# Patient Record
Sex: MALE | Race: WHITE
[De-identification: names, ages, dates, MRNs, and addresses within clinical notes are randomized per-mention and may not be internally consistent; named-entity substitution may affect disease eponyms.]

---

## 2017-03-27 ENCOUNTER — RX ONLY (OUTPATIENT)
Age: 72
Setting detail: RX ONLY
End: 2017-03-27

## 2017-03-27 PROBLEM — D04.39 CARCINOMA IN SITU OF SKIN OF OTHER PARTS OF FACE: Status: ACTIVE | Noted: 2017-03-27

## 2017-03-28 ENCOUNTER — OFFICE VISIT (OUTPATIENT)
Dept: MEDICAL GROUP | Facility: MEDICAL CENTER | Age: 72
End: 2017-03-28
Payer: MEDICARE

## 2017-03-28 VITALS
OXYGEN SATURATION: 92 % | WEIGHT: 272 LBS | BODY MASS INDEX: 38.94 KG/M2 | HEART RATE: 89 BPM | HEIGHT: 70 IN | DIASTOLIC BLOOD PRESSURE: 84 MMHG | SYSTOLIC BLOOD PRESSURE: 140 MMHG | TEMPERATURE: 98.4 F

## 2017-03-28 DIAGNOSIS — Z12.5 PROSTATE CANCER SCREENING: ICD-10-CM

## 2017-03-28 DIAGNOSIS — E78.5 DYSLIPIDEMIA: Chronic | ICD-10-CM

## 2017-03-28 DIAGNOSIS — I10 ESSENTIAL HYPERTENSION: Chronic | ICD-10-CM

## 2017-03-28 DIAGNOSIS — M79.7 FIBROMYALGIA: Chronic | ICD-10-CM

## 2017-03-28 DIAGNOSIS — I48.0 PAROXYSMAL ATRIAL FIBRILLATION (HCC): Chronic | ICD-10-CM

## 2017-03-28 DIAGNOSIS — E55.9 HYPOVITAMINOSIS D: ICD-10-CM

## 2017-03-28 DIAGNOSIS — Z12.11 COLON CANCER SCREENING: ICD-10-CM

## 2017-03-28 DIAGNOSIS — F33.41 RECURRENT MAJOR DEPRESSIVE DISORDER, IN PARTIAL REMISSION (HCC): Chronic | ICD-10-CM

## 2017-03-28 DIAGNOSIS — E66.9 OBESITY, UNSPECIFIED OBESITY SEVERITY, UNSPECIFIED OBESITY TYPE: ICD-10-CM

## 2017-03-28 PROCEDURE — 99214 OFFICE O/P EST MOD 30 MIN: CPT | Performed by: INTERNAL MEDICINE

## 2017-03-28 NOTE — MR AVS SNAPSHOT
"        Julio Gonzalez   3/28/2017 10:00 AM   Office Visit   MRN: [id number]    Department:  [facility]   Dept Phone:  [phone]    Description:  Male : 1945   Provider:  Norman Peterson M.D.           Allergies as of 3/28/2017     Allergen Noted Reactions    Dye Fdc Blue [Brilliant Blue Fcf] 2009       Angiogram contrast sensitivity, became very irritable      You were diagnosed with     Colon cancer screening   [324894]       Paroxysmal atrial fibrillation (CMS-HCC)   [990739]       Essential hypertension   [4176472]       Dyslipidemia   [299651]       Recurrent major depressive disorder, in partial remission (CMS-HCC)   [5903880]       Fibromyalgia   [624747]       Prostate cancer screening   [629880]       Hypovitaminosis D   [323033]         Vital Signs     Blood Pressure Pulse Temperature Height Weight Body Mass Index    140/84 mmHg 89 36.9 °C (98.4 °F) 1.778 m (5' 10\") 123.378 kg (272 lb) 39.03 kg/m2    Oxygen Saturation Smoking Status                92% Former Smoker          Basic Information     Date Of Birth Sex Race Ethnicity Preferred Language    1945 Male White Non- English      Your appointments     2017 11:20 AM   FOLLOW UP with Irineo Skinner M.D.   [facility] (--)    [address], [address]  [place] [id number] [phone]            Sep 28, 2017 10:00 AM   Established Patient with Norman Peterson M.D.   [facility])    [address]  [facility] [address] [phone]           You will be receiving a confirmation call a few days before your appointment from our automated call confirmation system.              Problem List              ICD-10-CM Priority Class Noted - Resolved    S/P hip replacement Z96.649   2009 - Present    Status post lumbar surgery (Chronic) Z98.890   2009 - Present    Cervical pain M54.2   2009 - Present   " Hypertension (Chronic) I10   9/16/2009 - Present    Paroxysmal atrial fibrillation (CMS-HCC) (Chronic) I48.0   9/16/2009 - Present    Dyslipidemia (Chronic) E78.5   9/16/2009 - Present    Skin lesion L98.9   9/16/2009 - Present    Preventative health care (Chronic) Z00.00   9/16/2009 - Present    S/P knee replacement Z96.659   9/16/2009 - Present    Obesity E66.9   9/17/2009 - Present    Plantar fasciitis M72.2   11/2/2010 - Present    History of stroke (Chronic) Z86.73   8/5/2011 - Present    MEDICAL HOME    8/29/2012 - Present    Insomnia (Chronic) G47.00   2/8/2013 - Present    Depression, major, 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   12/6/2013 - Present    Chronic pain G89.29   12/6/2013 - Present    Arthritis of knee, right (Chronic) M19.90   9/3/2014 - Present    Speech and language deficit, late effect of cerebrovascular disease I69.928 Low  5/20/2015 - Present    Fibromyalgia (Chronic) M79.7   3/26/2016 - Present      Health Maintenance        Date Due Completion Dates    IMM DTaP/Tdap/Td Vaccine (1 - Tdap) 8/6/2011 8/5/2011    IMM PNEUMOCOCCAL 65+ (ADULT) LOW/MEDIUM RISK SERIES (2 of 2 - PPSV23) 8/27/2016 8/27/2015, 9/17/2009    COLONOSCOPY 4/6/2017 4/6/2007 (Done), 4/6/2007    Override on 4/6/2007: Done            Current Immunizations     13-VALENT PCV PREVNAR 8/27/2015    Influenza TIV (IM) 9/26/2012  4:50 PM    Influenza Vaccine Adult HD 10/11/2016, 9/30/2014    Pneumococcal polysaccharide vaccine (PPSV-23) 9/17/2009    SHINGLES VACCINE 10/26/2010    TD Vaccine 8/5/2011      Below and/or attached are the medications your provider expects you to take. Review all of your home medications and newly ordered medications with your provider and/or pharmacist. Follow medication instructions as directed by your provider and/or pharmacist. Please keep your medication list with you and share with your provider. Update the information when medications are discontinued, doses are changed, or new medications  (including over-the-counter products) are added; and carry medication information at all times in the event of emergency situations     Allergies:  DYE FDC BLUE - (reactions not documented)               Medications  Valid as of: March 28, 2017 - 10:26 AM    Generic Name Brand Name Tablet Size Instructions for use    AmLODIPine Besylate (Tab) NORVASC 10 MG TAKE ONE TABLET BY MOUTH DAILY        Aspirin (Chew Tab) ASA 81 MG Take 1 Tab by mouth every day.        Cholecalciferol (Tab) cholecalciferol 1000 UNIT Take 6,000 Units by mouth every day.        Desonide (Cream) TRIDESILON 0.05 % Apply 1 Application to affected area(s) 1 time daily as needed (rash).        DiphenhydrAMINE HCl (Tab) BENADRYL 25 MG Take 25 mg by mouth every 6 hours as needed for Sleep.        Docusate Sodium (Cap) COLACE 100 MG Take 100 mg by mouth every day.        DULoxetine HCl (Cap DR Particles) CYMBALTA 60 MG Take 1 Cap by mouth every day.        Ibuprofen (Tab) MOTRIN 400 MG Take 400 mg by mouth every 6 hours as needed.        Losartan Potassium (Tab) COZAAR 100 MG TAKE ONE TABLET BY MOUTH DAILY        Multiple Vitamins-Minerals   Take  by mouth.        Naproxen (Tab) NAPROSYN 500 MG Take 1 Tab by mouth 2 times a day as needed.        Oxycodone-Acetaminophen (Tab) PERCOCET-10  MG Take 1 Tab by mouth every four hours as needed for Moderate Pain. Ok to refill percocet 28-30 days after the percocet written on 5/24/16 is filled        Polyethylene Glycol 3350 (Pack) MIRALAX  Take 17 g by mouth every day.        Potassium Chloride Lise CR (Tab CR) Kdur 20 MEQ Take 2 Tabs by mouth 2 times a day.        Simvastatin (Tab) ZOCOR 20 MG TAKE ONE TABLET BY MOUTH EVERY EVENING        Triamterene-HCTZ (Cap) MAXZIDE-25/DYAZIDE 37.5-25 MG TAKE ONE CAPSULE BY MOUTH DAILY        Zolpidem Tartrate (Tab) AMBIEN 10 MG Take 1 Tab by mouth at bedtime as needed for Sleep.        .                 Medicines prescribed today were sent to:     [id number] -  [place], [place] - [address] [facility] [place]    [address] [facility] [place] [address]    Phone: [phone] Fax: [phone]    Open 24 Hours?: No    [facility] - [place], [place] - [address]    [address] [place] [address]    Phone: [phone] Fax: [phone]    Open 24 Hours?: No      Medication refill instructions:       If your prescription bottle indicates you have medication refills left, it is not necessary to call your provider’s office. Please contact your pharmacy and they will refill your medication.    If your prescription bottle indicates you do not have any refills left, you may request refills at any time through one of the following ways: The online [facility] system (except Urgent Care), by calling your provider’s office, or by asking your pharmacy to contact your provider’s office with a refill request. Medication refills are processed only during regular business hours and may not be available until the next business day. Your provider may request additional information or to have a follow-up visit with you prior to refilling your medication.   *Please Note: Medication refills are assigned a new Rx number when refilled electronically. Your pharmacy may indicate that no refills were authorized even though a new prescription for the same medication is available at the pharmacy. Please request the medicine by name with the pharmacy before contacting your provider for a refill.        Your To Do List     Future Labs/Procedures Complete By Expires    CBC WITH DIFFERENTIAL  As directed 3/29/2018    CBC WITH DIFFERENTIAL  As directed 3/29/2018    COMP METABOLIC PANEL  As directed 3/29/2018    COMP METABOLIC PANEL  As directed 3/29/2018    LIPID PROFILE  As directed 3/29/2018    LIPID PROFILE  As directed 3/29/2018    PROSTATE SPECIFIC AG SCREENING  As directed 3/29/2018    PROSTATE SPECIFIC AG SCREENING  As directed 3/29/2018    TSH  As directed 3/29/2018    TSH  As directed 3/29/2018     VITAMIN D,25 HYDROXY  As directed 3/29/2018    VITAMIN D,25 HYDROXY  As directed 3/29/2018      Referral     A referral request has been sent to our patient care coordination department. Please allow 3-5 business days for us to process this request and contact you either by phone or mail. If you do not hear from us by the 5th business day, please call us at [phone].        Other Notes About Your Plan     Need all labs                     MyChart Access Code: Activation code not generated  Current MyChart Status: Active

## 2017-03-28 NOTE — PROGRESS NOTES
Subjective:      Julio Gonzalez is a 71 y.o. male who presents with htn        HPI     Hypertension on losartan and maxzide blood pressure runs 130-140/80 has wrist cuff at home, not exercising regularly, no soda, sees cardiology next week, no palpitations or chest pain, no extra caffeine, on oxycone per  no change in meds, on cymbalta stable mood, on ambien for sleep no side effects, tries to eat healthy wheat cereal breakfast and salad and sandwich for lunch and dinner salad, tries to avoid sweets and candies, no etoh  Remains stable on hydrocodone for pain management 10 mg 5-6 per day, no drowsiness, sedation, memory loss, confusion, worsening depression, mood changes, good social support with wife, no alcohol, no illicit drugs  Due for colon [facility] last 10 years ago  Has had annual eye exam and uses reading glasses  Teeth cleaning twice per year  Remains on losartan, Maxzide, Norvasc no lightheadedness or dizziness  Dyslipidemia on Zocor no muscle pain or muscle aches  Fibromyalgia and taking Cymbalta no mood changes, anxiety, depression      Current Outpatient Prescriptions   Medication Sig Dispense Refill   • triamterene/hctz (MAXZIDE-25/DYAZIDE) 37.5-25 MG Cap TAKE ONE CAPSULE BY MOUTH DAILY 90 Cap 0   • losartan (COZAAR) 100 MG Tab TAKE ONE TABLET BY MOUTH DAILY 90 Tab 0   • amlodipine (NORVASC) 10 MG Tab TAKE ONE TABLET BY MOUTH DAILY 90 Tab 0   • simvastatin (ZOCOR) 20 MG Tab TAKE ONE TABLET BY MOUTH EVERY EVENING 90 Tab 0   • potassium chloride SA (KLOR-CON M20) 20 MEQ Tab CR Take 2 Tabs by mouth 2 times a day. 360 Tab 3   • zolpidem (AMBIEN) 10 MG Tab Take 1 Tab by mouth at bedtime as needed for Sleep. 90 Each 1   • duloxetine (CYMBALTA) 60 MG Cap DR Particles delayed-release capsule Take 1 Cap by mouth every day. 90 Cap 3   • Multiple Vitamins-Minerals (MULTI COMPLETE PO) Take  by mouth.     • diphenhydrAMINE (BENADRYL) 25 MG Tab Take 25 mg by mouth every 6 hours as needed for Sleep.     •  "ibuprofen (MOTRIN) 400 MG Tab Take 400 mg by mouth every 6 hours as needed.     • Oxycodone-Acetaminophen (PERCOCET-10)  MG Tab Take 1 Tab by mouth every four hours as needed for Moderate Pain. Ok to refill percocet 28-30 days after the percocet written on 5/24/16 is filled 180 Tab 0   • desonide (TRIDESILON) 0.05 % Cream Apply 1 Application to affected area(s) 1 time daily as needed (rash). 45 g 3   • naproxen (NAPROSYN) 500 MG Tab Take 1 Tab by mouth 2 times a day as needed. 60 Tab 5   • aspirin (ASA) 81 MG Chew Tab chewable tablet Take 1 Tab by mouth every day. 100 Tab 11   • polyethylene glycol/lytes (MIRALAX) PACK Take 17 g by mouth every day.     • docusate sodium (COLACE) 100 MG CAPS Take 100 mg by mouth every day.     • vitamin D (CHOLECALCIFEROL) 1000 UNIT TABS Take 6,000 Units by mouth every day.       No current facility-administered medications for this visit.     Patient Active Problem List    Diagnosis Date Noted   • Speech and language deficit, late effect of cerebrovascular disease 05/20/2015     Priority: Low   • Fibromyalgia 03/26/2016   • Arthritis of knee, right 09/03/2014   • Depression, major, in partial remission (HCC) 12/06/2013   • Chronic pain 12/06/2013   • Insomnia 02/08/2013   • MEDICAL HOME 08/29/2012   • History of stroke 08/05/2011   • Plantar fasciitis 11/02/2010   • Obesity 09/17/2009   • S/P hip replacement 09/16/2009   • Status post lumbar surgery 09/16/2009   • Cervical pain 09/16/2009   • Hypertension 09/16/2009   • Paroxysmal atrial fibrillation (CMS-HCC) 09/16/2009   • Dyslipidemia 09/16/2009   • Skin lesion 09/16/2009   • Preventative health care 09/16/2009   • S/P knee replacement 09/16/2009           ROS       Objective:     /84 mmHg  Pulse 89  Temp(Src) 36.9 °C (98.4 °F)  Ht 1.778 m (5' 10\")  Wt 123.378 kg (272 lb)  BMI 39.03 kg/m2  SpO2 92%     Physical Exam   Constitutional: He appears well-developed and well-nourished. No distress.   HENT:   Head: " Normocephalic and atraumatic.   Eyes: Conjunctivae are normal. Right eye exhibits no discharge. Left eye exhibits no discharge.   Neck: No JVD present. No thyromegaly present.   Cardiovascular: Normal rate, regular rhythm and normal heart sounds.    Pulmonary/Chest: Effort normal and breath sounds normal. No respiratory distress. He has no wheezes.   Abdominal: Soft. He exhibits no distension.   Musculoskeletal: He exhibits no edema.   Neurological: He is alert.   Skin: He is not diaphoretic. No erythema.   Psychiatric: He has a normal mood and affect. His behavior is normal.   Nursing note and vitals reviewed.              Assessment/Plan:     Assessment  #1 hypertension currently stable on Maxide, losartan, Norvasc no lightheadedness or dizziness    #2 paroxysmal atrial fibrillation on aspirin per cardiology status post Maze procedure, no chest pain, no palpitations, no lightheadedness    #3 chronic pain on Percocet per Dr. Mathias from pain management, dose has maintained the same, no side effects such as drowsiness, sedation, memory loss, falls, depression, constipation, no alcohol or illicit drugs    #4 obesity BMI 39    #5 insomnia on Ambien without daytime drowsiness or hangover effect    #6 dyslipidemia on Zocor no muscle pain or muscle aches on statin therapy    #7 fibromyalgia on Cymbalta    #8 history of low vitamin D       Plan  #! [facility] referral follow-up colonoscopy colon cancer screening    #2 labs    #3 continue check blood pressure regularly and record    #4 start exercising 30 minutes daily, importance of diet, exercise as tolerated given chronic pain, discussed importance of weight loss with regards to cardiovascular health, diabetes prevention discussed, declines nutrition consultation    #5 continue follow-up pain management    #6 sleep hygiene discussed    #7 follow-up 6 months    #8 continue no tobacco, no alcohol    #9 opiate risk zero

## 2017-04-04 ENCOUNTER — OFFICE VISIT (OUTPATIENT)
Dept: CARDIOLOGY | Facility: MEDICAL CENTER | Age: 72
End: 2017-04-04
Payer: MEDICARE

## 2017-04-04 VITALS
HEIGHT: 70 IN | BODY MASS INDEX: 39.65 KG/M2 | SYSTOLIC BLOOD PRESSURE: 154 MMHG | WEIGHT: 277 LBS | DIASTOLIC BLOOD PRESSURE: 90 MMHG | OXYGEN SATURATION: 92 % | HEART RATE: 60 BPM

## 2017-04-04 DIAGNOSIS — I10 ESSENTIAL HYPERTENSION: ICD-10-CM

## 2017-04-04 DIAGNOSIS — I48.0 PAF (PAROXYSMAL ATRIAL FIBRILLATION) (HCC): ICD-10-CM

## 2017-04-04 DIAGNOSIS — M17.11 ARTHRITIS OF KNEE, RIGHT: Chronic | ICD-10-CM

## 2017-04-04 DIAGNOSIS — E66.01 MORBID OBESITY DUE TO EXCESS CALORIES (HCC): ICD-10-CM

## 2017-04-04 DIAGNOSIS — I69.928 SPEECH AND LANGUAGE DEFICIT, LATE EFFECT OF CEREBROVASCULAR DISEASE: ICD-10-CM

## 2017-04-04 DIAGNOSIS — I48.0 PAROXYSMAL ATRIAL FIBRILLATION (HCC): Chronic | ICD-10-CM

## 2017-04-04 LAB — EKG IMPRESSION: NORMAL

## 2017-04-04 PROCEDURE — 1101F PT FALLS ASSESS-DOCD LE1/YR: CPT | Performed by: INTERNAL MEDICINE

## 2017-04-04 PROCEDURE — 99214 OFFICE O/P EST MOD 30 MIN: CPT | Performed by: INTERNAL MEDICINE

## 2017-04-04 PROCEDURE — G8432 DEP SCR NOT DOC, RNG: HCPCS | Performed by: INTERNAL MEDICINE

## 2017-04-04 PROCEDURE — 4040F PNEUMOC VAC/ADMIN/RCVD: CPT | Performed by: INTERNAL MEDICINE

## 2017-04-04 PROCEDURE — 93000 ELECTROCARDIOGRAM COMPLETE: CPT | Performed by: INTERNAL MEDICINE

## 2017-04-04 PROCEDURE — G8419 CALC BMI OUT NRM PARAM NOF/U: HCPCS | Performed by: INTERNAL MEDICINE

## 2017-04-04 PROCEDURE — 1036F TOBACCO NON-USER: CPT | Performed by: INTERNAL MEDICINE

## 2017-04-04 NOTE — PROGRESS NOTES
Subjective:   Julio Gonzalez is a 71 y.o. male who presents today with previous a fib post Maze. CVA in past. Feeling well. Weight still a problem not interested in bariatric referral. Htn moderate control.  No chest pain or SOB. No syncope or presyncope.  Past Medical History   Diagnosis Date   • CAD (coronary artery disease)    • Arthritis    • Pain      right leg & foot, left foot, lower back   • Hypertension    • Hypercholesteremia    • Aphasia    • Hypercholesterolemia 2/13/2012   • MEDICAL HOME    • Arrhythmia      a-fib, ablation x 2   • Stroke (CMS-HCC) 1999     aphasia , right sided weakness   • Unspecified cerebral artery occlusion without mention of cerebral infarction 2/13/2012     Past Surgical History   Procedure Laterality Date   • Hip replacement, total  2002/2004     right/left   • Other cardiac surgery  2000     surgical maze   • Other abdominal surgery  1981     stomach stapling   • Knee replacement, total  2001     left   • Vein ligation  1987     left   • Other  1994     bone spur removed right heel   • Other  1999     cardiac ablation x 2   • Block epidural steroid injection  2001     x 2 back   • Colonoscopy  2001/2007   • Angiogram  1999     cardiac   • Eswt  12/9/2009     Performed by DEEPAK ALLEN at [facility] ORS   • Pr inj dx/ther agnt paravert facet joint, jessica*  11/4/2011     Performed by HAMLET GASCA at [facility] ORS   • Pr inj dx/ther agnt paravert facet joint, ce*  11/4/2011     Performed by HAMLET GASCA at [facility] ORS   • Maze procedure       2000   • Lumbar fusion posterior  9/26/2012     Performed by Chris Beltran M.D. at [facility] ORS   • Lumbar decompression  9/26/2012     Performed by Chris Beltran M.D. at SUR[facility]ERY [facility] ORS   • Maze procedure     • Knee replacement, total  2002     Family History   Problem Relation Age of Onset   • Heart Disease     • Hypertension     • Stroke     • Heart Disease  Brother      atrial fib   • Cancer Sister      breast cancer     History   Smoking status   • Former Smoker   • Quit date: 09/07/1967   Smokeless tobacco   • Never Used     Allergies   Allergen Reactions   • Dye Fdc Blue [Brilliant Blue Fcf]      Angiogram contrast sensitivity, became very irritable     Outpatient Encounter Prescriptions as of 4/4/2017   Medication Sig Dispense Refill   • triamterene/hctz (MAXZIDE-25/DYAZIDE) 37.5-25 MG Cap TAKE ONE CAPSULE BY MOUTH DAILY 90 Cap 0   • losartan (COZAAR) 100 MG Tab TAKE ONE TABLET BY MOUTH DAILY 90 Tab 0   • amlodipine (NORVASC) 10 MG Tab TAKE ONE TABLET BY MOUTH DAILY 90 Tab 0   • simvastatin (ZOCOR) 20 MG Tab TAKE ONE TABLET BY MOUTH EVERY EVENING 90 Tab 0   • potassium chloride SA (KLOR-CON M20) 20 MEQ Tab CR Take 2 Tabs by mouth 2 times a day. 360 Tab 3   • zolpidem (AMBIEN) 10 MG Tab Take 1 Tab by mouth at bedtime as needed for Sleep. 90 Each 1   • duloxetine (CYMBALTA) 60 MG Cap DR Particles delayed-release capsule Take 1 Cap by mouth every day. 90 Cap 3   • Multiple Vitamins-Minerals (MULTI COMPLETE PO) Take  by mouth.     • diphenhydrAMINE (BENADRYL) 25 MG Tab Take 25 mg by mouth every 6 hours as needed for Sleep.     • Oxycodone-Acetaminophen (PERCOCET-10)  MG Tab Take 1 Tab by mouth every four hours as needed for Moderate Pain. Ok to refill percocet 28-30 days after the percocet written on 5/24/16 is filled 180 Tab 0   • desonide (TRIDESILON) 0.05 % Cream Apply 1 Application to affected area(s) 1 time daily as needed (rash). 45 g 3   • naproxen (NAPROSYN) 500 MG Tab Take 1 Tab by mouth 2 times a day as needed. 60 Tab 5   • aspirin (ASA) 81 MG Chew Tab chewable tablet Take 1 Tab by mouth every day. 100 Tab 11   • polyethylene glycol/lytes (MIRALAX) PACK Take 17 g by mouth every day.     • docusate sodium (COLACE) 100 MG CAPS Take 100 mg by mouth every day.     • vitamin D (CHOLECALCIFEROL) 1000 UNIT TABS Take 6,000 Units by mouth every day.     •  "ibuprofen (MOTRIN) 400 MG Tab Take 400 mg by mouth every 6 hours as needed.       No facility-administered encounter medications on file as of 4/4/2017.     ROS     Objective:   /90 mmHg  Pulse 60  Ht 1.778 m (5' 10\")  Wt 125.646 kg (277 lb)  BMI 39.75 kg/m2  SpO2 92%    Physical Exam   Constitutional: He is oriented to person, place, and time. He appears well-developed and well-nourished.   HENT:   Mouth/Throat: Oropharynx is clear and moist.   Eyes: Conjunctivae and EOM are normal.   Neck: No JVD present. No thyroid mass present.   Cardiovascular: Normal rate, regular rhythm, S1 normal, S2 normal and normal pulses.  PMI is not displaced.  Exam reveals no gallop.    No murmur heard.  Pulses:       Carotid pulses are 2+ on the right side, and 2+ on the left side.       Radial pulses are 2+ on the right side, and 2+ on the left side.        Femoral pulses are 2+ on the right side, and 2+ on the left side.       Dorsalis pedis pulses are 2+ on the right side, and 2+ on the left side.   No peripheral edema.   Pulmonary/Chest: Effort normal and breath sounds normal.   Abdominal: Soft. Normal appearance. He exhibits no abdominal bruit. There is no hepatosplenomegaly. There is no tenderness.   Musculoskeletal: Normal range of motion. He exhibits no edema.        Thoracic back: He exhibits no tenderness and no spasm.   Neurological: He is alert and oriented to person, place, and time.   Skin: No rash noted. No cyanosis. Nails show no clubbing.   Psychiatric: He has a normal mood and affect.       Assessment:     1. PAF (paroxysmal atrial fibrillation) (CMS-[facility])  EKG   2. Essential hypertension  EKG   3. Paroxysmal atrial fibrillation (CMS-HCC)     4. Speech and language deficit, late effect of cerebrovascular disease     5. Morbid obesity due to excess calories (CMS-[facility])     6. Arthritis of knee, right         Medical Decision Making:  Today's Assessment / Status / Plan:     1. A fib no obvious recurrence.  2. " Htn continue meds.  3. HLD on statins.  4. Obesity work on weight loss.  5. F/U in 1 year.

## 2017-04-04 NOTE — MR AVS SNAPSHOT
"        Julio Gonzalez   2017 11:20 AM   Office Visit   MRN: [id number]    Department:  [facility]   Dept Phone:  [phone]    Description:  Male : 1945   Provider:  Irineo Skinner M.D.           Reason for Visit     Follow-Up           Allergies as of 2017     Allergen Noted Reactions    Dye Fdc Blue [Brilliant Blue Fcf] 2009       Angiogram contrast sensitivity, became very irritable      You were diagnosed with     PAF (paroxysmal atrial fibrillation) (CMS-HCC)   [063863]       Essential hypertension   [2295527]         Vital Signs     Blood Pressure Pulse Height Weight Body Mass Index Oxygen Saturation    154/90 mmHg 60 1.778 m (5' 10\") 125.646 kg (277 lb) 39.75 kg/m2 92%    Smoking Status                   Former Smoker           Basic Information     Date Of Birth Sex Race Ethnicity Preferred Language    1945 Male White Non- English      Your appointments     Sep 28, 2017 10:00 AM   Established Patient with Norman Peterson M.D.   [facility])    [address]  [facility] [address] [phone]           You will be receiving a confirmation call a few days before your appointment from our automated call confirmation system.              Problem List              ICD-10-CM Priority Class Noted - Resolved    S/P hip replacement Z96.649   2009 - Present    Status post lumbar surgery (Chronic) Z98.890   2009 - Present    Cervical pain M54.2   2009 - Present    Hypertension (Chronic) I10   2009 - Present    Paroxysmal atrial fibrillation (CMS-HCC) (Chronic) I48.0   2009 - Present    Dyslipidemia (Chronic) E78.5   2009 - Present    Skin lesion L98.9   2009 - Present    Preventative health care (Chronic) Z00.00   2009 - Present    S/P knee replacement Z96.659   2009 - Present    Obesity E66.9   2009 - Present    Plantar fasciitis M72.2   2010 - Present    History " of stroke (Chronic) Z86.73   8/5/2011 - Present    MEDICAL HOME    8/29/2012 - Present    Insomnia (Chronic) G47.00   2/8/2013 - Present    Depression, major, 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   12/6/2013 - Present    Chronic pain G89.29   12/6/2013 - Present    Arthritis of knee, right (Chronic) M19.90   9/3/2014 - Present    Speech and language deficit, late effect of cerebrovascular disease I69.928 Low  5/20/2015 - Present    Fibromyalgia (Chronic) M79.7   3/26/2016 - Present      Health Maintenance        Date Due Completion Dates    IMM DTaP/Tdap/Td Vaccine (1 - Tdap) 8/6/2011 8/5/2011    IMM PNEUMOCOCCAL 65+ (ADULT) LOW/MEDIUM RISK SERIES (2 of 2 - PPSV23) 8/27/2016 8/27/2015, 9/17/2009    COLONOSCOPY 4/6/2017 4/6/2007 (Done), 4/6/2007    Override on 4/6/2007: Done            Results       Current Immunizations     13-VALENT PCV PREVNAR 8/27/2015    Influenza TIV (IM) 9/26/2012  4:50 PM    Influenza Vaccine Adult HD 10/11/2016, 9/30/2014    Pneumococcal polysaccharide vaccine (PPSV-23) 9/17/2009    SHINGLES VACCINE 10/26/2010    TD Vaccine 8/5/2011      Below and/or attached are the medications your provider expects you to take. Review all of your home medications and newly ordered medications with your provider and/or pharmacist. Follow medication instructions as directed by your provider and/or pharmacist. Please keep your medication list with you and share with your provider. Update the information when medications are discontinued, doses are changed, or new medications (including over-the-counter products) are added; and carry medication information at all times in the event of emergency situations     Allergies:  DYE FDC BLUE - (reactions not documented)               Medications  Valid as of: April 04, 2017 - 11:41 AM    Generic Name Brand Name Tablet Size Instructions for use    AmLODIPine Besylate (Tab) NORVASC 10 MG TAKE ONE TABLET BY MOUTH DAILY        Aspirin (Chew Tab) ASA 81 MG Take 1 Tab  by mouth every day.        Cholecalciferol (Tab) cholecalciferol 1000 UNIT Take 6,000 Units by mouth every day.        Desonide (Cream) TRIDESILON 0.05 % Apply 1 Application to affected area(s) 1 time daily as needed (rash).        DiphenhydrAMINE HCl (Tab) BENADRYL 25 MG Take 25 mg by mouth every 6 hours as needed for Sleep.        Docusate Sodium (Cap) COLACE 100 MG Take 100 mg by mouth every day.        DULoxetine HCl (Cap DR Particles) CYMBALTA 60 MG Take 1 Cap by mouth every day.        Ibuprofen (Tab) MOTRIN 400 MG Take 400 mg by mouth every 6 hours as needed.        Losartan Potassium (Tab) COZAAR 100 MG TAKE ONE TABLET BY MOUTH DAILY        Multiple Vitamins-Minerals   Take  by mouth.        Naproxen (Tab) NAPROSYN 500 MG Take 1 Tab by mouth 2 times a day as needed.        Oxycodone-Acetaminophen (Tab) PERCOCET-10  MG Take 1 Tab by mouth every four hours as needed for Moderate Pain. Ok to refill percocet 28-30 days after the percocet written on 5/24/16 is filled        Polyethylene Glycol 3350 (Pack) MIRALAX  Take 17 g by mouth every day.        Potassium Chloride Lise CR (Tab CR) Kdur 20 MEQ Take 2 Tabs by mouth 2 times a day.        Simvastatin (Tab) ZOCOR 20 MG TAKE ONE TABLET BY MOUTH EVERY EVENING        Triamterene-HCTZ (Cap) MAXZIDE-25/DYAZIDE 37.5-25 MG TAKE ONE CAPSULE BY MOUTH DAILY        Zolpidem Tartrate (Tab) AMBIEN 10 MG Take 1 Tab by mouth at bedtime as needed for Sleep.        .                 Medicines prescribed today were sent to:     [id number] [place] - [address] [facility]    [address] [address] [facility] [place] [address]    Phone: [phone] Fax: [phone]    Open 24 Hours?: No    [facility] - [place], [place] - [address]    [address] [place] [address]    Phone: [phone] Fax: [phone]    Open 24 Hours?: No      Medication refill instructions:       If your prescription bottle indicates you have medication refills left, it is not necessary to call  your provider’s office. Please contact your pharmacy and they will refill your medication.    If your prescription bottle indicates you do not have any refills left, you may request refills at any time through one of the following ways: The online [employer] system (except Urgent Care), by calling your provider’s office, or by asking your pharmacy to contact your provider’s office with a refill request. Medication refills are processed only during regular business hours and may not be available until the next business day. Your provider may request additional information or to have a follow-up visit with you prior to refilling your medication.   *Please Note: Medication refills are assigned a new Rx number when refilled electronically. Your pharmacy may indicate that no refills were authorized even though a new prescription for the same medication is available at the pharmacy. Please request the medicine by name with the pharmacy before contacting your provider for a refill.        Other Notes About Your Plan     Need all labs                     [employer] Access Code: Activation code not generated  Current [employer] Status: Active

## 2017-04-04 NOTE — Clinical Note
[facility]   [address]  [place] [address]  Phone: [phone]  Fax: [phone]              Julio Gonzalez  1945    Encounter Date: 4/4/2017    Irineo Skinner M.D.          PROGRESS NOTE:  Subjective:   Julio Gonzalez is a 71 y.o. male who presents today with previous a fib post Maze. CVA in past. Feeling well. Weight still a problem not interested in bariatric referral. Htn moderate control.  No chest pain or SOB. No syncope or presyncope.  Past Medical History   Diagnosis Date   • CAD (coronary artery disease)    • Arthritis    • Pain      right leg & foot, left foot, lower back   • Hypertension    • Hypercholesteremia    • Aphasia    • Hypercholesterolemia 2/13/2012   • MEDICAL HOME    • Arrhythmia      a-fib, ablation x 2   • Stroke (CMS-[facility]) 1999     aphasia , right sided weakness   • Unspecified cerebral artery occlusion without mention of cerebral infarction 2/13/2012     Past Surgical History   Procedure Laterality Date   • Hip replacement, total  2002/2004     right/left   • Other cardiac surgery  2000     surgical maze   • Other abdominal surgery  1981     stomach stapling   • Knee replacement, total  2001     left   • Vein ligation  1987     left   • Other  1994     bone spur removed right heel   • Other  1999     cardiac ablation x 2   • Block epidural steroid injection  2001     x 2 back   • Colonoscopy  2001/2007   • Angiogram  1999     cardiac   • Eswt  12/9/2009     Performed by DEEPAK ALLEN at SURGERY [facility] ORS   • Pr inj dx/ther agnt paravert facet joint, jessica*  11/4/2011     Performed by HAMLET GASCA at SURGERY [facility] ORS   • Pr inj dx/ther agnt paravert facet joint, ce*  11/4/2011     Performed by HAMLET GASCA at [facility] ORS   • Maze procedure       2000   • Lumbar fusion posterior  9/26/2012     Performed by Chris Beltran M.D. at SURGERY [facility] ORS   • Lumbar  decompression  9/26/2012     Performed by Chris Beltran M.D. at SURGERY [facility] ORS   • Maze procedure     • Knee replacement, total  2002     Family History   Problem Relation Age of Onset   • Heart Disease     • Hypertension     • Stroke     • Heart Disease Brother      atrial fib   • Cancer Sister      breast cancer     History   Smoking status   • Former Smoker   • Quit date: 09/07/1967   Smokeless tobacco   • Never Used     Allergies   Allergen Reactions   • Dye Fdc Blue [Brilliant Blue Fcf]      Angiogram contrast sensitivity, became very irritable     Outpatient Encounter Prescriptions as of 4/4/2017   Medication Sig Dispense Refill   • triamterene/hctz (MAXZIDE-25/DYAZIDE) 37.5-25 MG Cap TAKE ONE CAPSULE BY MOUTH DAILY 90 Cap 0   • losartan (COZAAR) 100 MG Tab TAKE ONE TABLET BY MOUTH DAILY 90 Tab 0   • amlodipine (NORVASC) 10 MG Tab TAKE ONE TABLET BY MOUTH DAILY 90 Tab 0   • simvastatin (ZOCOR) 20 MG Tab TAKE ONE TABLET BY MOUTH EVERY EVENING 90 Tab 0   • potassium chloride SA (KLOR-CON M20) 20 MEQ Tab CR Take 2 Tabs by mouth 2 times a day. 360 Tab 3   • zolpidem (AMBIEN) 10 MG Tab Take 1 Tab by mouth at bedtime as needed for Sleep. 90 Each 1   • duloxetine (CYMBALTA) 60 MG Cap DR Particles delayed-release capsule Take 1 Cap by mouth every day. 90 Cap 3   • Multiple Vitamins-Minerals (MULTI COMPLETE PO) Take  by mouth.     • diphenhydrAMINE (BENADRYL) 25 MG Tab Take 25 mg by mouth every 6 hours as needed for Sleep.     • Oxycodone-Acetaminophen (PERCOCET-10)  MG Tab Take 1 Tab by mouth every four hours as needed for Moderate Pain. Ok to refill percocet 28-30 days after the percocet written on 5/24/16 is filled 180 Tab 0   • desonide (TRIDESILON) 0.05 % Cream Apply 1 Application to affected area(s) 1 time daily as needed (rash). 45 g 3   • naproxen (NAPROSYN) 500 MG Tab Take 1 Tab by mouth 2 times a day as needed. 60 Tab 5   • aspirin (ASA) 81 MG Chew Tab chewable tablet Take 1 Tab by mouth every  "day. 100 Tab 11   • polyethylene glycol/lytes (MIRALAX) PACK Take 17 g by mouth every day.     • docusate sodium (COLACE) 100 MG CAPS Take 100 mg by mouth every day.     • vitamin D (CHOLECALCIFEROL) 1000 UNIT TABS Take 6,000 Units by mouth every day.     • ibuprofen (MOTRIN) 400 MG Tab Take 400 mg by mouth every 6 hours as needed.       No facility-administered encounter medications on file as of 4/4/2017.     ROS     Objective:   /90 mmHg  Pulse 60  Ht 1.778 m (5' 10\")  Wt 125.646 kg (277 lb)  BMI 39.75 kg/m2  SpO2 92%    Physical Exam   Constitutional: He is oriented to person, place, and time. He appears well-developed and well-nourished.   HENT:   Mouth/Throat: Oropharynx is clear and moist.   Eyes: Conjunctivae and EOM are normal.   Neck: No JVD present. No thyroid mass present.   Cardiovascular: Normal rate, regular rhythm, S1 normal, S2 normal and normal pulses.  PMI is not displaced.  Exam reveals no gallop.    No murmur heard.  Pulses:       Carotid pulses are 2+ on the right side, and 2+ on the left side.       Radial pulses are 2+ on the right side, and 2+ on the left side.        Femoral pulses are 2+ on the right side, and 2+ on the left side.       Dorsalis pedis pulses are 2+ on the right side, and 2+ on the left side.   No peripheral edema.   Pulmonary/Chest: Effort normal and breath sounds normal.   Abdominal: Soft. Normal appearance. He exhibits no abdominal bruit. There is no hepatosplenomegaly. There is no tenderness.   Musculoskeletal: Normal range of motion. He exhibits no edema.        Thoracic back: He exhibits no tenderness and no spasm.   Neurological: He is alert and oriented to person, place, and time.   Skin: No rash noted. No cyanosis. Nails show no clubbing.   Psychiatric: He has a normal mood and affect.       Assessment:     1. PAF (paroxysmal atrial fibrillation) (CMS-HCC)  EKG   2. Essential hypertension  EKG   3. Paroxysmal atrial fibrillation (CMS-HCC)     4. Speech " and language deficit, late effect of cerebrovascular disease     5. Morbid obesity due to excess calories (CMS-HCC)     6. Arthritis of knee, right         Medical Decision Making:  Today's Assessment / Status / Plan:     1. A fib no obvious recurrence.  2. Htn continue meds.  3. HLD on statins.  4. Obesity work on weight loss.  5. F/U in 1 year.      Norman Peterson M.D.  [address]  [place] [address]  VIA In Basket

## 2017-05-01 PROBLEM — C44.329 SQUAMOUS CELL CARCINOMA OF SKIN OF OTHER PARTS OF FACE: Status: ACTIVE | Noted: 2017-05-01

## 2017-07-03 ENCOUNTER — PATIENT OUTREACH (OUTPATIENT)
Dept: HEALTH INFORMATION MANAGEMENT | Facility: OTHER | Age: 72
End: 2017-07-03

## 2017-07-03 NOTE — PROGRESS NOTES
7/3/17  -     WebIZ Checked & Epic Updated: yes  [facility] Verified: yes  Verify PCP: yes    Communication Preference Obtained: yes     Review Care Team: yes    Annual Wellness Visit Scheduling  1. Scheduling Status:Scheduled        Care Gap Scheduling (Attempt to Schedule EACH Overdue Care Gap!)     Health Maintenance Due   Topic Date Due   • IMM DTaP/Tdap/Td Vaccine (1 - Tdap) Scheduled   • IMM PNEUMOCOCCAL 65+ (ADULT) LOW/MEDIUM RISK SERIES (2 of 2 - PPSV23) Scheduled   • COLONOSCOPY  Pt has a referral, but wife said that he is going to talk with PCP before to schedule an appt.         [employer] Activation: already active  [employer] Haseeb: yes  Virtual Visits: yes  Opt In to Text Messages: yes

## 2017-07-11 ENCOUNTER — HOSPITAL ENCOUNTER (OUTPATIENT)
Dept: LAB | Facility: MEDICAL CENTER | Age: 72
End: 2017-07-11
Attending: INTERNAL MEDICINE
Payer: MEDICARE

## 2017-07-11 ENCOUNTER — TELEPHONE (OUTPATIENT)
Dept: MEDICAL GROUP | Facility: MEDICAL CENTER | Age: 72
End: 2017-07-11

## 2017-07-11 DIAGNOSIS — E55.9 HYPOVITAMINOSIS D: ICD-10-CM

## 2017-07-11 DIAGNOSIS — E78.5 DYSLIPIDEMIA: Chronic | ICD-10-CM

## 2017-07-11 DIAGNOSIS — Z12.5 PROSTATE CANCER SCREENING: ICD-10-CM

## 2017-07-11 LAB
25(OH)D3 SERPL-MCNC: 52 NG/ML (ref 30–100)
ALBUMIN SERPL BCP-MCNC: 4.5 G/DL (ref 3.2–4.9)
ALBUMIN/GLOB SERPL: 1.5 G/DL
ALP SERPL-CCNC: 61 U/L (ref 30–99)
ALT SERPL-CCNC: 15 U/L (ref 2–50)
ANION GAP SERPL CALC-SCNC: 8 MMOL/L (ref 0–11.9)
AST SERPL-CCNC: 18 U/L (ref 12–45)
BASOPHILS # BLD AUTO: 0.6 % (ref 0–1.8)
BASOPHILS # BLD: 0.06 K/UL (ref 0–0.12)
BILIRUB SERPL-MCNC: 0.8 MG/DL (ref 0.1–1.5)
BUN SERPL-MCNC: 17 MG/DL (ref 8–22)
CALCIUM SERPL-MCNC: 10 MG/DL (ref 8.5–10.5)
CHLORIDE SERPL-SCNC: 101 MMOL/L (ref 96–112)
CHOLEST SERPL-MCNC: 147 MG/DL (ref 100–199)
CO2 SERPL-SCNC: 26 MMOL/L (ref 20–33)
CREAT SERPL-MCNC: 0.7 MG/DL (ref 0.5–1.4)
EOSINOPHIL # BLD AUTO: 0.13 K/UL (ref 0–0.51)
EOSINOPHIL NFR BLD: 1.4 % (ref 0–6.9)
ERYTHROCYTE [DISTWIDTH] IN BLOOD BY AUTOMATED COUNT: 41.1 FL (ref 35.9–50)
GFR SERPL CREATININE-BSD FRML MDRD: >60 ML/MIN/1.73 M 2
GLOBULIN SER CALC-MCNC: 3 G/DL (ref 1.9–3.5)
GLUCOSE SERPL-MCNC: 94 MG/DL (ref 65–99)
HCT VFR BLD AUTO: 46.8 % (ref 42–52)
HDLC SERPL-MCNC: 51 MG/DL
HGB BLD-MCNC: 16.3 G/DL (ref 14–18)
IMM GRANULOCYTES # BLD AUTO: 0.02 K/UL (ref 0–0.11)
IMM GRANULOCYTES NFR BLD AUTO: 0.2 % (ref 0–0.9)
LDLC SERPL CALC-MCNC: 67 MG/DL
LYMPHOCYTES # BLD AUTO: 2.59 K/UL (ref 1–4.8)
LYMPHOCYTES NFR BLD: 27.6 % (ref 22–41)
MCH RBC QN AUTO: 32.1 PG (ref 27–33)
MCHC RBC AUTO-ENTMCNC: 34.8 G/DL (ref 33.7–35.3)
MCV RBC AUTO: 92.3 FL (ref 81.4–97.8)
MONOCYTES # BLD AUTO: 0.72 K/UL (ref 0–0.85)
MONOCYTES NFR BLD AUTO: 7.7 % (ref 0–13.4)
NEUTROPHILS # BLD AUTO: 5.85 K/UL (ref 1.82–7.42)
NEUTROPHILS NFR BLD: 62.5 % (ref 44–72)
NRBC # BLD AUTO: 0 K/UL
NRBC BLD AUTO-RTO: 0 /100 WBC
PLATELET # BLD AUTO: 221 K/UL (ref 164–446)
PMV BLD AUTO: 9.1 FL (ref 9–12.9)
POTASSIUM SERPL-SCNC: 3.3 MMOL/L (ref 3.6–5.5)
PROT SERPL-MCNC: 7.5 G/DL (ref 6–8.2)
PSA SERPL-MCNC: 1.33 NG/ML (ref 0–4)
RBC # BLD AUTO: 5.07 M/UL (ref 4.7–6.1)
SODIUM SERPL-SCNC: 135 MMOL/L (ref 135–145)
TRIGL SERPL-MCNC: 146 MG/DL (ref 0–149)
TSH SERPL DL<=0.005 MIU/L-ACNC: 2.4 UIU/ML (ref 0.3–3.7)
WBC # BLD AUTO: 9.4 K/UL (ref 4.8–10.8)

## 2017-07-11 PROCEDURE — 85025 COMPLETE CBC W/AUTO DIFF WBC: CPT

## 2017-07-11 PROCEDURE — 84153 ASSAY OF PSA TOTAL: CPT

## 2017-07-11 PROCEDURE — 82306 VITAMIN D 25 HYDROXY: CPT

## 2017-07-11 PROCEDURE — 80061 LIPID PANEL: CPT

## 2017-07-11 PROCEDURE — 84443 ASSAY THYROID STIM HORMONE: CPT

## 2017-07-11 PROCEDURE — 80053 COMPREHEN METABOLIC PANEL: CPT

## 2017-07-11 PROCEDURE — 36415 COLL VENOUS BLD VENIPUNCTURE: CPT

## 2017-07-12 NOTE — TELEPHONE ENCOUNTER
Notified patient and wife with labs, potassium still slightly low on triamterene Maxide, on 40 mEq twice a day, recommend adding extra 20 mEq at noon, instead he would like to try just increasing banana intake, I think that is fine since potassium really has not dropped compared to last year, he will at least continue on K-Felisha 20 mEq 2 tablets twice a day, continue other medications no changes

## 2017-09-12 ENCOUNTER — RX ONLY (OUTPATIENT)
Age: 72
Setting detail: RX ONLY
End: 2017-09-12

## 2017-09-29 ENCOUNTER — OFFICE VISIT (OUTPATIENT)
Dept: MEDICAL GROUP | Facility: MEDICAL CENTER | Age: 72
End: 2017-09-29
Payer: MEDICARE

## 2017-09-29 VITALS
HEIGHT: 70 IN | OXYGEN SATURATION: 92 % | BODY MASS INDEX: 38.51 KG/M2 | TEMPERATURE: 98.8 F | SYSTOLIC BLOOD PRESSURE: 142 MMHG | WEIGHT: 269 LBS | DIASTOLIC BLOOD PRESSURE: 80 MMHG | HEART RATE: 101 BPM

## 2017-09-29 DIAGNOSIS — M25.561 RIGHT KNEE PAIN, UNSPECIFIED CHRONICITY: ICD-10-CM

## 2017-09-29 DIAGNOSIS — E66.01 MORBID OBESITY DUE TO EXCESS CALORIES (HCC): ICD-10-CM

## 2017-09-29 DIAGNOSIS — Z00.00 PREVENTATIVE HEALTH CARE: Chronic | ICD-10-CM

## 2017-09-29 DIAGNOSIS — I10 ESSENTIAL HYPERTENSION: Chronic | ICD-10-CM

## 2017-09-29 DIAGNOSIS — Z23 FLU VACCINE NEED: ICD-10-CM

## 2017-09-29 DIAGNOSIS — Z12.11 COLON CANCER SCREENING: ICD-10-CM

## 2017-09-29 DIAGNOSIS — G89.29 OTHER CHRONIC PAIN: ICD-10-CM

## 2017-09-29 PROCEDURE — G0008 ADMIN INFLUENZA VIRUS VAC: HCPCS | Performed by: INTERNAL MEDICINE

## 2017-09-29 PROCEDURE — 90662 IIV NO PRSV INCREASED AG IM: CPT | Performed by: INTERNAL MEDICINE

## 2017-09-29 PROCEDURE — 99214 OFFICE O/P EST MOD 30 MIN: CPT | Mod: 25 | Performed by: INTERNAL MEDICINE

## 2017-09-29 RX ORDER — NAPROXEN 500 MG/1
500 TABLET ORAL 2 TIMES DAILY PRN
Qty: 180 TAB | Refills: 3 | Status: SHIPPED | OUTPATIENT
Start: 2017-09-29 | End: 2018-12-29 | Stop reason: SDUPTHER

## 2017-09-29 NOTE — PROGRESS NOTES
Subjective:      Julio Gonzalez is a 72 y.o. male who presents with htn Follow-Up            HPI     Follow-up hypertension, blood pressure is stable on losartan, Norvasc and triamterene hydrochlorothiazide, no regular excise because of chronic back pain, has been following Atkins diet with some weight loss and his diet plan has been successful for him previously.  Patient lives with wife and she also is on Atkins diet.  Tries to limit sweets  No tobacco, no alcohol  Dyslipidemia on Zocor no muscle pain or muscle aches  Chronic back pain followed by pain management  on Percocet 10 mg 4-5 times per day, trying to work on taper, has started taking naproxen 500 mg twice a day which has helped, patient has had no GI upset with naproxen, no history of peptic ulcer disease, no renal insufficiency.  Patient has been concerned because he keeps reading different things about patients on pain medication, is worried that the government will not make the medication available for him long-term, and he is worried about the stigma attached to taking chronic long-term opiate therapy.  Patient has been compliant with following up with pain management, he receives all pain medication from pain management.  Denies drowsiness, sedation, memory loss, confusion, depression, constipation, falls, respiratory suppression with chronic opiate therapy   History depression stable on Cymbalta, denies depression, good social support with wife  No tobacco, no alcohol      Current Outpatient Prescriptions   Medication Sig Dispense Refill   • zolpidem (AMBIEN) 10 MG Tab Take 1 Tab by mouth at bedtime as needed for Sleep. 90 Each 1   • losartan (COZAAR) 100 MG Tab TAKE ONE TABLET BY MOUTH DAILY 90 Tab 3   • triamterene/hctz (MAXZIDE-25/DYAZIDE) 37.5-25 MG Cap TAKE ONE CAPSULE BY MOUTH DAILY 90 Cap 3   • amlodipine (NORVASC) 10 MG Tab TAKE ONE TABLET BY MOUTH DAILY 90 Tab 3   • simvastatin (ZOCOR) 20 MG Tab TAKE ONE TABLET BY MOUTH  EVERY EVENING 90 Tab 3   • potassium chloride SA (KLOR-CON M20) 20 MEQ Tab CR Take 2 Tabs by mouth 2 times a day. 360 Tab 3   • duloxetine (CYMBALTA) 60 MG Cap DR Particles delayed-release capsule Take 1 Cap by mouth every day. 90 Cap 3   • Multiple Vitamins-Minerals (MULTI COMPLETE PO) Take  by mouth.     • ibuprofen (MOTRIN) 400 MG Tab Take 400 mg by mouth every 6 hours as needed.     • Oxycodone-Acetaminophen (PERCOCET-10)  MG Tab Take 1 Tab by mouth every four hours as needed for Moderate Pain. Ok to refill percocet 28-30 days after the percocet written on 5/24/16 is filled 180 Tab 0   • naproxen (NAPROSYN) 500 MG Tab Take 1 Tab by mouth 2 times a day as needed. 60 Tab 5   • aspirin (ASA) 81 MG Chew Tab chewable tablet Take 1 Tab by mouth every day. 100 Tab 11   • polyethylene glycol/lytes (MIRALAX) PACK Take 17 g by mouth every day.     • docusate sodium (COLACE) 100 MG CAPS Take 100 mg by mouth every day.     • vitamin D (CHOLECALCIFEROL) 1000 UNIT TABS Take 6,000 Units by mouth every day.     • diphenhydrAMINE (BENADRYL) 25 MG Tab Take 25 mg by mouth every 6 hours as needed for Sleep.     • desonide (TRIDESILON) 0.05 % Cream Apply 1 Application to affected area(s) 1 time daily as needed (rash). 45 g 3     No current facility-administered medications for this visit.      Patient Active Problem List    Diagnosis Date Noted   • Speech and language deficit, late effect of cerebrovascular disease 05/20/2015     Priority: Low   • Fibromyalgia 03/26/2016   • Arthritis of knee, right 09/03/2014   • Depression, major, in partial remission (HCC) 12/06/2013   • Chronic pain 12/06/2013   • Insomnia 02/08/2013   • MEDICAL HOME 08/29/2012   • History of stroke 08/05/2011   • Plantar fasciitis 11/02/2010   • Obesity 09/17/2009   • S/P hip replacement 09/16/2009   • Status post lumbar surgery 09/16/2009   • Cervical pain 09/16/2009   • Hypertension 09/16/2009   • Paroxysmal atrial fibrillation (CMS-HCC) 09/16/2009   •  Dyslipidemia 09/16/2009   • Skin lesion 09/16/2009   • Preventative health care 09/16/2009   • S/P knee replacement 09/16/2009         Status post lumbar surgery  2/01 MRI lumbar spine DJD L5-S1 anterolisthesis 4-5 mm marked stenosis saw  neurosurgery  9/09  neurosurgery note  12/09 neurosurgery note, severe left-sided foraminal stenosis, L5-S1 spondylolisthesis 4-5 mm recommend surgery, patient declined, has had epidural with no benefit,tried lyrica and neurontin before, declines cymbalta trial  4/10 note dr. johnson neurosurgery who gives patient norco, he is retiring, and has offered patient surgical therapy versus continuing norco 10 mg which we'll assume dispensing.  10/11 MRI lumbar spine grade 1 spondylolisthesis 9mm, bilateral spondylosis L5, moderate bilateral L5 stenosis, 2.4 cm left kidney cyst  10/11 xray LS, grade 1 spondylolisthesis L5 on S1 increased from prior exam, 1.3 cm, no significant change in flexion  11/11  pain note right L5-S1 transforaminal epidural  1/16/12 start cymbalta trial (3/12 stop cymbalta no benefit)  1/12 dr. murphy neurosurgery note surgical intervention offered L4 to sacrum decompression  3/26/12 restarted cymbalta    9/26/12  neurosurgery operative note decompression at L5-S1and bilateral foraminotomies,transforaminal lumbar interbody fusion, L4-L5 and L5-S1, with expandable cage 8-12 mm.  2/20/13 dr. murphy neurosurgery note, lumbar films, EMG NCS lower ext inconclusive, repeat MRI lumbar spine pending  3/13/13 MRI lumbar spine postoperative changes L4-S1 lumbar laminectomy, interbody fusion, disc space insert L4-L5 L5-S1, posterior spinal fusion and fixation, L5-S1 moderate right foraminal stenosis. 2.4 cm mid right renal cyst unchanged  3/26/13 dr. murphy neurosurgery note, no further surgical intervention necessary, chronic narcotics norco 10 mg 5 per day; I will assume the norco rx from   1/25/15 off naprosyn and cymbalta; on  norco 10 mg one every four hours #150  5/21/15 on percocet 10 mg five per day and on cymbalta 60 mg  3/25/16 start lyrica 50 mg tid for fibromyalgia, continue percocet 10 mg 5 per day and cymbalta 60 mg  4/12/16 increase lyrica to 100 mg tid (50 mg two tid), continue cymbalta 60 mg and percocet 10 mg 5 per day  9/26/16 off lyrica, on percocet 10 mg qid per  pain management and cymbalta     Speech language deficit  1999 affected right arm, no old records  Some diff with short recall, and occasional diff with expressing self when fatigued     Status post knee replacement left 2001     Status post hip replacement bilateral  12/03 right total hip replacement  1/04 left total hip replacement       Preventative health  4/6/07 colonoscopy [facility] negative  9/17/09 pneumovax  10/26/10 zoster vaccine  8/5/11 td  8/27/15 prevnar at [facility]  11/2/16 FIT negative  7/11/17 psa 1.3  7/11/17 vit d 52     Paroxysmal atrial fibrillation  Sees [facility]  2005 s/p maze procedure with a stroke related to that as a complication, have no records at all regarding that, no CT scan or MRI scan and probably had a cardiac catheterization by renal physicians in 2005 that was negative for  Coronary artery disease.  8/11 [facility] note EKG NSR  5/12 [facility] note, on asa daily  11/12 [facility] note  4/29/13 cardiology note  11/3/13 cardiology note on asa  9/22/14 cardiology note sinus, follow up 6 months  5/12/15  cardiology note, paroxysmal atrial fibrillation status post maze operation, sinus rhythm  11/18/15 cardiology note stable follow one year  4/4/17  cardiology note, paroxysmal atrial fibrillation no recurrence, follow-up one year     Insomnia  2009 on ambien 10 mg  2/19/16 unable to taper ambien, referral to behavioral sleep psychology recommended he declines     Hypertension  7/10 [facility] note change from hyzaar to enalapril hct 10/25  1/11 [facility] note bp not controlled on enalapril hct 10/25, change back to losartan hct 100/25 and  norvasc 10 mg  6/12 K+ 3.0, increase kdur to 20 tid, consider decreasing hctz if possible to decrease K+ supplementation  12/12 cozaar 100 mg, dyazide 37.5/25 mg, kcl 20 meq, norvasc 10 mg  1/29/14 on cozaar 100 mg, dyazide 37.5/25 mg, norvasc 10 mg, klor 20 meq bid; will increase to klor 20 meq tid  9/3//14 K+ 3.1 on klor 20 meq tid, increase to 20 meq two tab bid  9/3/14 urine mac <0.5 on cozaar 100 mg, dyazide 37.5/25 mg, norvasc 10 mg, klor 20 meq two tab bid  6/30/15 Na 132,K+ 3.5 on cozaar 100 mg, dyazide 37.5/25 mg, norvasc 10 mg, klor 20 meq two tab bid  8/31/16 K+ 3.3 on cozaar 100 mg, dyazide 37.5/25 mg, norvasc 10 mg, klor 20 meq two tab qday will increase to 2 bid  7/11/17 K+3.3 on cozaar 100 mg, dyazide 37.5/25 mg, norvasc 10 mg, klor 20 meq two bid     History stroke  1999 affected right arm, no old records  Some difficulty with short recall, and occasional diff with expressing self when fatigued     Fibromyalgia  3/25/16 start lyrica 50 mg tid for fibromyalgia, continue percocet 10 mg 5 per day and cymbalta 60 mg  4/12/16 increase lyrica to 100 mg tid (50 mg two tid), continue cymbalta 60 mg and percocet 10 mg 5 per day  4/28/16 increase lyrica to 150 mg tid, continue cymbalta 60 mg and percocet 10 mg five times per day  5/10/16 on lyrica 50 mg three tabs tid, change to 100 mg am, 150 mg noon, 100 mg pm     Dyslipidemia  Followed by cardiology  2/09 cholesterol 135, triglycerides 100, HDL 47, LDL 68  8/09 chol 138,trig 101,hdl 49,ldl 69  3/10 chol 123,trig 66,hdl 49,ldl 61  7/10 [facility] note change vytorin to zocor 40  11/10 chol 141,trig 75,hdl 62,ldl 64 on vytorin 10/20 mg per [facility]  10/11 chol 159,trig 102,hdl 52,ldl 87 on vytorin 10/20 per [facility]  2/29/12 stop vytorin, change to zocor 20 mg per [facility]  6/12 chol 152,trig 102,hdl 49,ldl 83 on zocor 20 mg  6/13 chol 150,trig 130,hdl 52,ldl 72 on zocor 20 mg  1/29/14 chol 147,trig 127,hdl 50,ldl 72 on zocor 20 mg  9/3/14 chol 169,trig 228,hdl 49,ldl 74 on zocor  20 mg  6/30/15 chol 151,trig 88,hdl 63,ldl 70 on zocor 20 mg  8/31/16 chol 169,trig 130,hdl 59,ldl 84 on zocor 20 mg  7/11/17 chol 147,trig 146,hdl 51,ldl 67 on zocor 20 mg     Depression  3/12 tried cymalta for pain no benefit  12/6/13 PHQ 9 score 15, start cymbalta samples 30 mg x 7 days, then 60 mg  12/20/13 cymbalta 60 mg  1/22/15 off cymbalta    5/21/15 on cymbalta 60 mg  2/19/16 referral to behavioral health recommended he declines     Chronic opiate  3/26/13 dr. murphy neurosurgery note, no further surgical intervention necessary, chronic narcotics norco 10 mg 5 per day; I will assume the norco rx from   12/6/13 change from hydrocodone 10 mg 4-5 tablets per day to oxycodone 10 mg 4-5 tabs per day  1/22/15 UDT millenium consistent  2/24/15 change from hydrocodone 10 mg to oxycodone 10 mg (percocet) qid #150 per 30 days  1/22/14 norco 10 mg #180, millenium URT done  2/24/15 change to percocet 10 mg #150 only one prescription provided, if successful for pain relief call us and I will write next 3 refills that he can  and then followup in office after that  5/21/15 percocet 10 mg #150 refill  5/21/15 narcotic contract  5/21/15 opiate risk score 1  8/24/15 refill percocet #150 x 3  8/24/15 referral pain management  10/4/15   2/19/16   2/19/16 increase frequency to percocet 10 mg q 4 hours #180 per month, declined trial of MS contin, because of increasing utilization, referral made to pain management   5/24/16   5/24/16 UDT millennium  5/24/16 refill percocet #150 x 2 refer to  pain management to assume opiate prescription writing   6/22/16  pain management note, initiate pregabalin, work on taper oxycodone  7/6/16  continue oxycodone and pregabalin  8/9/16  pain management note continue oxycodone, consider ultrasound-guided knee injections to right  9/12/16  pain management note, UDT consistent     Cervical pain  3/06 MRI cervical spine  "moderate subdural frontal stenosis C5-C6 lesser extent C6-C7     Arthritis knee  9/3/14 x-ray right knee marked tricompartmental osteoarthritis, most severe medial compartment  2/19/16 has seen  orthopedics, declines knee replacement            ROS       Objective:     /80   Pulse (!) 101   Temp 37.1 °C (98.8 °F)   Ht 1.778 m (5' 10\")   Wt 122 kg (269 lb)   SpO2 92%   BMI 38.60 kg/m²      Physical Exam   Constitutional: He appears well-developed and well-nourished. No distress.   HENT:   Head: Normocephalic and atraumatic.   Mouth/Throat: Oropharynx is clear and moist.   Eyes: Conjunctivae are normal. Right eye exhibits no discharge. Left eye exhibits no discharge. No scleral icterus.   Neck: No JVD present.   Cardiovascular: Normal rate, regular rhythm and normal heart sounds.    Pulmonary/Chest: Effort normal and breath sounds normal. No respiratory distress. He has no wheezes.   Abdominal: He exhibits no distension.   Musculoskeletal: He exhibits no edema.   Neurological: He is alert.   Skin: Skin is warm. He is not diaphoretic.   Psychiatric: He has a normal mood and affect. His behavior is normal. Judgment and thought content normal.   Nursing note and vitals reviewed.              Assessment/Plan:     Assessment  #! Hypertension stable and controlled on Maxzide, Norvasc, losartan, no lightheadedness or dizziness    #2 chronic low back pain stable and controlled followed by pain management Dr. Campbell on Percocet 10 mg 4-5 tabs daily working on taper, also taking naproxen 5 a milligrams twice daily as needed, no GI upset, no history of renal insufficiency    #3 obesity BMI 38.6 has declined nutrition consultation    #4 depression stable on Cymbalta        Plan  #1 continue blood pressure medication no changes, check blood pressure periodically and record    #2 influenza vaccine high-dose    #3 declines colonoscopy    #4 FIT card provided    #5 weight loss encouraged, declines titration " consultation, importance of weight loss discussed with regards to development of diabetes and cardiovascular disease    #6 continue Cymbalta, continue no alcohol    #7 follow-up pain management    #8 I spent over 50% of the office visit 25 minutes discussing with the patient and wife the rationale for monitoring on prescription narcotics, side effects long-term with regards to chronic opiate therapy, importance following up with his chronic pain medicine specialist, exercise, nutrition, weight loss, no alcohol on medication, also the rationale for testing and monitoring while on pain medication including random urine test, checking prescription monitoring program, patient understands that this encompasses everyone in the [place] given the incidence of opiate-related side effects and narcotic related overdose, substance abuse, patient is not being targeted or persecuted, this really should not be a prosecutorial or punitive issue, this should be more about patient and provider cooperation, communication and information and education, patient always follows his prescribed program, does not ask for early refills, has always had consistent , does not lose the medication, patient is worried that some pharmacies are requiring patient to refill these medications weekly, I told them that should that happen I would recommend going to another pharmacy, he can also discuss this with his pain management specialist.  Long-term risk of habituation, dependence, memory loss, depression, falls, constipation, respiratory suppression discussed    #9 follow-up 4 months

## 2017-11-12 ENCOUNTER — HOSPITAL ENCOUNTER (OUTPATIENT)
Facility: MEDICAL CENTER | Age: 72
End: 2017-11-12
Attending: INTERNAL MEDICINE
Payer: MEDICARE

## 2017-11-12 PROCEDURE — 82274 ASSAY TEST FOR BLOOD FECAL: CPT

## 2017-11-16 ENCOUNTER — TELEPHONE (OUTPATIENT)
Dept: MEDICAL GROUP | Facility: MEDICAL CENTER | Age: 72
End: 2017-11-16

## 2017-11-16 LAB — HEMOCCULT STL QL IA: NEGATIVE

## 2017-11-17 ENCOUNTER — TELEPHONE (OUTPATIENT)
Dept: MEDICAL GROUP | Facility: MEDICAL CENTER | Age: 72
End: 2017-11-17

## 2017-11-17 NOTE — TELEPHONE ENCOUNTER
----- Message from Norman Peterson M.D. sent at 11/16/2017 10:46 PM PST -----  Please notify patient that the stool test is negative

## 2017-12-21 RX ORDER — DULOXETIN HYDROCHLORIDE 60 MG/1
60 CAPSULE, DELAYED RELEASE ORAL DAILY
Qty: 90 CAP | Refills: 2 | Status: SHIPPED | OUTPATIENT
Start: 2017-12-21 | End: 2018-09-06 | Stop reason: SDUPTHER

## 2018-01-04 DIAGNOSIS — G47.00 INSOMNIA, UNSPECIFIED TYPE: ICD-10-CM

## 2018-01-04 RX ORDER — ZOLPIDEM TARTRATE 10 MG/1
10 TABLET ORAL NIGHTLY PRN
Qty: 90 EACH | Refills: 1 | Status: SHIPPED
Start: 2018-01-04 | End: 2018-04-04

## 2018-01-30 ENCOUNTER — OFFICE VISIT (OUTPATIENT)
Dept: MEDICAL GROUP | Facility: MEDICAL CENTER | Age: 73
End: 2018-01-30
Payer: MEDICARE

## 2018-01-30 ENCOUNTER — TELEPHONE (OUTPATIENT)
Dept: MEDICAL GROUP | Facility: MEDICAL CENTER | Age: 73
End: 2018-01-30

## 2018-01-30 VITALS
HEIGHT: 70 IN | OXYGEN SATURATION: 94 % | SYSTOLIC BLOOD PRESSURE: 130 MMHG | BODY MASS INDEX: 39.65 KG/M2 | WEIGHT: 277 LBS | TEMPERATURE: 98.2 F | HEART RATE: 65 BPM | DIASTOLIC BLOOD PRESSURE: 84 MMHG

## 2018-01-30 DIAGNOSIS — F11.20 OPIATE DEPENDENCE, CONTINUOUS (HCC): ICD-10-CM

## 2018-01-30 DIAGNOSIS — F33.41 RECURRENT MAJOR DEPRESSIVE DISORDER, IN PARTIAL REMISSION (HCC): ICD-10-CM

## 2018-01-30 DIAGNOSIS — I10 ESSENTIAL HYPERTENSION: Chronic | ICD-10-CM

## 2018-01-30 PROCEDURE — 99213 OFFICE O/P EST LOW 20 MIN: CPT | Performed by: INTERNAL MEDICINE

## 2018-01-30 ASSESSMENT — PATIENT HEALTH QUESTIONNAIRE - PHQ9: CLINICAL INTERPRETATION OF PHQ2 SCORE: 0

## 2018-01-30 NOTE — PROGRESS NOTES
Subjective:      Julio Gonzalez is a 72 y.o. male who presents with Pain            HPI     Here follow up blood pressure has been 130/80s at home taking periodically, remains on losartan, amlodipine, did not gain weight during the holidays because of diet, has not been eating more sweets.  Trying to follow a low-salt diet.  Not able to exercise because of chronic back pain followed by pain management, remains on Percocet for chronic low back pain without side effects.  Back pain has been stable.  Unable to exercise however, because of back pain.  No cane or walker for ambulation.  No falls.  Pain medication prescribed by by pain management, denies any drowsiness, sedation, memory loss, confusion, depression related to chronic pain medication.  Remains on Cymbalta for depression, mood has been stable and controlled during the holidays, no worsening depression.  No tobacco, no alcohol        Current Outpatient Prescriptions   Medication Sig Dispense Refill   • zolpidem (AMBIEN) 10 MG Tab Take 1 Tab by mouth at bedtime as needed for Sleep for up to 90 days. 90 Each 1   • duloxetine (CYMBALTA) 60 MG Cap DR Particles delayed-release capsule Take 1 Cap by mouth every day. 90 Cap 2   • naproxen (NAPROSYN) 500 MG Tab Take 1 Tab by mouth 2 times a day as needed (pain). 180 Tab 3   • losartan (COZAAR) 100 MG Tab TAKE ONE TABLET BY MOUTH DAILY 90 Tab 3   • triamterene/hctz (MAXZIDE-25/DYAZIDE) 37.5-25 MG Cap TAKE ONE CAPSULE BY MOUTH DAILY 90 Cap 3   • amlodipine (NORVASC) 10 MG Tab TAKE ONE TABLET BY MOUTH DAILY 90 Tab 3   • simvastatin (ZOCOR) 20 MG Tab TAKE ONE TABLET BY MOUTH EVERY EVENING 90 Tab 3   • potassium chloride SA (KLOR-CON M20) 20 MEQ Tab CR Take 2 Tabs by mouth 2 times a day. 360 Tab 3   • duloxetine (CYMBALTA) 60 MG Cap DR Particles delayed-release capsule Take 1 Cap by mouth every day. 90 Cap 3   • Multiple Vitamins-Minerals (MULTI COMPLETE PO) Take  by mouth.     • diphenhydrAMINE (BENADRYL) 25 MG  Tab Take 25 mg by mouth every 6 hours as needed for Sleep.     • ibuprofen (MOTRIN) 400 MG Tab Take 400 mg by mouth every 6 hours as needed.     • Oxycodone-Acetaminophen (PERCOCET-10)  MG Tab Take 1 Tab by mouth every four hours as needed for Moderate Pain. Ok to refill percocet 28-30 days after the percocet written on 5/24/16 is filled 180 Tab 0   • desonide (TRIDESILON) 0.05 % Cream Apply 1 Application to affected area(s) 1 time daily as needed (rash). 45 g 3   • aspirin (ASA) 81 MG Chew Tab chewable tablet Take 1 Tab by mouth every day. 100 Tab 11   • polyethylene glycol/lytes (MIRALAX) PACK Take 17 g by mouth every day.     • docusate sodium (COLACE) 100 MG CAPS Take 100 mg by mouth every day.     • vitamin D (CHOLECALCIFEROL) 1000 UNIT TABS Take 6,000 Units by mouth every day.       No current facility-administered medications for this visit.      Patient Active Problem List   Diagnosis   • S/P hip replacement   • Status post lumbar surgery   • Cervical pain   • Hypertension   • Paroxysmal atrial fibrillation (CMS-HCC)   • Dyslipidemia   • Skin lesion   • Preventative health care   • S/P knee replacement   • Obesity   • Plantar fasciitis   • History of stroke   • MEDICAL HOME   • Insomnia   • Depression, major, in partial remission ([facility])   • Chronic pain   • Arthritis of knee, right   • Speech and language deficit, late effect of cerebrovascular disease   • Fibromyalgia          Status post lumbar surgery  2/01 MRI lumbar spine DJD L5-S1 anterolisthesis 4-5 mm marked stenosis saw  neurosurgery  9/09  neurosurgery note  12/09 neurosurgery note, severe left-sided foraminal stenosis, L5-S1 spondylolisthesis 4-5 mm recommend surgery, patient declined, has had epidural with no benefit,tried lyrica and neurontin before, declines cymbalta trial  4/10 note dr. johnson neurosurgery who gives patient norco, he is retiring, and has offered patient surgical therapy versus continuing norco 10 mg  which we'll assume dispensing.  10/11 MRI lumbar spine grade 1 spondylolisthesis 9mm, bilateral spondylosis L5, moderate bilateral L5 stenosis, 2.4 cm left kidney cyst  10/11 xray LS, grade 1 spondylolisthesis L5 on S1 increased from prior exam, 1.3 cm, no significant change in flexion  11/11  pain note right L5-S1 transforaminal epidural  1/16/12 start cymbalta trial (3/12 stop cymbalta no benefit)  1/12 dr. murphy neurosurgery note surgical intervention offered L4 to sacrum decompression  3/26/12 restarted cymbalta    9/26/12  neurosurgery operative note decompression at L5-S1and bilateral foraminotomies,transforaminal lumbar interbody fusion, L4-L5 and L5-S1, with expandable cage 8-12 mm.  2/20/13 dr. murphy neurosurgery note, lumbar films, EMG NCS lower ext inconclusive, repeat MRI lumbar spine pending  3/13/13 MRI lumbar spine postoperative changes L4-S1 lumbar laminectomy, interbody fusion, disc space insert L4-L5 L5-S1, posterior spinal fusion and fixation, L5-S1 moderate right foraminal stenosis. 2.4 cm mid right renal cyst unchanged  3/26/13 dr. murphy neurosurgery note, no further surgical intervention necessary, chronic narcotics norco 10 mg 5 per day; I will assume the norco rx from   1/25/15 off naprosyn and cymbalta; on norco 10 mg one every four hours #150  5/21/15 on percocet 10 mg five per day and on cymbalta 60 mg  3/25/16 start lyrica 50 mg tid for fibromyalgia, continue percocet 10 mg 5 per day and cymbalta 60 mg  4/12/16 increase lyrica to 100 mg tid (50 mg two tid), continue cymbalta 60 mg and percocet 10 mg 5 per day  9/26/16 off lyrica, on percocet 10 mg qid per  pain management and cymbalta     Speech language deficit  1999 affected right arm, no old records  Some diff with short recall, and occasional diff with expressing self when fatigued     Status post knee replacement left 2001     Status post hip replacement bilateral  12/03 right total hip  replacement  1/04 left total hip replacement      [facility]  4/6/07 colonoscopy [facility] negative  9/17/09 pneumovax  10/26/10 zoster vaccine  8/5/11 td  8/27/15 prevnar at [facility]  7/11/17 psa 1.3  7/11/17 vit d 52  9/29/17 declines colonoscopy,FIT card provided  11/16/17 FIT negative    Paroxysmal atrial fibrillation  Sees [facility]  2005 s/p maze procedure with a stroke related to that as a complication, have no records at all regarding that, no CT scan or MRI scan and probably had a cardiac catheterization by renal physicians in 2005 that was negative for  Coronary artery disease.  8/11 [facility] note EKG NSR  5/12 [facility] note, on asa daily  11/12 [facility] note  4/29/13 cardiology note  11/3/13 cardiology note on asa  9/22/14 cardiology note sinus, follow up 6 months  5/12/15  cardiology note, paroxysmal atrial fibrillation status post maze operation, sinus rhythm  11/18/15 cardiology note stable follow one year  4/4/17  cardiology note, paroxysmal atrial fibrillation no recurrence, follow-up one year     Insomnia  2009 on ambien 10 mg  2/19/16 unable to taper ambien, referral to behavioral sleep psychology recommended he declines     Hypertension  7/10 [facility] note change from hyzaar to enalapril hct 10/25  1/11 [facility] note bp not controlled on enalapril hct 10/25, change back to losartan hct 100/25 and norvasc 10 mg  6/12 K+ 3.0, increase kdur to 20 tid, consider decreasing hctz if possible to decrease K+ supplementation  12/12 cozaar 100 mg, dyazide 37.5/25 mg, kcl 20 meq, norvasc 10 mg  1/29/14 on cozaar 100 mg, dyazide 37.5/25 mg, norvasc 10 mg, klor 20 meq bid; will increase to klor 20 meq tid  9/3//14 K+ 3.1 on klor 20 meq tid, increase to 20 meq two tab bid  9/3/14 urine mac <0.5 on cozaar 100 mg, dyazide 37.5/25 mg, norvasc 10 mg, klor 20 meq two tab bid  6/30/15 Na 132,K+ 3.5 on cozaar 100 mg, dyazide 37.5/25 mg, norvasc 10 mg, klor 20 meq two tab bid  8/31/16 K+ 3.3 on cozaar 100 mg, dyazide 37.5/25  mg, norvasc 10 mg, klor 20 meq two tab qday will increase to 2 bid  7/11/17 K+3.3 on cozaar 100 mg, dyazide 37.5/25 mg, norvasc 10 mg, klor 20 meq two bid     History stroke  1999 affected right arm, no old records  Some difficulty with short recall, and occasional diff with expressing self when fatigued     Fibromyalgia  3/25/16 start lyrica 50 mg tid for fibromyalgia, continue percocet 10 mg 5 per day and cymbalta 60 mg  4/12/16 increase lyrica to 100 mg tid (50 mg two tid), continue cymbalta 60 mg and percocet 10 mg 5 per day  4/28/16 increase lyrica to 150 mg tid, continue cymbalta 60 mg and percocet 10 mg five times per day  5/10/16 on lyrica 50 mg three tabs tid, change to 100 mg am, 150 mg noon, 100 mg pm     Dyslipidemia  Followed by cardiology  2/09 cholesterol 135, triglycerides 100, HDL 47, LDL 68  8/09 chol 138,trig 101,hdl 49,ldl 69  3/10 chol 123,trig 66,hdl 49,ldl 61  7/10 [facility] note change vytorin to zocor 40  11/10 chol 141,trig 75,hdl 62,ldl 64 on vytorin 10/20 mg per [facility]  10/11 chol 159,trig 102,hdl 52,ldl 87 on vytorin 10/20 per [facility]  2/29/12 stop vytorin, change to zocor 20 mg per [facility]  6/12 chol 152,trig 102,hdl 49,ldl 83 on zocor 20 mg  6/13 chol 150,trig 130,hdl 52,ldl 72 on zocor 20 mg  1/29/14 chol 147,trig 127,hdl 50,ldl 72 on zocor 20 mg  9/3/14 chol 169,trig 228,hdl 49,ldl 74 on zocor 20 mg  6/30/15 chol 151,trig 88,hdl 63,ldl 70 on zocor 20 mg  8/31/16 chol 169,trig 130,hdl 59,ldl 84 on zocor 20 mg  7/11/17 chol 147,trig 146,hdl 51,ldl 67 on zocor 20 mg     Depression  3/12 tried cymalta for pain no benefit  12/6/13 PHQ 9 score 15, start cymbalta samples 30 mg x 7 days, then 60 mg  12/20/13 cymbalta 60 mg  1/22/15 off cymbalta    5/21/15 on cymbalta 60 mg  2/19/16 referral to behavioral health recommended he declines     Chronic opiate  3/26/13 dr. murphy neurosurgery note, no further surgical intervention necessary, chronic narcotics norco 10 mg 5 per day; I will assume the norco rx from    12/6/13 change from hydrocodone 10 mg 4-5 tablets per day to oxycodone 10 mg 4-5 tabs per day  1/22/15 UDT millenium consistent  2/24/15 change from hydrocodone 10 mg to oxycodone 10 mg (percocet) qid #150 per 30 days  1/22/14 norco 10 mg #180, millenium URT done  2/24/15 change to percocet 10 mg #150 only one prescription provided, if successful for pain relief call us and I will write next 3 refills that he can  and then followup in office after that  5/21/15 percocet 10 mg #150 refill  5/21/15 narcotic contract  5/21/15 opiate risk score 1  8/24/15 refill percocet #150 x 3  8/24/15 referral pain management  10/4/15   2/19/16   2/19/16 increase frequency to percocet 10 mg q 4 hours #180 per month, declined trial of MS contin, because of increasing utilization, referral made to pain management   5/24/16   5/24/16 UDT millennium  5/24/16 refill percocet #150 x 2 refer to  pain management to assume opiate prescription writing   6/22/16  pain management note, initiate pregabalin, work on taper oxycodone  7/6/16  continue oxycodone and pregabalin  8/9/16  pain management note continue oxycodone, consider ultrasound-guided knee injections to right  9/12/16  pain management note, UDT consistent     Cervical pain  3/06 MRI cervical spine moderate subdural frontal stenosis C5-C6 lesser extent C6-C7     Arthritis knee  9/3/14 x-ray right knee marked tricompartmental osteoarthritis, most severe medial compartment  2/19/16 has seen  orthopedics, declines knee replacement       Depression Screening    Little interest or pleasure in doing things?  0 - not at all  Feeling down, depressed , or hopeless? 0 - not at all  Patient Health Questionnaire Score: 0     If depressive symptoms identified deferred to follow up visit unless specifically addressed in assessment and plan.    Interpretation of PHQ-9 Total Score   Score Severity   1-4 No  Depression   5-9 Mild Depression   10-14 Moderate Depression   15-19 Moderately Severe Depression   20-27 Severe Depression    Screening for Cognitive Impairment    Three Minute Recall (apple, watch, judson)  2/3    Draw clock face with all 12 numbers set to the hand to show 10 minutes past 11 o'clock  1    Cognitive concerns identified deferred for follow up unless specifically addressed in assessment and plan.    Fall Risk Assessment    Has the patient had two or more falls in the last year or any fall with injury in the last year?  No    Safety Assessment    Throw rugs on floor.  Yes  Handrails on all stairs.  No  Good lighting in all hallways.  Yes  Difficulty hearing.  No  Patient counseled about all safety risks that were identified.    Functional Assessment ADLs    Are there any barriers preventing you from cooking for yourself or meeting nutritional needs?  No.    Are there any barriers preventing you from driving safely or obtaining transportation?  No.    Are there any barriers preventing you from using a telephone or calling for help?  No.    Are there any barriers preventing you from shopping?  No.    Are there any barriers preventing you from taking care of your own finances?  No.    Are there any barriers preventing you from managing your medications?  No.    Are currently engaging any exercise or physical activity?  No.       Health Maintenance Summary                IMM DTaP/Tdap/Td Vaccine Overdue 8/6/2011      Done 8/5/2011 Imm Admin: TD Vaccine    IMM PNEUMOCOCCAL 65+ (ADULT) LOW/MEDIUM RISK SERIES Overdue 8/27/2016      Done 8/27/2015 Imm Admin: Pneumococcal Conjugate Vaccine (Prevnar/PCV-13)     Patient has more history with this topic...    COLONOSCOPY Overdue 4/6/2017      Done 4/6/2007 REFERRAL TO GI FOR COLONOSCOPY     Patient has more history with this topic...    Annual Wellness Visit Overdue 11/30/2017      Done 11/29/2016 Visit Dx: Medicare annual wellness visit, subsequent     Patient  "has more history with this topic...          Patient Care Team:  Norman Peterson M.D. as PCP - General  Jose Martin Capmbell M.D. as Consulting Physician (Anesthesia)  Chris Beltran M.D. as Consulting Physician (Neurosurgery)  Irineo Skinner M.D. as Consulting Physician (Cardiac Electrophysiology)  Ptee Rangel O.D. as Consulting Physician (Optometry)    ROS       Objective:     /84   Pulse 65   Temp 36.8 °C (98.2 °F)   Ht 1.778 m (5' 10\")   Wt (!) 125.6 kg (277 lb)   SpO2 94%   BMI 39.75 kg/m²      Physical Exam   Constitutional: He appears well-developed and well-nourished. No distress.   HENT:   Head: Normocephalic and atraumatic.   Eyes: Conjunctivae are normal. Right eye exhibits no discharge. Left eye exhibits no discharge.   Neck: Neck supple. No JVD present.   Cardiovascular: Normal rate and regular rhythm.    Pulmonary/Chest: Effort normal and breath sounds normal. He has no wheezes.   Abdominal: He exhibits no distension.   Neurological: He is alert.   Skin: He is not diaphoretic.   Psychiatric: He has a normal mood and affect. His behavior is normal.   Nursing note and vitals reviewed.              Assessment/Plan:      Assessment  #1 hypertension stable and controlled on losartan and amlodipine, no side effects of medication, also is on Maxzide    #2 history of depression currently stable no recurrence on Cymbalta    #3 chronic low back pain followed by Dr. Campbell pain management on oxycodone 10 mg 6 tablets per day, pain is from a stable and controlled without side effects from chronic opiate therapy    #4 chronic opiate therapy oxycodone 10 mg per pain management Dr. Campbell who monitors and follows the patient's pain levels, prescription medication for him, patient denies any side effects of medication    #5 obesity BMI 39.7 has had previous bariatric surgery 1980s, declines follow-up nutrition causing her bariatric evaluation        Plan  #1 continue losartan and amlodipine, check blood " pressure periodically and record    #2 low-sodium diet    #3 recommend some type of exercise walking or stationary bike 30 minutes daily    #4 follow-up pain management Dr. Campbell for oxycodone prescription    #5 declines nutrition consultation, understands importance of weight loss with regards to blood pressure, and development of diabetes and cardiovascular disease    #6 has had influenza vaccine    #7 follow-up 4-6 months

## 2018-01-31 DIAGNOSIS — I10 HYPERTENSION, UNSPECIFIED TYPE: ICD-10-CM

## 2018-01-31 RX ORDER — POTASSIUM CHLORIDE 20 MEQ/1
40 TABLET, EXTENDED RELEASE ORAL 2 TIMES DAILY
Qty: 360 TAB | Refills: 3 | Status: SHIPPED | OUTPATIENT
Start: 2018-01-31 | End: 2019-01-19 | Stop reason: SDUPTHER

## 2018-04-17 DIAGNOSIS — Z00.00 PREVENTATIVE HEALTH CARE: Chronic | ICD-10-CM

## 2018-04-17 DIAGNOSIS — Z23 NEED FOR ZOSTER VACCINE: ICD-10-CM

## 2018-06-07 NOTE — PROGRESS NOTES
Outcome: Left Message    Please transfer to Patient Outreach Team at [phone] when patient returns call.    WebIZ Checked & Epic Updated:  no    [facility] Verified: yes    Attempt # AWV PROJECT 1ST ATTEMPT

## 2018-07-02 ENCOUNTER — TELEPHONE (OUTPATIENT)
Dept: MEDICAL GROUP | Facility: MEDICAL CENTER | Age: 73
End: 2018-07-02

## 2018-07-09 ENCOUNTER — OFFICE VISIT (OUTPATIENT)
Dept: MEDICAL GROUP | Facility: MEDICAL CENTER | Age: 73
End: 2018-07-09
Payer: MEDICARE

## 2018-07-09 VITALS
HEIGHT: 70 IN | SYSTOLIC BLOOD PRESSURE: 128 MMHG | BODY MASS INDEX: 40.52 KG/M2 | HEART RATE: 100 BPM | DIASTOLIC BLOOD PRESSURE: 80 MMHG | OXYGEN SATURATION: 91 % | RESPIRATION RATE: 16 BRPM | TEMPERATURE: 98.2 F | WEIGHT: 283 LBS

## 2018-07-09 DIAGNOSIS — G47.00 INSOMNIA, UNSPECIFIED TYPE: Chronic | ICD-10-CM

## 2018-07-09 DIAGNOSIS — R73.09 IMPAIRED GLUCOSE METABOLISM: ICD-10-CM

## 2018-07-09 DIAGNOSIS — F33.41 RECURRENT MAJOR DEPRESSIVE DISORDER, IN PARTIAL REMISSION (HCC): Chronic | ICD-10-CM

## 2018-07-09 DIAGNOSIS — E55.9 HYPOVITAMINOSIS D: ICD-10-CM

## 2018-07-09 DIAGNOSIS — Z12.5 PROSTATE CANCER SCREENING: ICD-10-CM

## 2018-07-09 DIAGNOSIS — F11.20 OPIATE DEPENDENCE, CONTINUOUS (HCC): Chronic | ICD-10-CM

## 2018-07-09 DIAGNOSIS — E78.5 DYSLIPIDEMIA: ICD-10-CM

## 2018-07-09 DIAGNOSIS — E66.01 CLASS 3 SEVERE OBESITY DUE TO EXCESS CALORIES WITH SERIOUS COMORBIDITY AND BODY MASS INDEX (BMI) OF 40.0 TO 44.9 IN ADULT (HCC): ICD-10-CM

## 2018-07-09 DIAGNOSIS — I48.0 PAROXYSMAL ATRIAL FIBRILLATION (HCC): Chronic | ICD-10-CM

## 2018-07-09 PROCEDURE — 99213 OFFICE O/P EST LOW 20 MIN: CPT | Performed by: INTERNAL MEDICINE

## 2018-07-09 NOTE — PROGRESS NOTES
Subjective:      Julio Gonzalez is a 72 y.o. male who presents with htn           HPI     Here follow up blood pressure on cozaar 100 mg, dyazide 37.5/25 mg, norvasc 10 mg, klor 20 meq two bid running 120-140 at home, sees cardiology no chest pain, shortness of breath, cough, lightheadedness, syncope, palpitations. Had gained weight during the winter and spring, has started to diet and lose weight again, no exercise, limited knee pain mostly, sees pain management  on oxycodone 10 mg five daily right knee pain is stable.  Denies depression worsening symptoms, stable on Cymbalta, no suicidal ideation.  No mood changes.  Good social support with wife.  No regular alcohol  Insomnia on ambien 10 mg nightly, no daytime drowsiness or hangover effect  Dyslipidemia on Zocor no muscle pain or muscle ache  Tries to limit sweets and candies  No falls, no difficulty with balance  No tobacco, no illicit drugs         Current Outpatient Prescriptions   Medication Sig Dispense Refill   • zolpidem (AMBIEN) 10 MG Tab Take 1 Tab by mouth at bedtime as needed for Sleep for up to 9 days. 90 Tab 1   • triamterene/hctz (MAXZIDE-25/DYAZIDE) 37.5-25 MG Cap Take 1 Cap by mouth every day. Needs to be seen for further refills. Thank you 90 Cap 0   • simvastatin (ZOCOR) 20 MG Tab Take 1 Tab by mouth every evening. Needs to be seen for further refills. Thank you 90 Tab 0   • amLODIPine (NORVASC) 10 MG Tab Take 1 Tab by mouth every day. Needs to be seen for further refills. Thank you 90 Tab 0   • losartan (COZAAR) 100 MG Tab Take 1 Tab by mouth every day. Needs to be seen for further refills. Thank you 90 Tab 0   • potassium chloride SA (KDUR) 20 MEQ Tab CR Take 2 Tabs by mouth 2 times a day. 360 Tab 3   • duloxetine (CYMBALTA) 60 MG Cap DR Particles delayed-release capsule Take 1 Cap by mouth every day. 90 Cap 2   • naproxen (NAPROSYN) 500 MG Tab Take 1 Tab by mouth 2 times a day as needed (pain). 180 Tab 3   • Multiple  Vitamins-Minerals (MULTI COMPLETE PO) Take  by mouth.     • diphenhydrAMINE (BENADRYL) 25 MG Tab Take 25 mg by mouth every 6 hours as needed for Sleep.     • Oxycodone-Acetaminophen (PERCOCET-10)  MG Tab Take 1 Tab by mouth every four hours as needed for Moderate Pain. Ok to refill percocet 28-30 days after the percocet written on 5/24/16 is filled 180 Tab 0   • desonide (TRIDESILON) 0.05 % Cream Apply 1 Application to affected area(s) 1 time daily as needed (rash). 45 g 3   • aspirin (ASA) 81 MG Chew Tab chewable tablet Take 1 Tab by mouth every day. 100 Tab 11   • polyethylene glycol/lytes (MIRALAX) PACK Take 17 g by mouth every day.     • docusate sodium (COLACE) 100 MG CAPS Take 100 mg by mouth every day.     • vitamin D (CHOLECALCIFEROL) 1000 UNIT TABS Take 6,000 Units by mouth every day.       No current facility-administered medications for this visit.         Status post lumbar surgery  2/01 MRI lumbar spine DJD L5-S1 anterolisthesis 4-5 mm marked stenosis saw  neurosurgery  9/09  neurosurgery note  12/09 neurosurgery note, severe left-sided foraminal stenosis, L5-S1 spondylolisthesis 4-5 mm recommend surgery, patient declined, has had epidural with no benefit,tried lyrica and neurontin before, declines cymbalta trial  4/10 note dr. johnson neurosurgery who gives patient norco, he is retiring, and has offered patient surgical therapy versus continuing norco 10 mg which we'll assume dispensing.  10/11 MRI lumbar spine grade 1 spondylolisthesis 9mm, bilateral spondylosis L5, moderate bilateral L5 stenosis, 2.4 cm left kidney cyst  10/11 xray LS, grade 1 spondylolisthesis L5 on S1 increased from prior exam, 1.3 cm, no significant change in flexion  11/11  pain note right L5-S1 transforaminal epidural  1/16/12 start cymbalta trial (3/12 stop cymbalta no benefit)  1/12 dr. murphy neurosurgery note surgical intervention offered L4 to sacrum decompression  3/26/12 restarted  cymbalta    9/26/12  neurosurgery operative note decompression at L5-S1and bilateral foraminotomies,transforaminal lumbar interbody fusion, L4-L5 and L5-S1, with expandable cage 8-12 mm.  2/20/13 dr. murphy neurosurgery note, lumbar films, EMG NCS lower ext inconclusive, repeat MRI lumbar spine pending  3/13/13 MRI lumbar spine postoperative changes L4-S1 lumbar laminectomy, interbody fusion, disc space insert L4-L5 L5-S1, posterior spinal fusion and fixation, L5-S1 moderate right foraminal stenosis. 2.4 cm mid right renal cyst unchanged  3/26/13 dr. murphy neurosurgery note, no further surgical intervention necessary, chronic narcotics norco 10 mg 5 per day; I will assume the norco rx from   1/25/15 off naprosyn and cymbalta; on norco 10 mg one every four hours #150  5/21/15 on percocet 10 mg five per day and on cymbalta 60 mg  3/25/16 start lyrica 50 mg tid for fibromyalgia, continue percocet 10 mg 5 per day and cymbalta 60 mg  4/12/16 increase lyrica to 100 mg tid (50 mg two tid), continue cymbalta 60 mg and percocet 10 mg 5 per day  9/26/16 off lyrica, on percocet 10 mg qid per  pain management and cymbalta     Speech language deficit  1999 affected right arm, no old records  Some diff with short recall, and occasional diff with expressing self when fatigued     Status post knee replacement left 2001     Status post hip replacement bilateral  12/03 right total hip replacement  1/04 left total hip replacement      Preventative health  4/6/07 colonoscopy [facility] negative  9/17/09 pneumovax  10/26/10 zoster vaccine  8/5/11 td  8/27/15 [facility]  7/11/17 psa 1.3  7/11/17 vit d 52  9/29/17 declines colonoscopy,FIT card provided  11/16/17 FIT negative  4/17/18 shingrix rx printed     Paroxysmal atrial fibrillation  Sees [facility]  2005 s/p maze procedure with a stroke related to that as a complication, have no records at all regarding that, no CT scan or MRI scan and probably had a  cardiac catheterization by renal physicians in 2005 that was negative for  Coronary artery disease.  8/11 [facility] note EKG NSR  5/12 [facility] note, on asa daily  11/12 [facility] note  4/29/13 cardiology note  11/3/13 cardiology note on asa  9/22/14 cardiology note sinus, follow up 6 months  5/12/15  cardiology note, paroxysmal atrial fibrillation status post maze operation, sinus rhythm  11/18/15 cardiology note stable follow one year  4/4/17  cardiology note, paroxysmal atrial fibrillation no recurrence, follow-up one year     Insomnia  2009 on ambien 10 mg  2/19/16 unable to taper ambien, referral to behavioral sleep psychology recommended he declines     Hypertension  7/10 [facility] note change from hyzaar to enalapril hct 10/25 1/11 [facility] note bp not controlled on enalapril hct 10/25, change back to losartan hct 100/25 and norvasc 10 mg  6/12 K+ 3.0, increase kdur to 20 tid, consider decreasing hctz if possible to decrease K+ supplementation  12/12 cozaar 100 mg, dyazide 37.5/25 mg, kcl 20 meq, norvasc 10 mg  1/29/14 on cozaar 100 mg, dyazide 37.5/25 mg, norvasc 10 mg, klor 20 meq bid; will increase to klor 20 meq tid  9/3//14 K+ 3.1 on klor 20 meq tid, increase to 20 meq two tab bid  9/3/14 urine mac <0.5 on cozaar 100 mg, dyazide 37.5/25 mg, norvasc 10 mg, klor 20 meq two tab bid  6/30/15 Na 132,K+ 3.5 on cozaar 100 mg, dyazide 37.5/25 mg, norvasc 10 mg, klor 20 meq two tab bid  8/31/16 K+ 3.3 on cozaar 100 mg, dyazide 37.5/25 mg, norvasc 10 mg, klor 20 meq two tab qday will increase to 2 bid  7/11/17 K+3.3 on cozaar 100 mg, dyazide 37.5/25 mg, norvasc 10 mg, klor 20 meq two bid     History stroke  1999 affected right arm, no old records  Some difficulty with short recall, and occasional diff with expressing self when fatigued    History squamous cell carcinoma  Saw  dermatology offered aldara he declined  3/27/17  dermatology note  5/1/17  dermatology biopsy proven squamous cell  carcinoma in situ left forehead, here for mohs  9/19/17  dermatology note continue      Fibromyalgia  3/25/16 start lyrica 50 mg tid for fibromyalgia, continue percocet 10 mg 5 per day and cymbalta 60 mg  4/12/16 increase lyrica to 100 mg tid (50 mg two tid), continue cymbalta 60 mg and percocet 10 mg 5 per day  4/28/16 increase lyrica to 150 mg tid, continue cymbalta 60 mg and percocet 10 mg five times per day  5/10/16 on lyrica 50 mg three tabs tid, change to 100 mg am, 150 mg noon, 100 mg pm     Dyslipidemia  Followed by cardiology  2/09 cholesterol 135, triglycerides 100, HDL 47, LDL 68  8/09 chol 138,trig 101,hdl 49,ldl 69  3/10 chol 123,trig 66,hdl 49,ldl 61  7/10 [facility] note change vytorin to zocor 40  11/10 chol 141,trig 75,hdl 62,ldl 64 on vytorin 10/20 mg per [facility]  10/11 chol 159,trig 102,hdl 52,ldl 87 on vytorin 10/20 per [facility]  2/29/12 stop vytorin, change to zocor 20 mg per [facility]  6/12 chol 152,trig 102,hdl 49,ldl 83 on zocor 20 mg  6/13 chol 150,trig 130,hdl 52,ldl 72 on zocor 20 mg  1/29/14 chol 147,trig 127,hdl 50,ldl 72 on zocor 20 mg  9/3/14 chol 169,trig 228,hdl 49,ldl 74 on zocor 20 mg  6/30/15 chol 151,trig 88,hdl 63,ldl 70 on zocor 20 mg  8/31/16 chol 169,trig 130,hdl 59,ldl 84 on zocor 20 mg  7/11/17 chol 147,trig 146,hdl 51,ldl 67 on zocor 20 mg     Depression  3/12 tried cymalta for pain no benefit  12/6/13 PHQ 9 score 15, start cymbalta samples 30 mg x 7 days, then 60 mg  12/20/13 cymbalta 60 mg  1/22/15 off cymbalta    5/21/15 on cymbalta 60 mg  2/19/16 referral to behavioral health recommended he declines  11/29/16 depression screening score 0, continues on cymbalta  1/30/18 depression screening score 0 on cymbalta     Chronic opiate  3/26/13 dr. murphy neurosurgery note, no further surgical intervention necessary, chronic narcotics norco 10 mg 5 per day; I will assume the norco rx from   12/6/13 change from hydrocodone 10 mg 4-5 tablets per day to oxycodone 10 mg 4-5 tabs per  day  1/22/15 UDT millenium consistent  2/24/15 change from hydrocodone 10 mg to oxycodone 10 mg (percocet) qid #150 per 30 days  1/22/14 norco 10 mg #180, millenium URT done  2/24/15 change to percocet 10 mg #150 only one prescription provided, if successful for pain relief call us and I will write next 3 refills that he can  and then followup in office after that  5/21/15 percocet 10 mg #150 refill  5/21/15 narcotic contract  5/21/15 opiate risk score 1  8/24/15 refill percocet #150 x 3  8/24/15 referral pain management  10/4/15   2/19/16   2/19/16 increase frequency to percocet 10 mg q 4 hours #180 per month, declined trial of MS contin, because of increasing utilization, referral made to pain management   5/24/16   5/24/16 UDT millennium  5/24/16 refill percocet #150 x 2 refer to  pain management to assume opiate prescription writing   6/22/16  pain management note, initiate pregabalin, work on taper oxycodone  7/6/16  continue oxycodone and pregabalin  8/9/16  pain management note continue oxycodone, consider ultrasound-guided knee injections to right  9/12/16  pain management note, UDT consistent     Cervical pain  3/06 MRI cervical spine moderate subdural frontal stenosis C5-C6 lesser extent C6-C7     Arthritis knee  9/3/14 x-ray right knee marked tricompartmental osteoarthritis, most severe medial compartment  2/19/16 has seen  orthopedics, declines knee replacement               Patient Active Problem List   Diagnosis   • S/P hip replacement   • Status post lumbar surgery   • Cervical pain   • Hypertension   • Paroxysmal atrial fibrillation (HCC)   • Dyslipidemia   • History of squamous cell carcinoma   • Preventative health care   • S/P knee replacement   • Obesity   • Plantar fasciitis   • History of stroke   • Insomnia   • Depression, major, in partial remission (HCC)   • Chronic pain   • Arthritis of knee, right   • Opiate  "dependence, continuous (CMS-HCC)   • Speech and language deficit, late effect of cerebrovascular disease   • Fibromyalgia          Health Maintenance Summary                IMM DTaP/Tdap/Td Vaccine Overdue 8/6/2011      Done 8/5/2011 Imm Admin: TD Vaccine    IMM PNEUMOCOCCAL 65+ (ADULT) LOW/MEDIUM RISK SERIES Overdue 8/27/2016      Done 8/27/2015 Imm Admin: Pneumococcal Conjugate Vaccine (Prevnar/PCV-13)     Patient has more history with this topic...    COLONOSCOPY Overdue 4/6/2017      Done 4/6/2007 REFERRAL TO GI FOR COLONOSCOPY     Patient has more history with this topic...    Annual Wellness Visit Overdue 11/30/2017      Done 11/29/2016 Visit Dx: Medicare annual wellness visit, subsequent     Patient has more history with this topic...    IMM INFLUENZA Next Due 9/1/2018      Done 9/29/2017 Imm Admin: Influenza Vaccine Adult HD     Patient has more history with this topic...          Patient Care Team:  Norman Peterson M.D. as PCP - General  Jose Martin Campbell M.D. as Consulting Physician (Anesthesia)  Chris Beltran M.D. as Consulting Physician (Neurosurgery)  Irineo Skinner M.D. as Consulting Physician (Cardiac Electrophysiology)  Pete Rangel O.D. as Consulting Physician (Optometry)      ROS       Objective:     /80   Pulse 100   Temp 36.8 °C (98.2 °F)   Resp 16   Ht 1.778 m (5' 10\")   Wt (!) 128.4 kg (283 lb)   SpO2 91%   BMI 40.61 kg/m²      Physical Exam   Constitutional: He appears well-developed and well-nourished. No distress.   Cardiovascular: Normal rate, regular rhythm and normal heart sounds.    Skin: He is not diaphoretic.   Nursing note and vitals reviewed.              Assessment/Plan:     Assessment  #! 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[place], as she nutritionist previously    #2 Paroxysmal atrial fibrillation followed by cardiology, on aspirin daily, sinus " rhythm on exam 4/4/17  cardiology note, paroxysmal atrial fibrillation no recurrence, follow-up one year      #3 Hypertension stable blood pressure on cozaar 100 mg, dyazide 37.5/25 mg, norvasc 10 mg, klor 20 meq two bid     #4 History stroke 1999 affected right arm, no old records Some difficulty with short recall, and occasional diff with expressing self when fatigued    #5 Fibromyalgia no current medication stable     #6 Dyslipidemia 7/11/17 chol 147,trig 146,hdl 51,ldl 67 on zocor 20 mg no muscle pain or statin therapy     #7 Depression stable on Cymbalta     #8 Chronic opiate followed by  pain management note no side effects with opiate therapy      #9 Insomnia unable to taper ambien, referral to behavioral sleep psychology recommended he declines denies daytime drowsiness, sedation, Hwang loss, confusion     #10 Status post lumbar surgery 9/26/12  neurosurgery operative note decompression at L5-S1and bilateral foraminotomies,transforaminal lumbar interbody fusion, L4-L5 and L5-S1, with expandable cage 8-12 mm.    #11 Status post knee replacement left 2001, chronic right knee pain on opiate therapy     #12 Status post hip replacement bilateral    #13 hypovitamin d    #14 impaired fasting glucose metabolism         Plan  #! Declines weight management program understands obesity increases risk for diabetes, cardiac disease, worsening arthritis    #2 declines nutritionist consultation or physician managed weight loss program for weight loss and morbid obesity    #3 declines sleep hygiene referral to sleep psychologist, understands that ambien increases risk for side effects, sleep hygiene discussed and emphasized, no alcohol, no caffeine, exercise, meditation, relaxation therapy     #4 recommend ambien taper due to increase risk of dementia or depression     #5 labs     #6 follow up with cardiology    #7 check blood pressure and record    #8 exercise 30 minutes daily    #9 follow-up with  pain management understands long-term risk of opiate therapy followed by pain management    #10 continue Cymbalta monitor for mood changes    #11 follow-up 6 months

## 2018-11-07 ENCOUNTER — HOSPITAL ENCOUNTER (OUTPATIENT)
Dept: LAB | Facility: MEDICAL CENTER | Age: 73
End: 2018-11-07
Attending: INTERNAL MEDICINE
Payer: MEDICARE

## 2018-11-07 DIAGNOSIS — R73.09 IMPAIRED GLUCOSE METABOLISM: ICD-10-CM

## 2018-11-07 DIAGNOSIS — E55.9 HYPOVITAMINOSIS D: ICD-10-CM

## 2018-11-07 DIAGNOSIS — Z12.5 PROSTATE CANCER SCREENING: ICD-10-CM

## 2018-11-07 DIAGNOSIS — E78.5 DYSLIPIDEMIA: ICD-10-CM

## 2018-11-07 LAB
25(OH)D3 SERPL-MCNC: 46 NG/ML (ref 30–100)
APPEARANCE UR: CLEAR
BACTERIA #/AREA URNS HPF: NEGATIVE /HPF
BASOPHILS # BLD AUTO: 0.5 % (ref 0–1.8)
BASOPHILS # BLD: 0.05 K/UL (ref 0–0.12)
BILIRUB UR QL STRIP.AUTO: NEGATIVE
COLOR UR: YELLOW
EOSINOPHIL # BLD AUTO: 0.15 K/UL (ref 0–0.51)
EOSINOPHIL NFR BLD: 1.4 % (ref 0–6.9)
EPI CELLS #/AREA URNS HPF: NEGATIVE /HPF
ERYTHROCYTE [DISTWIDTH] IN BLOOD BY AUTOMATED COUNT: 42.7 FL (ref 35.9–50)
EST. AVERAGE GLUCOSE BLD GHB EST-MCNC: 126 MG/DL
GLUCOSE UR STRIP.AUTO-MCNC: NEGATIVE MG/DL
HBA1C MFR BLD: 6 % (ref 0–5.6)
HCT VFR BLD AUTO: 47.7 % (ref 42–52)
HGB BLD-MCNC: 16.7 G/DL (ref 14–18)
HYALINE CASTS #/AREA URNS LPF: ABNORMAL /LPF
IMM GRANULOCYTES # BLD AUTO: 0.04 K/UL (ref 0–0.11)
IMM GRANULOCYTES NFR BLD AUTO: 0.4 % (ref 0–0.9)
KETONES UR STRIP.AUTO-MCNC: NEGATIVE MG/DL
LEUKOCYTE ESTERASE UR QL STRIP.AUTO: NEGATIVE
LYMPHOCYTES # BLD AUTO: 2.26 K/UL (ref 1–4.8)
LYMPHOCYTES NFR BLD: 21.3 % (ref 22–41)
MCH RBC QN AUTO: 32.3 PG (ref 27–33)
MCHC RBC AUTO-ENTMCNC: 35 G/DL (ref 33.7–35.3)
MCV RBC AUTO: 92.3 FL (ref 81.4–97.8)
MICRO URNS: ABNORMAL
MONOCYTES # BLD AUTO: 0.73 K/UL (ref 0–0.85)
MONOCYTES NFR BLD AUTO: 6.9 % (ref 0–13.4)
NEUTROPHILS # BLD AUTO: 7.38 K/UL (ref 1.82–7.42)
NEUTROPHILS NFR BLD: 69.5 % (ref 44–72)
NITRITE UR QL STRIP.AUTO: NEGATIVE
NRBC # BLD AUTO: 0 K/UL
NRBC BLD-RTO: 0 /100 WBC
PH UR STRIP.AUTO: 7 [PH]
PLATELET # BLD AUTO: 247 K/UL (ref 164–446)
PMV BLD AUTO: 9.3 FL (ref 9–12.9)
PROT UR QL STRIP: 30 MG/DL
PSA SERPL-MCNC: 1.43 NG/ML (ref 0–4)
RBC # BLD AUTO: 5.17 M/UL (ref 4.7–6.1)
RBC # URNS HPF: ABNORMAL /HPF
RBC UR QL AUTO: NEGATIVE
SP GR UR STRIP.AUTO: 1.02
TSH SERPL DL<=0.005 MIU/L-ACNC: 3.63 UIU/ML (ref 0.38–5.33)
UROBILINOGEN UR STRIP.AUTO-MCNC: 0.2 MG/DL
WBC # BLD AUTO: 10.6 K/UL (ref 4.8–10.8)
WBC #/AREA URNS HPF: ABNORMAL /HPF

## 2018-11-07 PROCEDURE — 83036 HEMOGLOBIN GLYCOSYLATED A1C: CPT

## 2018-11-07 PROCEDURE — 84443 ASSAY THYROID STIM HORMONE: CPT

## 2018-11-07 PROCEDURE — 82306 VITAMIN D 25 HYDROXY: CPT

## 2018-11-07 PROCEDURE — 80053 COMPREHEN METABOLIC PANEL: CPT

## 2018-11-07 PROCEDURE — 81001 URINALYSIS AUTO W/SCOPE: CPT

## 2018-11-07 PROCEDURE — 85025 COMPLETE CBC W/AUTO DIFF WBC: CPT

## 2018-11-07 PROCEDURE — 36415 COLL VENOUS BLD VENIPUNCTURE: CPT

## 2018-11-07 PROCEDURE — 80061 LIPID PANEL: CPT

## 2018-11-07 PROCEDURE — 84153 ASSAY OF PSA TOTAL: CPT

## 2018-11-08 ENCOUNTER — TELEPHONE (OUTPATIENT)
Dept: MEDICAL GROUP | Facility: MEDICAL CENTER | Age: 73
End: 2018-11-08

## 2018-11-08 DIAGNOSIS — R31.29 MICROSCOPIC HEMATURIA: ICD-10-CM

## 2018-11-08 PROBLEM — R73.09 IMPAIRED GLUCOSE METABOLISM: Status: ACTIVE | Noted: 2018-11-08

## 2018-11-08 LAB
ALBUMIN SERPL BCP-MCNC: 4.6 G/DL (ref 3.2–4.9)
ALBUMIN/GLOB SERPL: 1.6 G/DL
ALP SERPL-CCNC: 69 U/L (ref 30–99)
ALT SERPL-CCNC: 16 U/L (ref 2–50)
ANION GAP SERPL CALC-SCNC: 9 MMOL/L (ref 0–11.9)
AST SERPL-CCNC: 20 U/L (ref 12–45)
BILIRUB SERPL-MCNC: 0.7 MG/DL (ref 0.1–1.5)
BUN SERPL-MCNC: 15 MG/DL (ref 8–22)
CALCIUM SERPL-MCNC: 9.8 MG/DL (ref 8.5–10.5)
CHLORIDE SERPL-SCNC: 102 MMOL/L (ref 96–112)
CHOLEST SERPL-MCNC: 189 MG/DL (ref 100–199)
CO2 SERPL-SCNC: 26 MMOL/L (ref 20–33)
CREAT SERPL-MCNC: 0.7 MG/DL (ref 0.5–1.4)
FASTING STATUS PATIENT QL REPORTED: NORMAL
GLOBULIN SER CALC-MCNC: 2.9 G/DL (ref 1.9–3.5)
GLUCOSE SERPL-MCNC: 112 MG/DL (ref 65–99)
HDLC SERPL-MCNC: 54 MG/DL
LDLC SERPL CALC-MCNC: 92 MG/DL
POTASSIUM SERPL-SCNC: 3.4 MMOL/L (ref 3.6–5.5)
PROT SERPL-MCNC: 7.5 G/DL (ref 6–8.2)
SODIUM SERPL-SCNC: 137 MMOL/L (ref 135–145)
TRIGL SERPL-MCNC: 215 MG/DL (ref 0–149)

## 2018-11-09 NOTE — TELEPHONE ENCOUNTER
Notified wife with labs, potassium slightly low continue potassium supplementation, microscopic hematuria, asymptomatic we will repeat urinalysis 4 weeks, order placed in computer system  A1c elevated, no medications necessary, unable to optimize exercise because of chronic pain, will try to enhance nutrition program, limiting sweets, candies, carbohydrate portion serving size, understands prediabetes increases risk for diabetes  Repeat A1c 3 months

## 2018-12-18 ENCOUNTER — TELEPHONE (OUTPATIENT)
Dept: MEDICAL GROUP | Facility: MEDICAL CENTER | Age: 73
End: 2018-12-18

## 2018-12-18 ENCOUNTER — HOSPITAL ENCOUNTER (OUTPATIENT)
Dept: LAB | Facility: MEDICAL CENTER | Age: 73
End: 2018-12-18
Attending: INTERNAL MEDICINE
Payer: MEDICARE

## 2018-12-18 DIAGNOSIS — R31.29 MICROSCOPIC HEMATURIA: ICD-10-CM

## 2018-12-18 LAB
APPEARANCE UR: CLEAR
BILIRUB UR QL STRIP.AUTO: NEGATIVE
COLOR UR: YELLOW
GLUCOSE UR STRIP.AUTO-MCNC: NEGATIVE MG/DL
KETONES UR STRIP.AUTO-MCNC: NEGATIVE MG/DL
LEUKOCYTE ESTERASE UR QL STRIP.AUTO: NEGATIVE
MICRO URNS: NORMAL
NITRITE UR QL STRIP.AUTO: NEGATIVE
PH UR STRIP.AUTO: 7.5 [PH]
PROT UR QL STRIP: NEGATIVE MG/DL
RBC UR QL AUTO: NEGATIVE
SP GR UR STRIP.AUTO: 1.01
UROBILINOGEN UR STRIP.AUTO-MCNC: 0.2 MG/DL

## 2018-12-18 PROCEDURE — 81003 URINALYSIS AUTO W/O SCOPE: CPT

## 2018-12-18 NOTE — TELEPHONE ENCOUNTER
Please notify patient that his repeat urinalysis is negative for blood, no further follow-up is necessary.

## 2018-12-19 ENCOUNTER — TELEPHONE (OUTPATIENT)
Dept: MEDICAL GROUP | Facility: MEDICAL CENTER | Age: 73
End: 2018-12-19

## 2018-12-19 NOTE — TELEPHONE ENCOUNTER
----- Message from Norman Peterson M.D. sent at 12/18/2018  2:04 PM PST -----  Please notify patient that his repeat urinalysis is negative for blood, no further follow-up is necessary.

## 2018-12-19 NOTE — TELEPHONE ENCOUNTER
Phone Number Called: [phone] (home)       Message: Patient notified of results.       Left Message for patient to call back: N\A

## 2018-12-31 ENCOUNTER — HOSPITAL ENCOUNTER (OUTPATIENT)
Facility: MEDICAL CENTER | Age: 73
End: 2018-12-31
Payer: MEDICARE

## 2018-12-31 PROCEDURE — 82274 ASSAY TEST FOR BLOOD FECAL: CPT

## 2019-01-04 ENCOUNTER — TELEPHONE (OUTPATIENT)
Dept: MEDICAL GROUP | Facility: MEDICAL CENTER | Age: 74
End: 2019-01-04

## 2019-01-04 LAB — HEMOCCULT STL QL IA: NEGATIVE

## 2019-01-07 ENCOUNTER — TELEPHONE (OUTPATIENT)
Dept: MEDICAL GROUP | Facility: MEDICAL CENTER | Age: 74
End: 2019-01-07

## 2019-01-07 NOTE — TELEPHONE ENCOUNTER
----- Message from Norman Peterson M.D. sent at 1/4/2019  6:49 PM PST -----  Please notify patient that his stool test is negative for blood

## 2019-01-07 NOTE — TELEPHONE ENCOUNTER
Phone Number Called: [phone] (home)       Message: Patient notified of results.       Left Message for patient to call back: N\A

## 2019-01-08 ENCOUNTER — OFFICE VISIT (OUTPATIENT)
Dept: MEDICAL GROUP | Facility: MEDICAL CENTER | Age: 74
End: 2019-01-08
Payer: MEDICARE

## 2019-01-08 VITALS
OXYGEN SATURATION: 97 % | DIASTOLIC BLOOD PRESSURE: 88 MMHG | HEIGHT: 70 IN | TEMPERATURE: 97.4 F | WEIGHT: 273 LBS | SYSTOLIC BLOOD PRESSURE: 138 MMHG | BODY MASS INDEX: 39.08 KG/M2 | HEART RATE: 115 BPM

## 2019-01-08 DIAGNOSIS — F33.42 RECURRENT MAJOR DEPRESSIVE DISORDER, IN FULL REMISSION (HCC): ICD-10-CM

## 2019-01-08 DIAGNOSIS — I48.0 PAROXYSMAL ATRIAL FIBRILLATION (HCC): Chronic | ICD-10-CM

## 2019-01-08 DIAGNOSIS — Z00.00 PREVENTATIVE HEALTH CARE: Chronic | ICD-10-CM

## 2019-01-08 DIAGNOSIS — Z23 NEED FOR ZOSTER VACCINE: ICD-10-CM

## 2019-01-08 DIAGNOSIS — F11.20 UNCOMPLICATED OPIOID DEPENDENCE (HCC): ICD-10-CM

## 2019-01-08 DIAGNOSIS — F11.20 OPIATE DEPENDENCE, CONTINUOUS (HCC): Chronic | ICD-10-CM

## 2019-01-08 DIAGNOSIS — E66.01 CLASS 3 SEVERE OBESITY DUE TO EXCESS CALORIES WITH SERIOUS COMORBIDITY AND BODY MASS INDEX (BMI) OF 40.0 TO 44.9 IN ADULT (HCC): ICD-10-CM

## 2019-01-08 DIAGNOSIS — F33.41 RECURRENT MAJOR DEPRESSIVE DISORDER, IN PARTIAL REMISSION (HCC): Chronic | ICD-10-CM

## 2019-01-08 DIAGNOSIS — Z23 NEEDS FLU SHOT: ICD-10-CM

## 2019-01-08 PROCEDURE — 90662 IIV NO PRSV INCREASED AG IM: CPT | Performed by: INTERNAL MEDICINE

## 2019-01-08 PROCEDURE — 99213 OFFICE O/P EST LOW 20 MIN: CPT | Performed by: INTERNAL MEDICINE

## 2019-01-08 PROCEDURE — G0008 ADMIN INFLUENZA VIRUS VAC: HCPCS | Performed by: INTERNAL MEDICINE

## 2019-01-08 NOTE — PROGRESS NOTES
Subjective:      Julio Gonzalez is a 73 y.o. male who presents follow up blood pressure          HPI     Follow up blood pressure on triamterene hydrochlorothiazide losartan 100 mg and Norvasc, not checking blood pressure on a regular basis but goes to pain management monthly, at those visits her blood pressures are stable.  Patient has lost 10 pounds, trying to optimize his diet, decreasing calories, limiting sweets.  No regular exercise.  Chronic back, knee pain followed by pain management remains on oxycodone.  Pain has been stable.  Denies depression or mood changes with the holidays.  Good social support with wife.  No alcohol or illicit drugs.  No falls.  No cane or walker for ambulation.  Chronic insomnia on Ambien unable to taper no daytime drowsiness or hangover effect.  Mood has been stable on Cymbalta, patient has had no recurrence of symptoms on medication.  Good social support with wife.  No alcohol.  Paroxysmal atrial fibrillation followed by cardiology no recurrent symptoms of chest pain, palpitations      Current Outpatient Prescriptions   Medication Sig Dispense Refill   • zolpidem (AMBIEN) 10 MG Tab Take 1 Tab by mouth at bedtime as needed for Sleep for up to 30 days. 90 Each 1   • naproxen (NAPROSYN) 500 MG Tab Take 1 Tab by mouth 2 times a day as needed (pain). 180 Tab 3   • amLODIPine (NORVASC) 10 MG Tab TAKE ONE TABLET BY MOUTH DAILY - NEEDS TO BE SEEN FOR FURTHER REFILLS. 90 Tab 0   • triamterene/hctz (MAXZIDE-25/DYAZIDE) 37.5-25 MG Cap TAKE ONE CAPSULE BY MOUTH DAILY - NEEDS TO BE SEEN FOR FURTHER REFILLS. 90 Cap 0   • simvastatin (ZOCOR) 20 MG Tab TAKE ONE TABLET BY MOUTH EVERY EVENING (NEED TO BE SEEN FOR FURTHER REFILLS) 90 Tab 0   • losartan (COZAAR) 100 MG Tab Take 1 Tab by mouth every day. Needs to be seen for further refills. Thank you 90 Tab 0   • DULoxetine (CYMBALTA) 60 MG Cap DR Particles delayed-release capsule Take 1 Cap by mouth every day. 90 Cap 3   • potassium chloride  SA (KDUR) 20 MEQ Tab CR Take 2 Tabs by mouth 2 times a day. 360 Tab 3   • Multiple Vitamins-Minerals (MULTI COMPLETE PO) Take  by mouth.     • diphenhydrAMINE (BENADRYL) 25 MG Tab Take 25 mg by mouth every 6 hours as needed for Sleep.     • Oxycodone-Acetaminophen (PERCOCET-10)  MG Tab Take 1 Tab by mouth every four hours as needed for Moderate Pain. Ok to refill percocet 28-30 days after the percocet written on 5/24/16 is filled 180 Tab 0   • desonide (TRIDESILON) 0.05 % Cream Apply 1 Application to affected area(s) 1 time daily as needed (rash). 45 g 3   • aspirin (ASA) 81 MG Chew Tab chewable tablet Take 1 Tab by mouth every day. 100 Tab 11   • polyethylene glycol/lytes (MIRALAX) PACK Take 17 g by mouth every day.     • docusate sodium (COLACE) 100 MG CAPS Take 100 mg by mouth every day.     • vitamin D (CHOLECALCIFEROL) 1000 UNIT TABS Take 6,000 Units by mouth every day.       No current facility-administered medications for this visit.         Status post lumbar surgery  2/01 MRI lumbar spine DJD L5-S1 anterolisthesis 4-5 mm marked stenosis saw  neurosurgery  9/09  neurosurgery note  12/09 neurosurgery note, severe left-sided foraminal stenosis, L5-S1 spondylolisthesis 4-5 mm recommend surgery, patient declined, has had epidural with no benefit,tried lyrica and neurontin before, declines cymbalta trial  4/10 note dr. johnson neurosurgery who gives patient norco, he is retiring, and has offered patient surgical therapy versus continuing norco 10 mg which we'll assume dispensing.  10/11 MRI lumbar spine grade 1 spondylolisthesis 9mm, bilateral spondylosis L5, moderate bilateral L5 stenosis, 2.4 cm left kidney cyst  10/11 xray LS, grade 1 spondylolisthesis L5 on S1 increased from prior exam, 1.3 cm, no significant change in flexion  11/11  pain note right L5-S1 transforaminal epidural  1/16/12 start cymbalta trial (3/12 stop cymbalta no benefit)  1/12 dr. murphy neurosurgery note  surgical intervention offered L4 to sacrum decompression  3/26/12 restarted cymbalta    9/26/12  neurosurgery operative note decompression at L5-S1and bilateral foraminotomies,transforaminal lumbar interbody fusion, L4-L5 and L5-S1, with expandable cage 8-12 mm.  2/20/13 dr. murphy neurosurgery note, lumbar films, EMG NCS lower ext inconclusive, repeat MRI lumbar spine pending  3/13/13 MRI lumbar spine postoperative changes L4-S1 lumbar laminectomy, interbody fusion, disc space insert L4-L5 L5-S1, posterior spinal fusion and fixation, L5-S1 moderate right foraminal stenosis. 2.4 cm mid right renal cyst unchanged  3/26/13 dr. murphy neurosurgery note, no further surgical intervention necessary, chronic narcotics norco 10 mg 5 per day; I will assume the norco rx from   1/25/15 off naprosyn and cymbalta; on norco 10 mg one every four hours #150  5/21/15 on percocet 10 mg five per day and on cymbalta 60 mg  3/25/16 start lyrica 50 mg tid for fibromyalgia, continue percocet 10 mg 5 per day and cymbalta 60 mg  4/12/16 increase lyrica to 100 mg tid (50 mg two tid), continue cymbalta 60 mg and percocet 10 mg 5 per day  9/26/16 off lyrica, on percocet 10 mg qid per  pain management and cymbalta     Speech language deficit  1999 affected right arm, no old records  Some diff with short recall, and occasional diff with expressing self when fatigued     Status post knee replacement left 2001     Status post hip replacement bilateral  12/03 right total hip replacement  1/04 left total hip replacement      Preventative health  4/6/07 colonoscopy [facility] negative  9/17/09 pneumovax  10/26/10 zoster vaccine  8/5/11 td  8/27/15 prevnar at [facility]  9/29/17 declines colonoscopy,FIT card provided  4/17/18 shingrix rx printed  11/8/18 vit d 46  11/8/18 psa 1.4  1/4/19 FIT negative     Paroxysmal atrial fibrillation  Sees [facility]  2005 s/p maze procedure with a stroke related to that as a complication, have no  records at all regarding that, no CT scan or MRI scan and probably had a cardiac catheterization by renal physicians in 2005 that was negative for  Coronary artery disease.  8/11 [facility] note EKG NSR  5/12 [facility] note, on asa daily  11/12 [facility] note  4/29/13 cardiology note  11/3/13 cardiology note on asa  9/22/14 cardiology note sinus, follow up 6 months  5/12/15  cardiology note, paroxysmal atrial fibrillation status post maze operation, sinus rhythm  11/18/15 cardiology note stable follow one year  4/4/17  cardiology note, paroxysmal atrial fibrillation no recurrence, follow-up one year     Insomnia  2009 on ambien 10 mg  2/19/16 unable to taper ambien, referral to behavioral sleep psychology recommended he declines  9/26/16 on ambien 10 mg work on taper, declines referral to sleep psychology  7/9/18 on ambien declines sleep management referral    Impaired glucose metabolism  11/8/18 A1c 6%     Hypertension  7/10 [facility] note change from hyzaar to enalapril hct 10/25  1/11 [facility] note bp not controlled on enalapril hct 10/25, change back to losartan hct 100/25 and norvasc 10 mg  6/12 K+ 3.0, increase kdur to 20 tid, consider decreasing hctz if possible to decrease K+ supplementation  12/12 cozaar 100 mg, dyazide 37.5/25 mg, kcl 20 meq, norvasc 10 mg  1/29/14 on cozaar 100 mg, dyazide 37.5/25 mg, norvasc 10 mg, klor 20 meq bid; will increase to klor 20 meq tid  9/3//14 K+ 3.1 on klor 20 meq tid, increase to 20 meq two tab bid  9/3/14 urine mac <0.5 on cozaar 100 mg, dyazide 37.5/25 mg, norvasc 10 mg, klor 20 meq two tab bid  6/30/15 Na 132,K+ 3.5 on cozaar 100 mg, dyazide 37.5/25 mg, norvasc 10 mg, klor 20 meq two tab bid  8/31/16 K+ 3.3 on cozaar 100 mg, dyazide 37.5/25 mg, norvasc 10 mg, klor 20 meq two tab qday will increase to 2 bid  7/11/17 K+3.3 on cozaar 100 mg, dyazide 37.5/25 mg, norvasc 10 mg, klor 20 meq two bid     History stroke  1999 affected right arm, no old records  Some difficulty with short  recall, and occasional diff with expressing self when fatigued     History squamous cell carcinoma  Saw  dermatology offered aldara he declined  3/27/17  dermatology note  5/1/17  dermatology biopsy proven squamous cell carcinoma in situ left forehead, here for mohs  9/19/17  dermatology note continue      Fibromyalgia  3/25/16 start lyrica 50 mg tid for fibromyalgia, continue percocet 10 mg 5 per day and cymbalta 60 mg  4/12/16 increase lyrica to 100 mg tid (50 mg two tid), continue cymbalta 60 mg and percocet 10 mg 5 per day  4/28/16 increase lyrica to 150 mg tid, continue cymbalta 60 mg and percocet 10 mg five times per day  5/10/16 on lyrica 50 mg three tabs tid, change to 100 mg am, 150 mg noon, 100 mg pm     Dyslipidemia  Followed by cardiology  2/09 cholesterol 135, triglycerides 100, HDL 47, LDL 68  8/09 chol 138,trig 101,hdl 49,ldl 69  3/10 chol 123,trig 66,hdl 49,ldl 61  7/10 [facility] note change vytorin to zocor 40  11/10 chol 141,trig 75,hdl 62,ldl 64 on vytorin 10/20 mg per [facility]  10/11 chol 159,trig 102,hdl 52,ldl 87 on vytorin 10/20 per [facility]  2/29/12 stop vytorin, change to zocor 20 mg per [facility]  6/12 chol 152,trig 102,hdl 49,ldl 83 on zocor 20 mg  6/13 chol 150,trig 130,hdl 52,ldl 72 on zocor 20 mg  1/29/14 chol 147,trig 127,hdl 50,ldl 72 on zocor 20 mg  9/3/14 chol 169,trig 228,hdl 49,ldl 74 on zocor 20 mg  6/30/15 chol 151,trig 88,hdl 63,ldl 70 on zocor 20 mg  8/31/16 chol 169,trig 130,hdl 59,ldl 84 on zocor 20 mg  7/11/17 chol 147,trig 146,hdl 51,ldl 67 on zocor 20 mg  11/8/18 chol 189,trig 215,hdl 54,ldl 92 on zocor 20 mg     Depression  3/12 tried cymalta for pain no benefit  12/6/13 PHQ 9 score 15, start cymbalta samples 30 mg x 7 days, then 60 mg  12/20/13 cymbalta 60 mg  1/22/15 off cymbalta    5/21/15 on cymbalta 60 mg  2/19/16 referral to behavioral health recommended he declines  11/29/16 depression screening score 0, continues on cymbalta  1/30/18  depression screening score 0 on cymbalta     Chronic opiate  3/26/13 dr. murphy neurosurgery note, no further surgical intervention necessary, chronic narcotics norco 10 mg 5 per day; I will assume the norco rx from   12/6/13 change from hydrocodone 10 mg 4-5 tablets per day to oxycodone 10 mg 4-5 tabs per day  1/22/15 UDT millenium consistent  2/24/15 change from hydrocodone 10 mg to oxycodone 10 mg (percocet) qid #150 per 30 days  1/22/14 norco 10 mg #180, millenium URT done  2/24/15 change to percocet 10 mg #150 only one prescription provided, if successful for pain relief call us and I will write next 3 refills that he can  and then followup in office after that  5/21/15 percocet 10 mg #150 refill  5/21/15 narcotic contract  5/21/15 opiate risk score 1  8/24/15 refill percocet #150 x 3  8/24/15 referral pain management  10/4/15   2/19/16   2/19/16 increase frequency to percocet 10 mg q 4 hours #180 per month, declined trial of MS contin, because of increasing utilization, referral made to pain management   5/24/16   5/24/16 UDT millennium  5/24/16 refill percocet #150 x 2 refer to  pain management to assume opiate prescription writing   6/22/16  pain management note, initiate pregabalin, work on taper oxycodone  7/6/16  continue oxycodone and pregabalin  8/9/16  pain management note continue oxycodone, consider ultrasound-guided knee injections to right  9/12/16  pain management note, UDT consistent     Cervical pain  3/06 MRI cervical spine moderate subdural frontal stenosis C5-C6 lesser extent C6-C7     Arthritis knee  9/3/14 x-ray right knee marked tricompartmental osteoarthritis, most severe medial compartment  2/19/16 has seen  orthopedics, declines knee replacement          Patient Active Problem List   Diagnosis   • S/P hip replacement   • Status post lumbar surgery   • Cervical pain   • Hypertension   • Paroxysmal  "atrial fibrillation (HCC)   • Dyslipidemia   • History of squamous cell carcinoma   • Preventative health care   • S/P knee replacement   • Obesity   • Plantar fasciitis   • History of stroke   • Insomnia   • Depression, major, in partial remission (HCC)   • Chronic pain   • Arthritis of knee, right   • Opiate dependence, continuous (CMS-HCC)   • Speech and language deficit, late effect of cerebrovascular disease   • Fibromyalgia   • Impaired glucose metabolism          Health Maintenance Summary                IMM HEP B VACCINE Overdue 7/11/1965     IMM ZOSTER VACCINES Overdue 12/21/2010      Done 10/26/2010 Imm Admin: Zoster Vaccine Live (ZVL) (Zostavax)    IMM DTaP/Tdap/Td Vaccine Overdue 8/6/2011      Done 8/5/2011 Imm Admin: TD Vaccine    IMM PNEUMOCOCCAL 65+ (ADULT) LOW/MEDIUM RISK SERIES Overdue 8/27/2016      Done 8/27/2015 Imm Admin: Pneumococcal Conjugate Vaccine (Prevnar/PCV-13)     Patient has more history with this topic...    COLONOSCOPY Overdue 4/6/2017      Done 4/6/2007 REFERRAL TO GI FOR COLONOSCOPY     Patient has more history with this topic...    Annual Wellness Visit Overdue 11/30/2017      Done 11/29/2016 Visit Dx: Medicare annual wellness visit, subsequent     Patient has more history with this topic...    IMM INFLUENZA Overdue 9/1/2018      Done 9/29/2017 Imm Admin: Influenza Vaccine Adult HD     Patient has more history with this topic...          Patient Care Team:  Norman Peterson M.D. as PCP - General  Jose Martin Campbell M.D. as Consulting Physician (Anesthesia)  Chris Beltran M.D. as Consulting Physician (Neurosurgery)  Irineo Skinner M.D. as Consulting Physician (Cardiac Electrophysiology)  Pete Rangel O.D. as Consulting Physician (Optometry)    ROS       Objective:     /88 (BP Location: Right arm, Patient Position: Sitting, BP Cuff Size: Adult)   Pulse (!) 115   Temp 36.3 °C (97.4 °F) (Temporal)   Ht 1.778 m (5' 10\")   Wt 123.8 kg (273 lb)   SpO2 97%   BMI 39.17 kg/m²  "     Physical Exam   Constitutional: He appears well-developed and well-nourished. No distress.   HENT:   Head: Normocephalic and atraumatic.   Eyes: Conjunctivae are normal. Right eye exhibits no discharge. Left eye exhibits no discharge.   Neck: Neck supple.   Cardiovascular: Normal rate and regular rhythm.    Pulmonary/Chest: Effort normal and breath sounds normal.   Abdominal: He exhibits no distension.   Musculoskeletal: He exhibits no edema.   Neurological: He is alert.   Skin: He is not diaphoretic.   Psychiatric: He has a normal mood and affect. His behavior is normal.   Nursing note and vitals reviewed.              Assessment/Plan:     Assessment  #!  Hypertension slightly elevated today but blood pressures have been stable at pain management office, sees them monthly for pain medication    #2 chronic low back pain and hip and knee pain followed by pain management Dr. Mathias    #3 obesity BMI 39.1    #4 impaired glucose metabolism A1c 6%    #5 opioid dependence on chronic narcotic therapy per Dr. Mathias pain management    #6 major depressive disorder stable in remission on Cymbalta    #7 paroxysmal atrial fibrillation stable sinus rhythm on exam followed by cardiology      Plan  #! Nutrition and diet and exercise emphasized, obesity increases risk for diabetes and heart disease    #2 offered weight management referral physician managed program he declines, he will work on nutrition, activity on his own, recommend walking for exercise    #3 flu shot    #4 shingles vaccine provided to get at pharmacy    #5 follow-up 6 months    #6 check blood pressure regularly and record, follow-up cardiology    #7 continue Cymbalta, continue no alcohol    #8 follow-up with pain management

## 2019-02-05 ENCOUNTER — OFFICE VISIT (OUTPATIENT)
Dept: CARDIOLOGY | Facility: MEDICAL CENTER | Age: 74
End: 2019-02-05
Payer: MEDICARE

## 2019-02-05 VITALS
HEIGHT: 70 IN | WEIGHT: 274 LBS | SYSTOLIC BLOOD PRESSURE: 130 MMHG | BODY MASS INDEX: 39.22 KG/M2 | OXYGEN SATURATION: 92 % | HEART RATE: 74 BPM | DIASTOLIC BLOOD PRESSURE: 74 MMHG

## 2019-02-05 DIAGNOSIS — Z96.641 STATUS POST RIGHT HIP REPLACEMENT: ICD-10-CM

## 2019-02-05 DIAGNOSIS — I69.928 SPEECH AND LANGUAGE DEFICIT, LATE EFFECT OF CEREBROVASCULAR DISEASE: ICD-10-CM

## 2019-02-05 DIAGNOSIS — I48.0 PAF (PAROXYSMAL ATRIAL FIBRILLATION) (HCC): ICD-10-CM

## 2019-02-05 DIAGNOSIS — Z86.73 HISTORY OF STROKE: Chronic | ICD-10-CM

## 2019-02-05 DIAGNOSIS — I48.0 PAROXYSMAL ATRIAL FIBRILLATION (HCC): Chronic | ICD-10-CM

## 2019-02-05 DIAGNOSIS — E66.01 CLASS 3 SEVERE OBESITY DUE TO EXCESS CALORIES WITHOUT SERIOUS COMORBIDITY IN ADULT, UNSPECIFIED BMI (HCC): ICD-10-CM

## 2019-02-05 DIAGNOSIS — I10 ESSENTIAL HYPERTENSION: Chronic | ICD-10-CM

## 2019-02-05 DIAGNOSIS — Z86.79 S/P MAZE OPERATION FOR ATRIAL FIBRILLATION: ICD-10-CM

## 2019-02-05 DIAGNOSIS — Z98.890 S/P MAZE OPERATION FOR ATRIAL FIBRILLATION: ICD-10-CM

## 2019-02-05 LAB — EKG IMPRESSION: NORMAL

## 2019-02-05 PROCEDURE — 93000 ELECTROCARDIOGRAM COMPLETE: CPT | Performed by: INTERNAL MEDICINE

## 2019-02-05 PROCEDURE — 99214 OFFICE O/P EST MOD 30 MIN: CPT | Performed by: INTERNAL MEDICINE

## 2019-02-05 NOTE — LETTER
[facility]   [address], [address]  [place] [address]  Phone: [phone]  Fax: [phone]              Julio Gonzalez  1945    Encounter Date: 2/5/2019    Irineo Skinner M.D.          PROGRESS NOTE:  Chief Complaint   Patient presents with   • Atrial Fibrillation     F/V DX:PAF       Subjective:   Julio Gonzalez is a 73 y.o. male who presents today with obesity and previous history of atrial fibrillation status post maze.  Previous ablation.  Previous CVA.  Hypertension.  Moderate obesity.  Clinically stable denies any palpitations denies any chest pain or shortness of breath.  Systolic blood pressures less than 130.    Past Medical History:   Diagnosis Date   • Aphasia    • Arrhythmia     a-fib, ablation x 2   • Arthritis    • CAD (coronary artery disease)    • Hypercholesteremia    • Hypercholesterolemia 2/13/2012   • Hypertension    • MEDICAL HOME    • Pain     right leg & foot, left foot, lower back   • Stroke (HCC) 1999    aphasia , right sided weakness   • Unspecified cerebral artery occlusion without mention of cerebral infarction 2/13/2012     Past Surgical History:   Procedure Laterality Date   • LUMBAR FUSION POSTERIOR  9/26/2012    Performed by Chris Beltran M.D. at SURGERY [facility] ORS   • LUMBAR DECOMPRESSION  9/26/2012    Performed by Chris Beltran M.D. at SURGERY [facility] ORS   • PB INJ DX/THER AGNT PARAVERT FACET JOINT, ERVIN*  11/4/2011    Performed by HAMLET GASCA at [facility] ORS   • PB INJ DX/THER AGNT PARAVERT FACET JOINT, CE*  11/4/2011    Performed by HAMLET GASCA at [facility] ORS   • ESWT  12/9/2009    Performed by DEEPAK ALLEN at [facility] ORS   • KNEE REPLACEMENT, TOTAL  2002   • KNEE REPLACEMENT, TOTAL  2001    left   • BLOCK EPIDURAL STEROID INJECTION  2001    x 2 back   • OTHER CARDIAC SURGERY  2000    surgical maze   • OTHER  1999    cardiac ablation x 2   •  ANGIOGRAM  1999    cardiac   • OTHER  1994    bone spur removed right heel   • VEIN LIGATION  1987    left   • OTHER ABDOMINAL SURGERY  1981    stomach stapling   • COLONOSCOPY  2001/2007   • HIP REPLACEMENT, TOTAL  2002/2004    right/left   • MAZE PROCEDURE      2000   • MAZE PROCEDURE       Family History   Problem Relation Age of Onset   • Heart Disease Brother         atrial fib   • Cancer Sister         breast cancer   • Heart Disease Unknown    • Hypertension Unknown    • Stroke Unknown      Social History     Social History   • Marital status:      Spouse name: N/A   • Number of children: N/A   • Years of education: N/A     Occupational History   • Not on file.     Social History Main Topics   • Smoking status: Former Smoker     Quit date: 9/7/1967   • Smokeless tobacco: Never Used   • Alcohol use No      Comment: rarely   • Drug use: No   • Sexual activity: Not on file     Other Topics Concern   • Not on file     Social History Narrative   • No narrative on file     Allergies   Allergen Reactions   • Dye Fdc Blue [Brilliant Blue Fcf]      Angiogram contrast sensitivity, became very irritable     Outpatient Encounter Prescriptions as of 2/5/2019   Medication Sig Dispense Refill   • potassium chloride SA (KDUR) 20 MEQ Tab CR Take 2 Tabs by mouth 2 times a day. 360 Tab 3   • amLODIPine (NORVASC) 10 MG Tab TAKE ONE TABLET BY MOUTH DAILY - NEEDS TO BE SEEN FOR FURTHER REFILLS. 90 Tab 0   • triamterene/hctz (MAXZIDE-25/DYAZIDE) 37.5-25 MG Cap TAKE ONE CAPSULE BY MOUTH DAILY - NEEDS TO BE SEEN FOR FURTHER REFILLS. 90 Cap 0   • simvastatin (ZOCOR) 20 MG Tab TAKE ONE TABLET BY MOUTH EVERY EVENING (NEED TO BE SEEN FOR FURTHER REFILLS) 90 Tab 0   • losartan (COZAAR) 100 MG Tab Take 1 Tab by mouth every day. Needs to be seen for further refills. Thank you 90 Tab 0   • DULoxetine (CYMBALTA) 60 MG Cap DR Particles delayed-release capsule Take 1 Cap by mouth every day. 90 Cap 3   • Multiple Vitamins-Minerals (MULTI  "COMPLETE PO) Take  by mouth.     • diphenhydrAMINE (BENADRYL) 25 MG Tab Take 25 mg by mouth every 6 hours as needed for Sleep.     • aspirin (ASA) 81 MG Chew Tab chewable tablet Take 1 Tab by mouth every day. 100 Tab 11   • polyethylene glycol/lytes (MIRALAX) PACK Take 17 g by mouth every day.     • docusate sodium (COLACE) 100 MG CAPS Take 100 mg by mouth every day.     • vitamin D (CHOLECALCIFEROL) 1000 UNIT TABS Take 6,000 Units by mouth every day.     • naproxen (NAPROSYN) 500 MG Tab Take 1 Tab by mouth 2 times a day as needed (pain). 180 Tab 3   • Oxycodone-Acetaminophen (PERCOCET-10)  MG Tab Take 1 Tab by mouth every four hours as needed for Moderate Pain. Ok to refill percocet 28-30 days after the percocet written on 5/24/16 is filled 180 Tab 0   • [DISCONTINUED] desonide (TRIDESILON) 0.05 % Cream Apply 1 Application to affected area(s) 1 time daily as needed (rash). (Patient not taking: Reported on 2/5/2019) 45 g 3     No facility-administered encounter medications on file as of 2/5/2019.      ROS     Objective:   /74 (BP Location: Left arm, Patient Position: Sitting, BP Cuff Size: Large adult)   Pulse 74   Ht 1.778 m (5' 10\")   Wt 124.3 kg (274 lb)   SpO2 92%   BMI 39.31 kg/m²      Physical Exam   Constitutional: He is oriented to person, place, and time. He appears well-developed and well-nourished.   Overweight   HENT:   Head: Normocephalic and atraumatic.   Eyes: EOM are normal.   Neck: Normal range of motion. Neck supple.   Cardiovascular: Normal rate, regular rhythm and intact distal pulses.  Exam reveals no gallop and no friction rub.    No murmur heard.  Pulmonary/Chest: Effort normal and breath sounds normal.   Abdominal: Soft.   Musculoskeletal: Normal range of motion. He exhibits no edema.   Neurological: He is alert and oriented to person, place, and time.   Skin: Skin is warm and dry.   Psychiatric: He has a normal mood and affect. His behavior is normal. Judgment and thought " content normal.       Assessment:     1. PAF (paroxysmal atrial fibrillation) ([facility])  EKG   2. Speech and language deficit, late effect of cerebrovascular disease     3. Status post right hip replacement     4. Paroxysmal atrial fibrillation (HCC)     5. History of stroke     6. S/P Maze operation for atrial fibrillation     7. Essential hypertension     8. Class 3 severe obesity due to excess calories without serious comorbidity in adult, unspecified BMI ([facility])         Medical Decision Making:  Today's Assessment / Status / Plan:   1.  Hypertension continue current regimen some mild intermittent swelling related to Norvasc.  2.  Atrial fibrillation no recurrence status post maze.  3.  Obesity work on weight loss.  4.  Follow-up with us in 1 year.        Norman Peterson M.D.  [address]  [facility] [address]  VIA In Basket

## 2019-02-05 NOTE — PROGRESS NOTES
Chief Complaint   Patient presents with   • Atrial Fibrillation     F/V DX:PAF       Subjective:   Julio Gonzalez is a 73 y.o. male who presents today with obesity and previous history of atrial fibrillation status post maze.  Previous ablation.  Previous CVA.  Hypertension.  Moderate obesity.  Clinically stable denies any palpitations denies any chest pain or shortness of breath.  Systolic blood pressures less than 130.    Past Medical History:   Diagnosis Date   • Aphasia    • Arrhythmia     a-fib, ablation x 2   • Arthritis    • CAD (coronary artery disease)    • Hypercholesteremia    • Hypercholesterolemia 2/13/2012   • Hypertension    • MEDICAL HOME    • Pain     right leg & foot, left foot, lower back   • Stroke (HCC) 1999    aphasia , right sided weakness   • Unspecified cerebral artery occlusion without mention of cerebral infarction 2/13/2012     Past Surgical History:   Procedure Laterality Date   • LUMBAR FUSION POSTERIOR  9/26/2012    Performed by Chris Beltran M.D. at SURGERY [facility] ORS   • LUMBAR DECOMPRESSION  9/26/2012    Performed by Chris Beltran M.D. at SURGERY [facility] ORS   • PB INJ DX/THER AGNT PARAVERT FACET JOINT, ERVIN*  11/4/2011    Performed by HAMLET GASCA at [facility] ORS   • PB INJ DX/THER AGNT PARAVERT FACET JOINT, CE*  11/4/2011    Performed by HAMLET GASCA at [facility] ORS   • ESWT  12/9/2009    Performed by DEEPAK ALLEN at [facility] ORS   • KNEE REPLACEMENT, TOTAL  2002   • KNEE REPLACEMENT, TOTAL  2001    left   • BLOCK EPIDURAL STEROID INJECTION  2001    x 2 back   • OTHER CARDIAC SURGERY  2000    surgical maze   • OTHER  1999    cardiac ablation x 2   • ANGIOGRAM  1999    cardiac   • OTHER  1994    bone spur removed right heel   • VEIN LIGATION  1987    left   • OTHER ABDOMINAL SURGERY  1981    stomach stapling   • COLONOSCOPY  2001/2007   • HIP REPLACEMENT, TOTAL  2002/2004    right/left   • MAZE PROCEDURE       2000   • MAZE PROCEDURE       Family History   Problem Relation Age of Onset   • Heart Disease Brother         atrial fib   • Cancer Sister         breast cancer   • Heart Disease Unknown    • Hypertension Unknown    • Stroke Unknown      Social History     Social History   • Marital status:      Spouse name: N/A   • Number of children: N/A   • Years of education: N/A     Occupational History   • Not on file.     Social History Main Topics   • Smoking status: Former Smoker     Quit date: 9/7/1967   • Smokeless tobacco: Never Used   • Alcohol use No      Comment: rarely   • Drug use: No   • Sexual activity: Not on file     Other Topics Concern   • Not on file     Social History Narrative   • No narrative on file     Allergies   Allergen Reactions   • Dye Fdc Blue [Brilliant Blue Fcf]      Angiogram contrast sensitivity, became very irritable     Outpatient Encounter Prescriptions as of 2/5/2019   Medication Sig Dispense Refill   • potassium chloride SA (KDUR) 20 MEQ Tab CR Take 2 Tabs by mouth 2 times a day. 360 Tab 3   • amLODIPine (NORVASC) 10 MG Tab TAKE ONE TABLET BY MOUTH DAILY - NEEDS TO BE SEEN FOR FURTHER REFILLS. 90 Tab 0   • triamterene/hctz (MAXZIDE-25/DYAZIDE) 37.5-25 MG Cap TAKE ONE CAPSULE BY MOUTH DAILY - NEEDS TO BE SEEN FOR FURTHER REFILLS. 90 Cap 0   • simvastatin (ZOCOR) 20 MG Tab TAKE ONE TABLET BY MOUTH EVERY EVENING (NEED TO BE SEEN FOR FURTHER REFILLS) 90 Tab 0   • losartan (COZAAR) 100 MG Tab Take 1 Tab by mouth every day. Needs to be seen for further refills. Thank you 90 Tab 0   • DULoxetine (CYMBALTA) 60 MG Cap DR Particles delayed-release capsule Take 1 Cap by mouth every day. 90 Cap 3   • Multiple Vitamins-Minerals (MULTI COMPLETE PO) Take  by mouth.     • diphenhydrAMINE (BENADRYL) 25 MG Tab Take 25 mg by mouth every 6 hours as needed for Sleep.     • aspirin (ASA) 81 MG Chew Tab chewable tablet Take 1 Tab by mouth every day. 100 Tab 11   • polyethylene glycol/lytes (MIRALAX) PACK  "Take 17 g by mouth every day.     • docusate sodium (COLACE) 100 MG CAPS Take 100 mg by mouth every day.     • vitamin D (CHOLECALCIFEROL) 1000 UNIT TABS Take 6,000 Units by mouth every day.     • naproxen (NAPROSYN) 500 MG Tab Take 1 Tab by mouth 2 times a day as needed (pain). 180 Tab 3   • Oxycodone-Acetaminophen (PERCOCET-10)  MG Tab Take 1 Tab by mouth every four hours as needed for Moderate Pain. Ok to refill percocet 28-30 days after the percocet written on 5/24/16 is filled 180 Tab 0   • [DISCONTINUED] desonide (TRIDESILON) 0.05 % Cream Apply 1 Application to affected area(s) 1 time daily as needed (rash). (Patient not taking: Reported on 2/5/2019) 45 g 3     No facility-administered encounter medications on file as of 2/5/2019.      ROS     Objective:   /74 (BP Location: Left arm, Patient Position: Sitting, BP Cuff Size: Large adult)   Pulse 74   Ht 1.778 m (5' 10\")   Wt 124.3 kg (274 lb)   SpO2 92%   BMI 39.31 kg/m²     Physical Exam   Constitutional: He is oriented to person, place, and time. He appears well-developed and well-nourished.   Overweight   HENT:   Head: Normocephalic and atraumatic.   Eyes: EOM are normal.   Neck: Normal range of motion. Neck supple.   Cardiovascular: Normal rate, regular rhythm and intact distal pulses.  Exam reveals no gallop and no friction rub.    No murmur heard.  Pulmonary/Chest: Effort normal and breath sounds normal.   Abdominal: Soft.   Musculoskeletal: Normal range of motion. He exhibits no edema.   Neurological: He is alert and oriented to person, place, and time.   Skin: Skin is warm and dry.   Psychiatric: He has a normal mood and affect. His behavior is normal. Judgment and thought content normal.       Assessment:     1. PAF (paroxysmal atrial fibrillation) (HCC)  EKG   2. Speech and language deficit, late effect of cerebrovascular disease     3. Status post right hip replacement     4. Paroxysmal atrial fibrillation (HCC)     5. History of " stroke     6. S/P Maze operation for atrial fibrillation     7. Essential hypertension     8. Class 3 severe obesity due to excess calories without serious comorbidity in adult, unspecified BMI (HCC)         Medical Decision Making:  Today's Assessment / Status / Plan:   1.  Hypertension continue current regimen some mild intermittent swelling related to Norvasc.  2.  Atrial fibrillation no recurrence status post maze.  3.  Obesity work on weight loss.  4.  Follow-up with us in 1 year.

## 2019-03-12 DIAGNOSIS — I48.0 PAF (PAROXYSMAL ATRIAL FIBRILLATION) (HCC): ICD-10-CM

## 2019-03-12 DIAGNOSIS — I10 HYPERTENSION, UNSPECIFIED TYPE: ICD-10-CM

## 2019-03-12 DIAGNOSIS — I10 ESSENTIAL HYPERTENSION: ICD-10-CM

## 2019-03-12 DIAGNOSIS — E78.5 DYSLIPIDEMIA: ICD-10-CM

## 2019-03-12 RX ORDER — LOSARTAN POTASSIUM 100 MG/1
100 TABLET ORAL DAILY
Qty: 90 TAB | Refills: 3 | Status: SHIPPED | OUTPATIENT
Start: 2019-03-12 | End: 2019-04-05 | Stop reason: SDUPTHER

## 2019-03-12 RX ORDER — SIMVASTATIN 20 MG
20 TABLET ORAL EVERY EVENING
Qty: 90 TAB | Refills: 3 | Status: SHIPPED | OUTPATIENT
Start: 2019-03-12 | End: 2020-03-16

## 2019-03-12 RX ORDER — TRIAMTERENE AND HYDROCHLOROTHIAZIDE 37.5; 25 MG/1; MG/1
1 CAPSULE ORAL DAILY
Qty: 90 CAP | Refills: 3 | Status: SHIPPED | OUTPATIENT
Start: 2019-03-12 | End: 2020-03-16

## 2019-03-12 RX ORDER — AMLODIPINE BESYLATE 10 MG/1
10 TABLET ORAL DAILY
Qty: 90 TAB | Refills: 3 | Status: SHIPPED | OUTPATIENT
Start: 2019-03-12 | End: 2020-03-16

## 2019-04-05 DIAGNOSIS — I48.0 PAF (PAROXYSMAL ATRIAL FIBRILLATION) (HCC): ICD-10-CM

## 2019-04-05 DIAGNOSIS — I10 ESSENTIAL HYPERTENSION: ICD-10-CM

## 2019-04-05 RX ORDER — LOSARTAN POTASSIUM 100 MG/1
100 TABLET ORAL DAILY
Qty: 100 TAB | Refills: 2 | Status: SHIPPED | OUTPATIENT
Start: 2019-04-05 | End: 2020-04-28

## 2019-07-05 ENCOUNTER — TELEPHONE (OUTPATIENT)
Dept: MEDICAL GROUP | Facility: MEDICAL CENTER | Age: 74
End: 2019-07-05

## 2019-07-05 NOTE — TELEPHONE ENCOUNTER
ESTABLISHED PATIENT PRE-VISIT PLANNING     Patient was NOT contacted to complete PVP. I left a message with his wife regarding his health maintenance topics.     Note: Patient will not be contacted if there is no indication to call.     1.  Reviewed notes from the last few office visits within the medical group: Yes    2.  If any orders were placed at last visit or intended to be done for this visit (i.e. 6 mos follow-up), do we have Results/Consult Notes?        •  Labs - Labs were not ordered at last office visit.   Note: If patient appointment is for lab review and patient did not complete labs, check with provider if OK to reschedule patient until labs completed.       •  Imaging - Imaging was not ordered at last office visit.       •  Referrals - No referrals were ordered at last office visit.    3. Is this appointment scheduled as a Hospital Follow-Up? No    4.  Immunizations were updated in [facility] using WebIZ?: Yes       •  Web Iz Recommendations: HEPATITIS B, PNEUMOVAX (PPSV23), TDAP and SHINGRIX (Shingles)    5.  Patient is due for the following Health Maintenance Topics:   Health Maintenance Due   Topic Date Due   • HEPATITIS C SCREENING  1945   • IMM HEP B VACCINE (1 of 3 - Risk 3-dose series) 07/11/1965   • IMM ZOSTER VACCINES (2 of 3) 12/21/2010   • IMM DTaP/Tdap/Td Vaccine (1 - Tdap) 08/06/2011   • IMM PNEUMOCOCCAL 65+ (ADULT) LOW/MEDIUM RISK SERIES (2 of 2 - PPSV23) 08/27/2016   • COLONOSCOPY  04/06/2017   • Annual Wellness Visit  11/30/2017       6. Orders for overdue Health Maintenance topics pended in Pre-Charting? NO    7.  AHA (MDX) form printed for Provider? No, already completed    8.  Patient was NOT informed to arrive 15 min prior to their scheduled appointment and bring in their medication bottles.

## 2019-07-09 ENCOUNTER — OFFICE VISIT (OUTPATIENT)
Dept: MEDICAL GROUP | Facility: MEDICAL CENTER | Age: 74
End: 2019-07-09
Payer: MEDICARE

## 2019-07-09 ENCOUNTER — TELEPHONE (OUTPATIENT)
Dept: MEDICAL GROUP | Facility: MEDICAL CENTER | Age: 74
End: 2019-07-09

## 2019-07-09 VITALS
BODY MASS INDEX: 40.52 KG/M2 | HEART RATE: 70 BPM | OXYGEN SATURATION: 93 % | SYSTOLIC BLOOD PRESSURE: 132 MMHG | HEIGHT: 70 IN | TEMPERATURE: 98.7 F | DIASTOLIC BLOOD PRESSURE: 78 MMHG | WEIGHT: 283 LBS

## 2019-07-09 DIAGNOSIS — G47.00 INSOMNIA, UNSPECIFIED TYPE: ICD-10-CM

## 2019-07-09 DIAGNOSIS — F11.20 OPIATE DEPENDENCE, CONTINUOUS (HCC): Chronic | ICD-10-CM

## 2019-07-09 DIAGNOSIS — E66.01 CLASS 3 SEVERE OBESITY DUE TO EXCESS CALORIES WITHOUT SERIOUS COMORBIDITY IN ADULT, UNSPECIFIED BMI (HCC): ICD-10-CM

## 2019-07-09 DIAGNOSIS — F11.90 CHRONIC NARCOTIC USE: ICD-10-CM

## 2019-07-09 DIAGNOSIS — E78.5 DYSLIPIDEMIA: Chronic | ICD-10-CM

## 2019-07-09 DIAGNOSIS — Z98.890 STATUS POST LUMBAR SURGERY: Chronic | ICD-10-CM

## 2019-07-09 DIAGNOSIS — R73.09 IMPAIRED GLUCOSE METABOLISM: ICD-10-CM

## 2019-07-09 DIAGNOSIS — I48.0 PAROXYSMAL ATRIAL FIBRILLATION (HCC): Chronic | ICD-10-CM

## 2019-07-09 DIAGNOSIS — M54.50 MIDLINE LOW BACK PAIN WITHOUT SCIATICA, UNSPECIFIED CHRONICITY: ICD-10-CM

## 2019-07-09 DIAGNOSIS — M79.606 PAIN OF LOWER EXTREMITY, UNSPECIFIED LATERALITY: ICD-10-CM

## 2019-07-09 PROCEDURE — 99213 OFFICE O/P EST LOW 20 MIN: CPT | Performed by: INTERNAL MEDICINE

## 2019-07-09 ASSESSMENT — ENCOUNTER SYMPTOMS: GENERAL WELL-BEING: FAIR

## 2019-07-09 ASSESSMENT — ACTIVITIES OF DAILY LIVING (ADL): BATHING_REQUIRES_ASSISTANCE: 0

## 2019-07-09 ASSESSMENT — PATIENT HEALTH QUESTIONNAIRE - PHQ9: CLINICAL INTERPRETATION OF PHQ2 SCORE: 0

## 2019-07-09 NOTE — LETTER
[employer]  Norman Peterson M.D.  [address]  [place] [address]  Fax: [phone]   Authorization for Release/Disclosure of   Protected Health Information   Name: SHANITA PROCTOR : 1945 SSN: [id number]   Address: [address] [place] [address]  [place] [address] Phone:    [phone] (home)    I authorize the entity listed below to release/disclose the PHI below to:   [employer] [facility]/Norman Peterson M.D. and Norman Peterson M.D.   Provider or Entity Name:                                                   Dr Mathias (Physiatry)   Address   City, State, [facility]   Phone:      Fax:     Reason for request: continuity of care   Information to be released:    [  ] LAST COLONOSCOPY,  including any PATH REPORT and follow-up  [  ] LAST FIT/COLOGUARD RESULT [  ] LAST DEXA  [  ] LAST MAMMOGRAM  [  ] LAST PAP  [  ] LAST LABS [  ] RETINA EXAM REPORT  [  ] IMMUNIZATION RECORDS  [  ] Release all info      [  ] Check here and initial the line next to each item to release ALL health information INCLUDING  _____ Care and treatment for drug and / or alcohol abuse  _____ HIV testing, infection status, or AIDS  _____ Genetic Testing    DATES OF SERVICE OR TIME PERIOD TO BE DISCLOSED: _____________  I understand and acknowledge that:  * This Authorization may be revoked at any time by you in writing, except if your health information has already been used or disclosed.  * Your health information that will be used or disclosed as a result of you signing this authorization could be re-disclosed by the recipient. If this occurs, your re-disclosed health information may no longer be protected by State or Federal laws.  * You may refuse to sign this Authorization. Your refusal will not affect your ability to obtain treatment.  * This Authorization becomes effective upon signing and will  on (date) __________.      If no date is indicated, this Authorization will  one (1) year from the signature date.    Name:  Julio Gonzalez    Signature:   Date:     7/9/2019       PLEASE FAX REQUESTED RECORDS BACK TO: [phone]

## 2019-07-09 NOTE — PROGRESS NOTES
Subjective:      Julio Gonzalez is a 73 y.o. male who presents with htn Follow-Up              HPI       Patient is here with his wife for follow-up.  Here for follow up blood pressure remains on losartan 100 mg daily and amlodipine 10 mg daily, not checking blood pressure on a regular basis.  Has tried various diets and nutrition programs including Atkins and nutrisystem and weight watchers, some have been initially successful but has always gained weight again.  Patient states that he has not been exercising at all, and has chronic pain which limits his exercise capacity.  Currently followed by  pain management currently on oxycodone 10 mg, quantity 140 tablets per every 20 days/refill from pain management on  was 6/18/19.  Also remains on naproxen 500 mg twice a day and Cymbalta 60 mg daily.  Status post lumbar surgery 2012 L5-S1 fusion, decompression, has seen neurosurgery, pain management, has tried physical therapy, has also tried hydrocodone, pregabalin.  Good social support with wife.  No alcohol, no tobacco.      Current Outpatient Prescriptions   Medication Sig Dispense Refill   • zolpidem (AMBIEN) 10 MG Tab Take 1 Tab by mouth at bedtime as needed for Sleep for up to 90 days. 90 Tab 1   • losartan (COZAAR) 100 MG Tab Take 1 Tab by mouth every day. 100 Tab 2   • amLODIPine (NORVASC) 10 MG Tab Take 1 Tab by mouth every day. 90 Tab 3   • triamterene/hctz (MAXZIDE-25/DYAZIDE) 37.5-25 MG Cap Take 1 Cap by mouth every day. 90 Cap 3   • simvastatin (ZOCOR) 20 MG Tab Take 1 Tab by mouth every evening. 90 Tab 3   • potassium chloride SA (KDUR) 20 MEQ Tab CR Take 2 Tabs by mouth 2 times a day. 360 Tab 3   • naproxen (NAPROSYN) 500 MG Tab Take 1 Tab by mouth 2 times a day as needed (pain). 180 Tab 3   • DULoxetine (CYMBALTA) 60 MG Cap DR Particles delayed-release capsule Take 1 Cap by mouth every day. 90 Cap 3   • Multiple Vitamins-Minerals (MULTI COMPLETE PO) Take  by mouth.     •  diphenhydrAMINE (BENADRYL) 25 MG Tab Take 25 mg by mouth every 6 hours as needed for Sleep.     • Oxycodone-Acetaminophen (PERCOCET-10)  MG Tab Take 1 Tab by mouth every four hours as needed for Moderate Pain. Ok to refill percocet 28-30 days after the percocet written on 5/24/16 is filled 180 Tab 0   • aspirin (ASA) 81 MG Chew Tab chewable tablet Take 1 Tab by mouth every day. 100 Tab 11   • polyethylene glycol/lytes (MIRALAX) PACK Take 17 g by mouth every day.     • docusate sodium (COLACE) 100 MG CAPS Take 100 mg by mouth every day.     • vitamin D (CHOLECALCIFEROL) 1000 UNIT TABS Take 6,000 Units by mouth every day.       No current facility-administered medications for this visit.      Status post lumbar surgery  2/01 MRI lumbar spine DJD L5-S1 anterolisthesis 4-5 mm marked stenosis saw  neurosurgery  9/09  neurosurgery note  12/09 neurosurgery note, severe left-sided foraminal stenosis, L5-S1 spondylolisthesis 4-5 mm recommend surgery, patient declined, has had epidural with no benefit,tried lyrica and neurontin before, declines cymbalta trial  4/10 note dr. johnson neurosurgery who gives patient norco, he is retiring, and has offered patient surgical therapy versus continuing norco 10 mg which we'll assume dispensing.  10/11 MRI lumbar spine grade 1 spondylolisthesis 9mm, bilateral spondylosis L5, moderate bilateral L5 stenosis, 2.4 cm left kidney cyst  10/11 xray LS, grade 1 spondylolisthesis L5 on S1 increased from prior exam, 1.3 cm, no significant change in flexion  11/11  pain note right L5-S1 transforaminal epidural  1/16/12 start cymbalta trial (3/12 stop cymbalta no benefit)  1/12 dr. murphy neurosurgery note surgical intervention offered L4 to sacrum decompression  3/26/12 restarted cymbalta    9/26/12  neurosurgery operative note decompression at L5-S1and bilateral foraminotomies,transforaminal lumbar interbody fusion, L4-L5 and L5-S1, with expandable cage  8-12 mm.  2/20/13 dr. murphy neurosurgery note, lumbar films, EMG NCS lower ext inconclusive, repeat MRI lumbar spine pending  3/13/13 MRI lumbar spine postoperative changes L4-S1 lumbar laminectomy, interbody fusion, disc space insert L4-L5 L5-S1, posterior spinal fusion and fixation, L5-S1 moderate right foraminal stenosis. 2.4 cm mid right renal cyst unchanged  3/26/13 dr. murphy neurosurgery note, no further surgical intervention necessary, chronic narcotics norco 10 mg 5 per day; I will assume the norco rx from   1/25/15 off naprosyn and cymbalta; on norco 10 mg one every four hours #150  5/21/15 on percocet 10 mg five per day and on cymbalta 60 mg  3/25/16 start lyrica 50 mg tid for fibromyalgia, continue percocet 10 mg 5 per day and cymbalta 60 mg  4/12/16 increase lyrica to 100 mg tid (50 mg two tid), continue cymbalta 60 mg and percocet 10 mg 5 per day  9/26/16 off lyrica, on percocet 10 mg qid per  pain management and cymbalta     Speech language deficit  1999 affected right arm, no old records  Some diff with short recall, and occasional diff with expressing self when fatigued     Status post knee replacement left 2001     Status post hip replacement bilateral  12/03 right total hip replacement  1/04 left total hip replacement      Preventative health  4/6/07 colonoscopy [facility] negative  9/17/09 pneumovax  10/26/10 zoster vaccine  8/5/11 td  8/27/15 prevnar at [facility]  9/29/17 declines colonoscopy,FIT card provided  rinted  11/8/18 vit d 46  11/8/18 psa 1.4  1/4/19 FIT negative  1/8/19 shingrix to get at pharmacy     Paroxysmal atrial fibrillation  Sees [facility]  2005 s/p maze procedure with a stroke related to that as a complication, have no records at all regarding that, no CT scan or MRI scan and probably had a cardiac catheterization by renal physicians in 2005 that was negative for  Coronary artery disease.  8/11 [facility] note EKG NSR  5/12 [facility] note, on asa daily  11/12 [facility] note  4/29/13  cardiology note  11/3/13 cardiology note on asa  9/22/14 cardiology note sinus, follow up 6 months  5/12/15  cardiology note, paroxysmal atrial fibrillation status post maze operation, sinus rhythm  11/18/15 cardiology note stable follow one year  4/4/17  cardiology note, paroxysmal atrial fibrillation no recurrence, follow-up one year  2/5/19 cardiology note paroxysmal atrial fibrillation, sinus on exam, no recurrence of atrial fibrillation status post maze, follow-up 1 year     Insomnia  2009 on ambien 10 mg  2/19/16 unable to taper ambien, referral to behavioral sleep psychology recommended he declines  9/26/16 on ambien 10 mg work on taper, declines referral to sleep psychology  7/9/18 on ambien declines sleep management referral     Impaired glucose metabolism  11/8/18 A1c 6%     Hypertension  7/10 [facility] note change from hyzaar to enalapril hct 10/25  1/11 [facility] note bp not controlled on enalapril hct 10/25, change back to losartan hct 100/25 and norvasc 10 mg  6/12 K+ 3.0, increase kdur to 20 tid, consider decreasing hctz if possible to decrease K+ supplementation  12/12 cozaar 100 mg, dyazide 37.5/25 mg, kcl 20 meq, norvasc 10 mg  1/29/14 on cozaar 100 mg, dyazide 37.5/25 mg, norvasc 10 mg, klor 20 meq bid; will increase to klor 20 meq tid  9/3//14 K+ 3.1 on klor 20 meq tid, increase to 20 meq two tab bid  9/3/14 urine mac <0.5 on cozaar 100 mg, dyazide 37.5/25 mg, norvasc 10 mg, klor 20 meq two tab bid  6/30/15 Na 132,K+ 3.5 on cozaar 100 mg, dyazide 37.5/25 mg, norvasc 10 mg, klor 20 meq two tab bid  8/31/16 K+ 3.3 on cozaar 100 mg, dyazide 37.5/25 mg, norvasc 10 mg, klor 20 meq two tab qday will increase to 2 bid  7/11/17 K+3.3 on cozaar 100 mg, dyazide 37.5/25 mg, norvasc 10 mg, klor 20 meq two bid  2/5/19 cardiology note paroxysmal atrial fibrillation, sinus on exam, no recurrence of atrial fibrillation status post maze, follow-up 1 year     History stroke  1999 affected right arm, no old  records  Some difficulty with short recall, and occasional diff with expressing self when fatigued     History squamous cell carcinoma  Saw  dermatology offered aldara he declined  3/27/17  dermatology note  5/1/17  dermatology biopsy proven squamous cell carcinoma in situ left forehead, here for mohs  9/19/17  dermatology note continue      Fibromyalgia  3/25/16 start lyrica 50 mg tid for fibromyalgia, continue percocet 10 mg 5 per day and cymbalta 60 mg  4/12/16 increase lyrica to 100 mg tid (50 mg two tid), continue cymbalta 60 mg and percocet 10 mg 5 per day  4/28/16 increase lyrica to 150 mg tid, continue cymbalta 60 mg and percocet 10 mg five times per day  5/10/16 on lyrica 50 mg three tabs tid, change to 100 mg am, 150 mg noon, 100 mg pm     Dyslipidemia  Followed by cardiology  2/09 cholesterol 135, triglycerides 100, HDL 47, LDL 68  8/09 chol 138,trig 101,hdl 49,ldl 69  3/10 chol 123,trig 66,hdl 49,ldl 61  7/10 [facility] note change vytorin to zocor 40  11/10 chol 141,trig 75,hdl 62,ldl 64 on vytorin 10/20 mg per [facility]  10/11 chol 159,trig 102,hdl 52,ldl 87 on vytorin 10/20 per [facility]  2/29/12 stop vytorin, change to zocor 20 mg per [facility]  6/12 chol 152,trig 102,hdl 49,ldl 83 on zocor 20 mg  6/13 chol 150,trig 130,hdl 52,ldl 72 on zocor 20 mg  1/29/14 chol 147,trig 127,hdl 50,ldl 72 on zocor 20 mg  9/3/14 chol 169,trig 228,hdl 49,ldl 74 on zocor 20 mg  6/30/15 chol 151,trig 88,hdl 63,ldl 70 on zocor 20 mg  8/31/16 chol 169,trig 130,hdl 59,ldl 84 on zocor 20 mg  7/11/17 chol 147,trig 146,hdl 51,ldl 67 on zocor 20 mg  11/8/18 chol 189,trig 215,hdl 54,ldl 92 on zocor 20 mg     Depression  3/12 tried cymalta for pain no benefit  12/6/13 PHQ 9 score 15, start cymbalta samples 30 mg x 7 days, then 60 mg  12/20/13 cymbalta 60 mg  1/22/15 off cymbalta    5/21/15 on cymbalta 60 mg  2/19/16 referral to behavioral health recommended he declines  11/29/16 depression screening score 0,  continues on cymbalta  1/30/18 depression screening score 0 on cymbalta     Chronic opiate  3/26/13 dr. murphy neurosurgery note, no further surgical intervention necessary, chronic narcotics norco 10 mg 5 per day; I will assume the norco rx from   12/6/13 change from hydrocodone 10 mg 4-5 tablets per day to oxycodone 10 mg 4-5 tabs per day  1/22/15 UDT millenium consistent  2/24/15 change from hydrocodone 10 mg to oxycodone 10 mg (percocet) qid #150 per 30 days  1/22/14 norco 10 mg #180, millenium URT done  2/24/15 change to percocet 10 mg #150 only one prescription provided, if successful for pain relief call us and I will write next 3 refills that he can  and then followup in office after that  5/21/15 percocet 10 mg #150 refill  5/21/15 narcotic contract  5/21/15 opiate risk score 1  8/24/15 refill percocet #150 x 3  8/24/15 referral pain management  10/4/15   2/19/16   2/19/16 increase frequency to percocet 10 mg q 4 hours #180 per month, declined trial of MS contin, because of increasing utilization, referral made to pain management   5/24/16   5/24/16 UDT millennium  5/24/16 refill percocet #150 x 2 refer to  pain management to assume opiate prescription writing   6/22/16  pain management note, initiate pregabalin, work on taper oxycodone  7/6/16  continue oxycodone and pregabalin  8/9/16  pain management note continue oxycodone, consider ultrasound-guided knee injections to right  9/12/16  pain management note, UDT consistent     Cervical pain  3/06 MRI cervical spine moderate subdural frontal stenosis C5-C6 lesser extent C6-C7     Arthritis knee  9/3/14 x-ray right knee marked tricompartmental osteoarthritis, most severe medial compartment  2/19/16 has seen  orthopedics, declines knee replacement                     Patient Active Problem List   Diagnosis   • S/P hip replacement   • Status post lumbar surgery   •  Cervical pain   • Hypertension   • Paroxysmal atrial fibrillation (HCC)   • Dyslipidemia   • History of squamous cell carcinoma   • Preventative health care   • S/P knee replacement   • Obesity   • Plantar fasciitis   • History of stroke   • Insomnia   • Depression, major, in partial remission (HCC)   • Chronic pain   • Arthritis of knee, right   • Opiate dependence, continuous (CMS-HCC)   • Speech and language deficit, late effect of cerebrovascular disease   • S/P Maze operation for atrial fibrillation   • Fibromyalgia   • Impaired glucose metabolism     Depression Screening    Little interest or pleasure in doing things?  0 - not at all  Feeling down, depressed , or hopeless? 0 - not at all  Patient Health Questionnaire Score: 0     If depressive symptoms identified deferred to follow up visit unless specifically addressed in assessment and plan.    Interpretation of PHQ-9 Total Score   Score Severity   1-4 No Depression   5-9 Mild Depression   10-14 Moderate Depression   15-19 Moderately Severe Depression   20-27 Severe Depression    Screening for Cognitive Impairment    Three Minute Recall (village, kitchen, baby) 1/3    Ken clock face with all 12 numbers and set the hands to show 10 past 10.  No    Cognitive concerns identified deferred for follow up unless specifically addressed in assessment and plan.    Fall Risk Assessment    Has the patient had two or more falls in the last year or any fall with injury in the last year?  No    Safety Assessment    Throw rugs on floor.  Yes  Handrails on all stairs.  No  Good lighting in all hallways.  Yes  Difficulty hearing.  No  Patient counseled about all safety risks that were identified.    Functional Assessment ADLs    Are there any barriers preventing you from cooking for yourself or meeting nutritional needs?  No.    Are there any barriers preventing you from driving safely or obtaining transportation?  Yes.    Are there any barriers preventing you from using a  telephone or calling for help?  No.    Are there any barriers preventing you from shopping?  Yes.    Are there any barriers preventing you from taking care of your own finances?  Yes.    Are there any barriers preventing you from managing your medications?  Yes.    Are there any barriers preventing you from showering, bathing or dressing yourself?  No.    Are you currently engaging in any exercise or physical activity?  No.     What is your perception of your health?  Fair.      Health Maintenance Summary                HEPATITIS C SCREENING Overdue 1945     IMM HEP B VACCINE Overdue 7/11/1965     IMM DTaP/Tdap/Td Vaccine Overdue 8/6/2011      Done 8/5/2011 Imm Admin: TD Vaccine    IMM PNEUMOCOCCAL 65+ (ADULT) LOW/MEDIUM RISK SERIES Overdue 8/27/2016      Done 8/27/2015 Imm Admin: Pneumococcal Conjugate Vaccine (Prevnar/PCV-13)     Patient has more history with this topic...    COLONOSCOPY Overdue 4/6/2017      Done 4/6/2007 REFERRAL TO GI FOR COLONOSCOPY     Patient has more history with this topic...    Annual Wellness Visit Overdue 11/30/2017      Done 11/29/2016 Visit Dx: Medicare annual wellness visit, subsequent     Patient has more history with this topic...    IMM ZOSTER VACCINES Overdue 4/2/2019      Done 2/5/2019 Imm Admin: Recombinant Zoster Vaccine (RZV) (Shingrix)     Patient has more history with this topic...    IMM INFLUENZA Next Due 9/1/2019      Done 1/8/2019 Imm Admin: Influenza Vaccine Adult HD     Patient has more history with this topic...          Patient Care Team:  Norman Peterson M.D. as PCP - General  Jose Martin Campbell M.D. as Consulting Physician (Anesthesia)  Chris Beltran M.D. as Consulting Physician (Neurosurgery)  Irineo Skinner M.D. as Consulting Physician (Cardiac Electrophysiology)  Pete Rangel O.D. as Consulting Physician (Optometry)      ROS       Objective:     /78 (BP Location: Right arm, Patient Position: Sitting, BP Cuff Size: Large adult)   Pulse 70   Temp  "37.1 °C (98.7 °F)   Ht 1.778 m (5' 10\")   Wt (!) 128.4 kg (283 lb)   SpO2 93%   BMI 40.61 kg/m²      Physical Exam   Constitutional: He appears well-developed and well-nourished. No distress.   HENT:   Head: Normocephalic and atraumatic.   Eyes: Conjunctivae are normal. Right eye exhibits no discharge. Left eye exhibits no discharge.   Neck: No JVD present.   Cardiovascular: Normal rate and regular rhythm.    Pulmonary/Chest: Effort normal and breath sounds normal. No respiratory distress.   Abdominal: He exhibits no distension.   Neurological: He is alert.   Skin: Skin is warm. He is not diaphoretic.   Psychiatric: He has a normal mood and affect. His behavior is normal. Thought content normal.   Nursing note and vitals reviewed.              Assessment/Plan:     Assessment  #!  Hypertension stable on losartan and amlodipine, followed by cardiology, not checking blood pressures on a regular basis at home    #2 chronic low back pain and leg pain, followed by pain management Dr. Mathias on oxycodone 10 mg quantity 140 tablets per 20 days, pain has not been ideally controlled, has tried multiple medications previously, also remains on Cymbalta for chronic pain    #3 BMI 40.6 has declined weight management program and bariatric evaluation    #4 paroxysmal atrial fibrillation status post Maze procedure, sinus on exam followed by cardiology    #5 impaired glucose metabolism    #6 dyslipidemia on Zocor    Plan  #1 continue amlodipine and losartan, check blood pressure periodically at home    #2 declines weight management program referral, advised patient to possibly try one of the previous diets that has been successful for him, perhaps the Atkins diet or a modified Atkins diet.  We will try to optimize his diet since he is not able to exercise currently because of his pain, understanding weight may be contributing to blood pressure, cholesterol, or blood sugar issues.    #3 declines physical therapy at this " time    #4 continue follow-up Dr. Mathias, I believe patient would benefit from a second opinion with regards to pain management, patient states that he is having to see his pain specialist every 28 days for refills, will obtain old records from their office,  was reviewed by myself and the patient has been consistent with his medication refills without any other prescriptions from other providers.  He has good social support with wife, does not drink alcohol.  Perhaps a second opinion from our [facility] pain management colleagues would be beneficial.  Patient and wife are in agreement    #5 follow-up 4 to 5 months with myself    #6 repeat labs

## 2019-07-11 ENCOUNTER — TELEPHONE (OUTPATIENT)
Dept: MEDICAL GROUP | Facility: MEDICAL CENTER | Age: 74
End: 2019-07-11

## 2019-07-12 ENCOUNTER — TELEPHONE (OUTPATIENT)
Dept: MEDICAL GROUP | Facility: MEDICAL CENTER | Age: 74
End: 2019-07-12

## 2019-07-12 NOTE — TELEPHONE ENCOUNTER
Spoke with Shreyas in [facility], all has been changed and he should be able to call and schedule with them now.     Pt notified via detailed message on VM.

## 2019-07-12 NOTE — TELEPHONE ENCOUNTER
The referral I placed was for [facility], I am not sure why this was processed incorrectly.  Please check with [facility] as Dr. Hernández is with [facility].     Please notify the patient about this, we will try to correct that.

## 2019-07-12 NOTE — TELEPHONE ENCOUNTER
Please notify patient that [facility] has reprocessed the pain management referral and is going to for that to Dr. Hernández and the [facility] pain management department

## 2019-07-12 NOTE — TELEPHONE ENCOUNTER
Julio Gonzalez  [phone] (home)     Pt was under the impression that you were sending him to Dr Hernández for pain mgmt.   Very confused, Dr Hernández works for [employer] and they got a call from [employer]. Would like you to redo REF for Dr Hernández.

## 2019-11-27 ENCOUNTER — PATIENT OUTREACH (OUTPATIENT)
Dept: HEALTH INFORMATION MANAGEMENT | Facility: OTHER | Age: 74
End: 2019-11-27

## 2019-11-27 SDOH — ECONOMIC STABILITY: TRANSPORTATION INSECURITY
IN THE PAST 12 MONTHS, HAS LACK OF TRANSPORTATION KEPT YOU FROM MEETINGS, WORK, OR FROM GETTING THINGS NEEDED FOR DAILY LIVING?: NO

## 2019-11-27 SDOH — ECONOMIC STABILITY: FOOD INSECURITY: WITHIN THE PAST 12 MONTHS, THE FOOD YOU BOUGHT JUST DIDN'T LAST AND YOU DIDN'T HAVE MONEY TO GET MORE.: NEVER TRUE

## 2019-11-27 SDOH — ECONOMIC STABILITY: FOOD INSECURITY: WITHIN THE PAST 12 MONTHS, YOU WORRIED THAT YOUR FOOD WOULD RUN OUT BEFORE YOU GOT MONEY TO BUY MORE.: NEVER TRUE

## 2019-11-27 SDOH — ECONOMIC STABILITY: TRANSPORTATION INSECURITY
IN THE PAST 12 MONTHS, HAS THE LACK OF TRANSPORTATION KEPT YOU FROM MEDICAL APPOINTMENTS OR FROM GETTING MEDICATIONS?: NO

## 2019-11-27 NOTE — PROGRESS NOTES
1. [facility] Verified: yes    2. Verify PCP: yes    3. Review and add  to Care Team: yes    5. Reviewed/Updated the following with patient:       •   Communication Preference Obtained? YES  • MyChart Activation: already active       •   E-Mail Address Obtained? YES       •   Appointment Day and Time Preferences? YES       •   Preferred Pharmacy? YES       •   Preferred Lab? YES    6. Care Gap Scheduling (Attempt to Schedule EACH Overdue Care Gap!)    Patient declined Immunizations: FLU and all other over due care gaps    * Spoke with wife.

## 2019-12-05 DIAGNOSIS — G47.00 INSOMNIA, UNSPECIFIED TYPE: ICD-10-CM

## 2019-12-05 RX ORDER — ZOLPIDEM TARTRATE 10 MG/1
10 TABLET ORAL NIGHTLY PRN
Qty: 90 EACH | Refills: 1 | Status: SHIPPED
Start: 2019-12-05 | End: 2020-03-04

## 2019-12-06 ENCOUNTER — HOSPITAL ENCOUNTER (OUTPATIENT)
Dept: LAB | Facility: MEDICAL CENTER | Age: 74
End: 2019-12-06
Attending: INTERNAL MEDICINE
Payer: MEDICARE

## 2019-12-06 DIAGNOSIS — R73.09 IMPAIRED GLUCOSE METABOLISM: ICD-10-CM

## 2019-12-06 DIAGNOSIS — E78.5 DYSLIPIDEMIA: Chronic | ICD-10-CM

## 2019-12-06 LAB
ALBUMIN SERPL BCP-MCNC: 4.3 G/DL (ref 3.2–4.9)
ALBUMIN/GLOB SERPL: 1.5 G/DL
ALP SERPL-CCNC: 64 U/L (ref 30–99)
ALT SERPL-CCNC: 18 U/L (ref 2–50)
ANION GAP SERPL CALC-SCNC: 12 MMOL/L (ref 0–11.9)
AST SERPL-CCNC: 21 U/L (ref 12–45)
BASOPHILS # BLD AUTO: 0.5 % (ref 0–1.8)
BASOPHILS # BLD: 0.05 K/UL (ref 0–0.12)
BILIRUB SERPL-MCNC: 0.8 MG/DL (ref 0.1–1.5)
BUN SERPL-MCNC: 14 MG/DL (ref 8–22)
CALCIUM SERPL-MCNC: 9.4 MG/DL (ref 8.5–10.5)
CHLORIDE SERPL-SCNC: 100 MMOL/L (ref 96–112)
CHOLEST SERPL-MCNC: 163 MG/DL (ref 100–199)
CO2 SERPL-SCNC: 26 MMOL/L (ref 20–33)
CREAT SERPL-MCNC: 0.69 MG/DL (ref 0.5–1.4)
EOSINOPHIL # BLD AUTO: 0.14 K/UL (ref 0–0.51)
EOSINOPHIL NFR BLD: 1.5 % (ref 0–6.9)
ERYTHROCYTE [DISTWIDTH] IN BLOOD BY AUTOMATED COUNT: 44.1 FL (ref 35.9–50)
EST. AVERAGE GLUCOSE BLD GHB EST-MCNC: 120 MG/DL
FASTING STATUS PATIENT QL REPORTED: NORMAL
GLOBULIN SER CALC-MCNC: 2.8 G/DL (ref 1.9–3.5)
GLUCOSE SERPL-MCNC: 113 MG/DL (ref 65–99)
HBA1C MFR BLD: 5.8 % (ref 0–5.6)
HCT VFR BLD AUTO: 47.7 % (ref 42–52)
HDLC SERPL-MCNC: 54 MG/DL
HGB BLD-MCNC: 16 G/DL (ref 14–18)
IMM GRANULOCYTES # BLD AUTO: 0.04 K/UL (ref 0–0.11)
IMM GRANULOCYTES NFR BLD AUTO: 0.4 % (ref 0–0.9)
LDLC SERPL CALC-MCNC: 66 MG/DL
LYMPHOCYTES # BLD AUTO: 2.43 K/UL (ref 1–4.8)
LYMPHOCYTES NFR BLD: 26.4 % (ref 22–41)
MCH RBC QN AUTO: 31.8 PG (ref 27–33)
MCHC RBC AUTO-ENTMCNC: 33.5 G/DL (ref 33.7–35.3)
MCV RBC AUTO: 94.8 FL (ref 81.4–97.8)
MONOCYTES # BLD AUTO: 0.75 K/UL (ref 0–0.85)
MONOCYTES NFR BLD AUTO: 8.1 % (ref 0–13.4)
NEUTROPHILS # BLD AUTO: 5.8 K/UL (ref 1.82–7.42)
NEUTROPHILS NFR BLD: 63.1 % (ref 44–72)
NRBC # BLD AUTO: 0 K/UL
NRBC BLD-RTO: 0 /100 WBC
PLATELET # BLD AUTO: 223 K/UL (ref 164–446)
PMV BLD AUTO: 9.5 FL (ref 9–12.9)
POTASSIUM SERPL-SCNC: 3.5 MMOL/L (ref 3.6–5.5)
PROT SERPL-MCNC: 7.1 G/DL (ref 6–8.2)
RBC # BLD AUTO: 5.03 M/UL (ref 4.7–6.1)
SODIUM SERPL-SCNC: 138 MMOL/L (ref 135–145)
TRIGL SERPL-MCNC: 214 MG/DL (ref 0–149)
TSH SERPL DL<=0.005 MIU/L-ACNC: 2.59 UIU/ML (ref 0.38–5.33)
WBC # BLD AUTO: 9.2 K/UL (ref 4.8–10.8)

## 2019-12-06 PROCEDURE — 84443 ASSAY THYROID STIM HORMONE: CPT

## 2019-12-06 PROCEDURE — 36415 COLL VENOUS BLD VENIPUNCTURE: CPT

## 2019-12-06 PROCEDURE — 80053 COMPREHEN METABOLIC PANEL: CPT

## 2019-12-06 PROCEDURE — 85025 COMPLETE CBC W/AUTO DIFF WBC: CPT

## 2019-12-06 PROCEDURE — 83036 HEMOGLOBIN GLYCOSYLATED A1C: CPT

## 2019-12-06 PROCEDURE — 80061 LIPID PANEL: CPT

## 2019-12-12 ENCOUNTER — OFFICE VISIT (OUTPATIENT)
Dept: MEDICAL GROUP | Facility: MEDICAL CENTER | Age: 74
End: 2019-12-12
Payer: MEDICARE

## 2019-12-12 VITALS
WEIGHT: 283 LBS | TEMPERATURE: 97.3 F | OXYGEN SATURATION: 95 % | BODY MASS INDEX: 40.52 KG/M2 | DIASTOLIC BLOOD PRESSURE: 80 MMHG | HEIGHT: 70 IN | SYSTOLIC BLOOD PRESSURE: 134 MMHG | HEART RATE: 105 BPM

## 2019-12-12 DIAGNOSIS — Z23 NEED FOR VACCINATION FOR PNEUMOCOCCUS: ICD-10-CM

## 2019-12-12 DIAGNOSIS — Z23 NEEDS FLU SHOT: ICD-10-CM

## 2019-12-12 DIAGNOSIS — E66.01 CLASS 3 SEVERE OBESITY DUE TO EXCESS CALORIES WITHOUT SERIOUS COMORBIDITY IN ADULT, UNSPECIFIED BMI (HCC): ICD-10-CM

## 2019-12-12 DIAGNOSIS — Z00.00 PREVENTATIVE HEALTH CARE: Chronic | ICD-10-CM

## 2019-12-12 DIAGNOSIS — Z23 NEED FOR TDAP VACCINATION: ICD-10-CM

## 2019-12-12 DIAGNOSIS — E78.5 DYSLIPIDEMIA: Chronic | ICD-10-CM

## 2019-12-12 PROCEDURE — 99214 OFFICE O/P EST MOD 30 MIN: CPT | Mod: 25 | Performed by: INTERNAL MEDICINE

## 2019-12-12 PROCEDURE — G0008 ADMIN INFLUENZA VIRUS VAC: HCPCS | Performed by: INTERNAL MEDICINE

## 2019-12-12 PROCEDURE — G0009 ADMIN PNEUMOCOCCAL VACCINE: HCPCS | Performed by: INTERNAL MEDICINE

## 2019-12-12 PROCEDURE — 90472 IMMUNIZATION ADMIN EACH ADD: CPT | Performed by: INTERNAL MEDICINE

## 2019-12-12 PROCEDURE — 90715 TDAP VACCINE 7 YRS/> IM: CPT | Performed by: INTERNAL MEDICINE

## 2019-12-12 PROCEDURE — 90732 PPSV23 VACC 2 YRS+ SUBQ/IM: CPT | Performed by: INTERNAL MEDICINE

## 2019-12-12 PROCEDURE — 90662 IIV NO PRSV INCREASED AG IM: CPT | Performed by: INTERNAL MEDICINE

## 2019-12-12 NOTE — PROGRESS NOTES
Subjective:      Julio Gonzalez is a 74 y.o. male who presents with blood test review Follow-Up            HPI     Patient here for follow-up laboratory testing, hypertension followed by cardiology on losartan, Dyazide, amlodipine, potassium supplementation 20 mEq twice daily, impaired glucose metabolism with no previous history of diabetes, tries to keep active, but no regular exercise, tries to minimize sweets and candies, dyslipidemia has been stable on simvastatin with no muscle pain or muscle aches on statin therapy chronic pain followed by Dr. Mathias pain management for chronic low back pain remains on Percocet 10 mg 4 to 5 tablets/day, monitor and refilled by pain management.  Chronic insomnia on Ambien no daytime drowsiness or sedation, no mood changes or depression, on Cymbalta for chronic pain as well, no anxiety or depression mood changes since we last saw him.  Good social support with wife.  No tobacco, no alcohol    Current Outpatient Medications   Medication Sig Dispense Refill   • zolpidem (AMBIEN) 10 MG Tab Take 1 Tab by mouth at bedtime as needed for Sleep for up to 90 days. 90 Each 1   • DULoxetine (CYMBALTA) 60 MG Cap DR Particles delayed-release capsule Take 1 Cap by mouth every day. 90 Cap 2   • losartan (COZAAR) 100 MG Tab Take 1 Tab by mouth every day. 100 Tab 2   • amLODIPine (NORVASC) 10 MG Tab Take 1 Tab by mouth every day. 90 Tab 3   • triamterene/hctz (MAXZIDE-25/DYAZIDE) 37.5-25 MG Cap Take 1 Cap by mouth every day. 90 Cap 3   • simvastatin (ZOCOR) 20 MG Tab Take 1 Tab by mouth every evening. 90 Tab 3   • potassium chloride SA (KDUR) 20 MEQ Tab CR Take 2 Tabs by mouth 2 times a day. 360 Tab 3   • naproxen (NAPROSYN) 500 MG Tab Take 1 Tab by mouth 2 times a day as needed (pain). 180 Tab 3   • Multiple Vitamins-Minerals (MULTI COMPLETE PO) Take  by mouth.     • diphenhydrAMINE (BENADRYL) 25 MG Tab Take 25 mg by mouth every 6 hours as needed for Sleep.     •  Oxycodone-Acetaminophen (PERCOCET-10)  MG Tab Take 1 Tab by mouth every four hours as needed for Moderate Pain. Ok to refill percocet 28-30 days after the percocet written on 5/24/16 is filled 180 Tab 0   • aspirin (ASA) 81 MG Chew Tab chewable tablet Take 1 Tab by mouth every day. 100 Tab 11   • polyethylene glycol/lytes (MIRALAX) PACK Take 17 g by mouth every day.     • docusate sodium (COLACE) 100 MG CAPS Take 100 mg by mouth every day.     • vitamin D (CHOLECALCIFEROL) 1000 UNIT TABS Take 6,000 Units by mouth every day.       No current facility-administered medications for this visit.      Patient Active Problem List   Diagnosis   • S/P hip replacement   • Status post lumbar surgery   • Cervical pain   • Hypertension   • Paroxysmal atrial fibrillation (HCC)   • Dyslipidemia   • History of squamous cell carcinoma   • Preventative health care   • S/P knee replacement   • Obesity   • Plantar fasciitis   • History of stroke   • Insomnia   • Depression, major, in partial remission (HCC)   • Chronic pain   • Arthritis of knee, right   • Opiate dependence, continuous (CMS-HCC)   • Speech and language deficit, late effect of cerebrovascular disease   • S/P Maze operation for atrial fibrillation   • Fibromyalgia   • Impaired glucose metabolism            Health Maintenance Summary                HEPATITIS C SCREENING Overdue 1945     IMM DTaP/Tdap/Td Vaccine Overdue 7/11/1957      Done 8/5/2011 Imm Admin: TD Vaccine    IMM PNEUMOCOCCAL VACCINE: 65+ Years Overdue 8/27/2016      Done 8/27/2015 Imm Admin: Pneumococcal Conjugate Vaccine (Prevnar/PCV-13)     Patient has more history with this topic...    COLONOSCOPY Overdue 4/6/2017      Done 4/6/2007 REFERRAL TO GI FOR COLONOSCOPY     Patient has more history with this topic...    Annual Wellness Visit Overdue 11/30/2017      Done 11/29/2016 Visit Dx: Medicare annual wellness visit, subsequent     Patient has more history with this topic...    IMM ZOSTER VACCINES  "Overdue 4/2/2019      Done 2/5/2019 Imm Admin: Recombinant Zoster Vaccine (RZV) (Shingrix)     Patient has more history with this topic...    IMM INFLUENZA Overdue 9/1/2019      Done 1/8/2019 Imm Admin: Influenza Vaccine Adult HD     Patient has more history with this topic...          Patient Care Team:  Norman Peterson M.D. as PCP - General  Jose Martin Campbell M.D. as Consulting Physician (Anesthesia)  Irineo Skinner M.D. as Consulting Physician (Cardiac Electrophysiology)  Pete Rangel O.D. as Consulting Physician (Optometry)  Reji Roberson as        ROS       Objective:     /80 (BP Location: Right arm, Patient Position: Sitting, BP Cuff Size: Adult)   Pulse (!) 105   Temp 36.3 °C (97.3 °F) (Temporal)   Ht 1.778 m (5' 10\")   Wt (!) 128.4 kg (283 lb)   SpO2 95%   BMI 40.61 kg/m²      Physical Exam  Vitals signs and nursing note reviewed.   Constitutional:       Appearance: Normal appearance.   HENT:      Head: Normocephalic and atraumatic.   Eyes:      General:         Right eye: No discharge.         Left eye: No discharge.   Neck:      Musculoskeletal: Neck supple.   Cardiovascular:      Rate and Rhythm: Normal rate and regular rhythm.      Heart sounds: No murmur.   Pulmonary:      Effort: Pulmonary effort is normal.      Breath sounds: Normal breath sounds.   Abdominal:      General: There is no distension.   Musculoskeletal:         General: No swelling.   Skin:     General: Skin is dry.   Neurological:      General: No focal deficit present.      Mental Status: He is alert.   Psychiatric:         Mood and Affect: Mood normal.         Behavior: Behavior normal.                 Assessment/Plan:       Assessment  #!  Hypertension stable on losartan 100 mg, Dyazide, amlodipine 10 mg, potassium 20 mEq twice daily, blood pressure stable at home, potassium 3.5 slightly decreased but has been increased from previous findings, is taking potassium supplementation twice a " day    #2  Impaired glucose metabolism improved A1c 5.8% December 6 labs    #3 dyslipidemia maintained on Zocor 20 mg, December 6 labs cholesterol 163, triglycerides 214, HDL 54, LDL 66 with no side effect from statin therapy    #4 BMI 40.6    #5 chronic low back pain status post lumbar surgery followed by Dr. Mathias pain management remains on Percocet 10 mg quantity 140 tablets per 30 days    #6 paroxysmal atrial fibrillation status post Maze procedure sinus rhythm on exam      Plan  #1 offered nutrition or dietitian referral, patient declines, understanding obesity increases risk for diabetes and heart disease    #2 flu high dose    #3 tdap     #4 pneumococcal 23    #5 continue check blood pressure record, continue supplementation potassium twice daily no need to change, continue on medications    #6 follow-up cardiology    #7 reviewed test results with him from December 6 no change in medications    #8 encouraged regular activity and exercise if possible understanding limitations of back pain    #9 follow-up pain management    #10 follow-up myself 6 months

## 2020-04-28 DIAGNOSIS — I10 ESSENTIAL HYPERTENSION: ICD-10-CM

## 2020-04-28 DIAGNOSIS — I48.0 PAF (PAROXYSMAL ATRIAL FIBRILLATION) (HCC): ICD-10-CM

## 2020-04-28 RX ORDER — LOSARTAN POTASSIUM 100 MG/1
TABLET ORAL
Qty: 100 TAB | Refills: 0 | Status: SHIPPED | OUTPATIENT
Start: 2020-04-28 | End: 2020-08-05

## 2020-05-18 ENCOUNTER — TELEPHONE (OUTPATIENT)
Dept: MEDICAL GROUP | Facility: MEDICAL CENTER | Age: 75
End: 2020-05-18

## 2020-05-18 ENCOUNTER — PATIENT MESSAGE (OUTPATIENT)
Dept: MEDICAL GROUP | Facility: MEDICAL CENTER | Age: 75
End: 2020-05-18

## 2020-05-19 NOTE — TELEPHONE ENCOUNTER
Please contact the patient, he had a question about downloading the zoom rani for the telemedicine appointment.

## 2020-06-02 DIAGNOSIS — G47.00 INSOMNIA, UNSPECIFIED TYPE: Chronic | ICD-10-CM

## 2020-06-02 RX ORDER — ZOLPIDEM TARTRATE 10 MG/1
10 TABLET ORAL NIGHTLY PRN
Qty: 90 TAB | Refills: 1 | Status: SHIPPED
Start: 2020-06-02 | End: 2020-08-31

## 2020-06-02 NOTE — TELEPHONE ENCOUNTER
Received request via: Pharmacy    Was the patient seen in the last year in this department? Yes 12/12/2019    Does the patient have an active prescription (recently filled or refills available) for medication(s) requested? No

## 2020-06-16 ENCOUNTER — TELEPHONE (OUTPATIENT)
Dept: MEDICAL GROUP | Facility: MEDICAL CENTER | Age: 75
End: 2020-06-16

## 2020-06-16 NOTE — TELEPHONE ENCOUNTER
Pt had a 10:20 VV but I attempted to call him 8 times and his phone said he was not accepting calls.

## 2020-06-22 ENCOUNTER — TELEMEDICINE (OUTPATIENT)
Dept: CARDIOLOGY | Facility: MEDICAL CENTER | Age: 75
End: 2020-06-22
Payer: MEDICARE

## 2020-06-22 VITALS
BODY MASS INDEX: 39.22 KG/M2 | TEMPERATURE: 97 F | DIASTOLIC BLOOD PRESSURE: 88 MMHG | WEIGHT: 274 LBS | HEIGHT: 70 IN | SYSTOLIC BLOOD PRESSURE: 138 MMHG

## 2020-06-22 DIAGNOSIS — I10 ESSENTIAL HYPERTENSION: Chronic | ICD-10-CM

## 2020-06-22 DIAGNOSIS — E78.5 DYSLIPIDEMIA: Chronic | ICD-10-CM

## 2020-06-22 DIAGNOSIS — I69.928 SPEECH AND LANGUAGE DEFICIT, LATE EFFECT OF CEREBROVASCULAR DISEASE: ICD-10-CM

## 2020-06-22 DIAGNOSIS — E66.01 CLASS 3 SEVERE OBESITY DUE TO EXCESS CALORIES WITHOUT SERIOUS COMORBIDITY IN ADULT, UNSPECIFIED BMI (HCC): ICD-10-CM

## 2020-06-22 DIAGNOSIS — I48.0 PAROXYSMAL ATRIAL FIBRILLATION (HCC): Chronic | ICD-10-CM

## 2020-06-22 PROCEDURE — 99214 OFFICE O/P EST MOD 30 MIN: CPT | Mod: 95,CR | Performed by: INTERNAL MEDICINE

## 2020-06-22 ASSESSMENT — FIBROSIS 4 INDEX: FIB4 SCORE: 1.62

## 2020-06-22 NOTE — PROGRESS NOTES
Telemedicine Visit: Established Patient     This encounter was conducted via Zoom .   Verbal consent was obtained. Patient's identity was verified.    Subjective:   CC: Previous history of atrial fibrillation status post ablation and maze  Julio Gonzalez is a 74 y.o. male presenting for evaluation and management of: Previous CVA, atrial fibrillation  and hypertension.  Feels well.  Chronic speech issue related to previous CVA.  Blood pressures been stable at home.  No change in medical history      Denies any recent fevers or chills. No nausea or vomiting. No chest pains or shortness of breath.     Allergies   Allergen Reactions   • Dye Fdc Blue [Brilliant Blue Fcf]      Angiogram contrast sensitivity, became very irritable       Current medicines (including changes today)  Current Outpatient Medications   Medication Sig Dispense Refill   • amLODIPine (NORVASC) 10 MG Tab Take 1 Tab by mouth every day. 100 Tab 0   • triamterene/hctz (MAXZIDE-25/DYAZIDE) 37.5-25 MG Cap Take 1 Cap by mouth every day. 100 Cap 0   • DULoxetine (CYMBALTA) 60 MG Cap DR Particles delayed-release capsule TAKE ONE CAPSULE BY MOUTH DAILY 90 Cap 1   • zolpidem (AMBIEN) 10 MG Tab Take 1 Tab by mouth at bedtime as needed for Sleep for up to 90 days. Indications: Trouble Sleeping 90 Tab 1   • losartan (COZAAR) 100 MG Tab TAKE ONE TABLET BY MOUTH DAILY 100 Tab 0   • naproxen (NAPROSYN) 500 MG Tab TAKE ONE TABLET BY MOUTH TWICE A DAY AS NEEDED FOR PAIN 180 Tab 3   • simvastatin (ZOCOR) 20 MG Tab TAKE ONE TABLET BY MOUTH EVERY EVENING 100 Tab 0   • potassium chloride SA (KDUR) 20 MEQ Tab CR Take 2 Tabs by mouth 2 times a day. 360 Tab 3   • Multiple Vitamins-Minerals (MULTI COMPLETE PO) Take  by mouth.     • diphenhydrAMINE (BENADRYL) 25 MG Tab Take 25 mg by mouth every 6 hours as needed for Sleep.     • Oxycodone-Acetaminophen (PERCOCET-10)  MG Tab Take 1 Tab by mouth every four hours as needed for Moderate Pain. Ok to refill percocet  28-30 days after the percocet written on 5/24/16 is filled 180 Tab 0   • aspirin (ASA) 81 MG Chew Tab chewable tablet Take 1 Tab by mouth every day. 100 Tab 11   • polyethylene glycol/lytes (MIRALAX) PACK Take 17 g by mouth every day.     • docusate sodium (COLACE) 100 MG CAPS Take 100 mg by mouth every day.     • vitamin D (CHOLECALCIFEROL) 1000 UNIT TABS Take 6,000 Units by mouth every day.       No current facility-administered medications for this visit.        Patient Active Problem List    Diagnosis Date Noted   • Speech and language deficit, late effect of cerebrovascular disease 05/20/2015     Priority: Low   • Impaired glucose metabolism 11/08/2018   • Fibromyalgia 03/26/2016   • S/P Maze operation for atrial fibrillation 11/18/2015   • Opiate dependence, continuous (CMS-[facility]) 05/20/2015   • Arthritis of knee, right 09/03/2014   • Depression, major, in partial remission ([facility]) 12/06/2013   • Chronic pain 12/06/2013   • Insomnia 02/08/2013   • History of stroke 08/05/2011   • Plantar fasciitis 11/02/2010   • Obesity 09/17/2009   • S/P hip replacement 09/16/2009   • Status post lumbar surgery 09/16/2009   • Cervical pain 09/16/2009   • Hypertension 09/16/2009   • Paroxysmal atrial fibrillation (HCC) 09/16/2009   • Dyslipidemia 09/16/2009   • History of squamous cell carcinoma 09/16/2009   • Preventative health care 09/16/2009   • S/P knee replacement 09/16/2009       Family History   Problem Relation Age of Onset   • Heart Disease Brother         atrial fib   • Cancer Sister         breast cancer   • Heart Disease Unknown    • Hypertension Unknown    • Stroke Unknown        He  has a past medical history of Aphasia, Arrhythmia, Arthritis, CAD (coronary artery disease), Hypercholesteremia, Hypercholesterolemia (2/13/2012), Hypertension, MEDICAL HOME, Pain, Stroke (HCC) (1999), and Unspecified cerebral artery occlusion without mention of cerebral infarction (2/13/2012).  He  has a past surgical history that  "includes hip replacement, total (2002/2004); other cardiac surgery (2000); other abdominal surgery (1981); knee replacement, total (2001); vein ligation (1987); other (1994); other (1999); block epidural steroid injection (2001); colonoscopy (2001/2007); angiogram (1999); eswt (12/9/2009); pr inj dx/ther agnt paravert facet joint, jessica* (11/4/2011); pr inj dx/ther agnt paravert facet joint, ce* (11/4/2011); maze procedure; lumbar fusion posterior (9/26/2012); lumbar decompression (9/26/2012); maze procedure; and knee replacement, total (2002).       Objective:   /88 (BP Location: Left arm, Patient Position: Sitting, BP Cuff Size: Adult)   Temp 36.1 °C (97 °F) (Oral)   Ht 1.778 m (5' 10\")   Wt 124.3 kg (274 lb)   BMI 39.31 kg/m²         Assessment and Plan:   The following treatment plan was discussed:     1. Paroxysmal atrial fibrillation (HCC)    2. Speech and language deficit, late effect of cerebrovascular disease    3. Class 3 severe obesity due to excess calories without serious comorbidity in adult, unspecified BMI (HCC)    4. Dyslipidemia    5. Essential hypertension  1.  Atrial fibrillation status post maze no recurrence.  2.  Hypertension continue current regimen.  3.  Obesity work on weight loss.  4.  Previous stroke.   5.  Follow-up in 6 months.             "

## 2020-06-22 NOTE — LETTER
[facility]   [address], [address]  [place] [address]  Phone: [phone]  Fax: [phone]              Julio Gonzalez  1945    Encounter Date: 6/22/2020    Irineo Skinner M.D.          PROGRESS NOTE:  Telemedicine Visit: Established Patient     This encounter was conducted via Zoom .   Verbal consent was obtained. Patient's identity was verified.    Subjective:   CC: Previous history of atrial fibrillation status post ablation and maze  Julio Gonzalez is a 74 y.o. male presenting for evaluation and management of: Previous CVA and atrial fibrillation and hypertension.  Feels well.  Chronic speech issue related to previous CVA.  Blood pressures been stable at home.  No change in medical history      Denies any recent fevers or chills. No nausea or vomiting. No chest pains or shortness of breath.     Allergies   Allergen Reactions   • Dye Fdc Blue [Brilliant Blue Fcf]      Angiogram contrast sensitivity, became very irritable       Current medicines (including changes today)  Current Outpatient Medications   Medication Sig Dispense Refill   • amLODIPine (NORVASC) 10 MG Tab Take 1 Tab by mouth every day. 100 Tab 0   • triamterene/hctz (MAXZIDE-25/DYAZIDE) 37.5-25 MG Cap Take 1 Cap by mouth every day. 100 Cap 0   • DULoxetine (CYMBALTA) 60 MG Cap DR Particles delayed-release capsule TAKE ONE CAPSULE BY MOUTH DAILY 90 Cap 1   • zolpidem (AMBIEN) 10 MG Tab Take 1 Tab by mouth at bedtime as needed for Sleep for up to 90 days. Indications: Trouble Sleeping 90 Tab 1   • losartan (COZAAR) 100 MG Tab TAKE ONE TABLET BY MOUTH DAILY 100 Tab 0   • naproxen (NAPROSYN) 500 MG Tab TAKE ONE TABLET BY MOUTH TWICE A DAY AS NEEDED FOR PAIN 180 Tab 3   • simvastatin (ZOCOR) 20 MG Tab TAKE ONE TABLET BY MOUTH EVERY EVENING 100 Tab 0   • potassium chloride SA (KDUR) 20 MEQ Tab CR Take 2 Tabs by mouth 2 times a day. 360 Tab 3   • Multiple Vitamins-Minerals (MULTI  COMPLETE PO) Take  by mouth.     • diphenhydrAMINE (BENADRYL) 25 MG Tab Take 25 mg by mouth every 6 hours as needed for Sleep.     • Oxycodone-Acetaminophen (PERCOCET-10)  MG Tab Take 1 Tab by mouth every four hours as needed for Moderate Pain. Ok to refill percocet 28-30 days after the percocet written on 5/24/16 is filled 180 Tab 0   • aspirin (ASA) 81 MG Chew Tab chewable tablet Take 1 Tab by mouth every day. 100 Tab 11   • polyethylene glycol/lytes (MIRALAX) PACK Take 17 g by mouth every day.     • docusate sodium (COLACE) 100 MG CAPS Take 100 mg by mouth every day.     • vitamin D (CHOLECALCIFEROL) 1000 UNIT TABS Take 6,000 Units by mouth every day.       No current facility-administered medications for this visit.        Patient Active Problem List    Diagnosis Date Noted   • Speech and language deficit, late effect of cerebrovascular disease 05/20/2015     Priority: Low   • Impaired glucose metabolism 11/08/2018   • Fibromyalgia 03/26/2016   • S/P Maze operation for atrial fibrillation 11/18/2015   • Opiate dependence, continuous (CMS-[facility]) 05/20/2015   • Arthritis of knee, right 09/03/2014   • Depression, major, in partial remission ([facility]) 12/06/2013   • Chronic pain 12/06/2013   • Insomnia 02/08/2013   • History of stroke 08/05/2011   • Plantar fasciitis 11/02/2010   • Obesity 09/17/2009   • S/P hip replacement 09/16/2009   • Status post lumbar surgery 09/16/2009   • Cervical pain 09/16/2009   • Hypertension 09/16/2009   • Paroxysmal atrial fibrillation (HCC) 09/16/2009   • Dyslipidemia 09/16/2009   • History of squamous cell carcinoma 09/16/2009   • Preventative health care 09/16/2009   • S/P knee replacement 09/16/2009       Family History   Problem Relation Age of Onset   • Heart Disease Brother         atrial fib   • Cancer Sister         breast cancer   • Heart Disease Unknown    • Hypertension Unknown    • Stroke Unknown        He  has a past medical history of Aphasia, Arrhythmia, Arthritis, CAD  "(coronary artery disease), Hypercholesteremia, Hypercholesterolemia (2/13/2012), Hypertension, MEDICAL HOME, Pain, Stroke (HCC) (1999), and Unspecified cerebral artery occlusion without mention of cerebral infarction (2/13/2012).  He  has a past surgical history that includes hip replacement, total (2002/2004); other cardiac surgery (2000); other abdominal surgery (1981); knee replacement, total (2001); vein ligation (1987); other (1994); other (1999); block epidural steroid injection (2001); colonoscopy (2001/2007); angiogram (1999); eswt (12/9/2009); pr inj dx/ther agnt paravert facet joint, jessica* (11/4/2011); pr inj dx/ther agnt paravert facet joint, ce* (11/4/2011); maze procedure; lumbar fusion posterior (9/26/2012); lumbar decompression (9/26/2012); maze procedure; and knee replacement, total (2002).       Objective:   /88 (BP Location: Left arm, Patient Position: Sitting, BP Cuff Size: Adult)   Temp 36.1 °C (97 °F) (Oral)   Ht 1.778 m (5' 10\")   Wt 124.3 kg (274 lb)   BMI 39.31 kg/m²         Assessment and Plan:   The following treatment plan was discussed:     1. Paroxysmal atrial fibrillation (HCC)    2. Speech and language deficit, late effect of cerebrovascular disease    3. Class 3 severe obesity due to excess calories without serious comorbidity in adult, unspecified BMI (HCC)    4. Dyslipidemia    5. Essential hypertension  1.  Atrial fibrillation status post maze no recurrence.  2.  Hypertension continue current regimen.  3.  Obesity work on weight loss.  4.  Previous stroke.   5.  Follow-up in 6 months.                 Norman Peterson M.D.  [address]  [address]  [place] [address]  VIA In Basket              "

## 2020-06-23 DIAGNOSIS — E78.5 DYSLIPIDEMIA: ICD-10-CM

## 2020-06-24 RX ORDER — SIMVASTATIN 20 MG
20 TABLET ORAL EVERY EVENING
Qty: 100 TAB | Refills: 3 | Status: SHIPPED | OUTPATIENT
Start: 2020-06-24 | End: 2021-05-24

## 2020-07-07 ENCOUNTER — HOSPITAL ENCOUNTER (OUTPATIENT)
Facility: MEDICAL CENTER | Age: 75
End: 2020-07-07
Payer: MEDICARE

## 2020-07-07 PROCEDURE — 82274 ASSAY TEST FOR BLOOD FECAL: CPT

## 2020-07-08 ENCOUNTER — PATIENT OUTREACH (OUTPATIENT)
Dept: HEALTH INFORMATION MANAGEMENT | Facility: OTHER | Age: 75
End: 2020-07-08

## 2020-07-08 NOTE — PROGRESS NOTES
Called member to wish a happy earlier birthday. The member was still sleeping so his wife took the message. If the member calls in, please transfer to [phone]

## 2020-07-14 ENCOUNTER — TELEPHONE (OUTPATIENT)
Dept: MEDICAL GROUP | Facility: MEDICAL CENTER | Age: 75
End: 2020-07-14

## 2020-07-14 LAB — HEMOCCULT STL QL IA: NEGATIVE

## 2020-07-20 ENCOUNTER — TELEMEDICINE (OUTPATIENT)
Dept: MEDICAL GROUP | Facility: MEDICAL CENTER | Age: 75
End: 2020-07-20
Payer: MEDICARE

## 2020-07-20 VITALS
TEMPERATURE: 97.8 F | SYSTOLIC BLOOD PRESSURE: 132 MMHG | WEIGHT: 271 LBS | DIASTOLIC BLOOD PRESSURE: 80 MMHG | HEIGHT: 71 IN | BODY MASS INDEX: 37.94 KG/M2

## 2020-07-20 DIAGNOSIS — Z98.890 STATUS POST LUMBAR SURGERY: Chronic | ICD-10-CM

## 2020-07-20 DIAGNOSIS — I48.0 PAROXYSMAL ATRIAL FIBRILLATION (HCC): Chronic | ICD-10-CM

## 2020-07-20 DIAGNOSIS — E66.01 CLASS 3 SEVERE OBESITY DUE TO EXCESS CALORIES WITHOUT SERIOUS COMORBIDITY IN ADULT, UNSPECIFIED BMI (HCC): ICD-10-CM

## 2020-07-20 DIAGNOSIS — G89.29 OTHER CHRONIC PAIN: ICD-10-CM

## 2020-07-20 DIAGNOSIS — G47.00 INSOMNIA, UNSPECIFIED TYPE: Chronic | ICD-10-CM

## 2020-07-20 PROCEDURE — 99214 OFFICE O/P EST MOD 30 MIN: CPT | Mod: 95,CR | Performed by: INTERNAL MEDICINE

## 2020-07-20 ASSESSMENT — FIBROSIS 4 INDEX: FIB4 SCORE: 1.64

## 2020-07-20 NOTE — PROGRESS NOTES
Subjective:      Julio Gonzalez is a 75 y.o. male who presents with Follow-Up  Blood pressure        Telemedicine Visit: Established Patient     This encounter was conducted via zoom  Verbal consent was obtained. Patient's identity was verified.    Subjective:   CC: Hypertension  Julio Gonzalez is a 75 y.o. male presenting for evaluation and management of: Hypertension  Telemedicine appointment with wife  Medications, allergies, medical history, surgical history, social history, family history reviewed and updated  Recently saw cardiology 6/22/20 cardiology note paroxysmal atrial fibrillation stable, follow-up 6 months   Hypertension stable and controlled on losartan 100 mg, Dyazide, Norvasc 10 mg, potassium 20 mEq twice daily and that runs normally 142/84,132/84 and 132/80  Chronic pain status post lumbar surgery, has seen neurosurgery, physical therapy, currently sees Dr. Mathias from pain management on Percocet 10 mg 5/day, Cymbalta 60 mg, Naprosyn as needed, no side effects with Percocet.  Practicing social distancing with COVID pandemic, does not leave the house, wife does shopping if necessary, no covert exposures  Denies anxiety or depression related to distancing and staying at home  Chronic insomnia on Ambien no drowsiness, sedation, memory loss related to medication  No alcohol or tobacco  No regular exercise  Has been able to lose some weight by watching his diet, limiting sweets and candies    Allergies   Allergen Reactions   • Dye Fdc Blue [Brilliant Blue [facility]]      Angiogram contrast sensitivity, became very irritable       Status post lumbar surgery  2/01 MRI lumbar spine DJD L5-S1 anterolisthesis 4-5 mm marked stenosis saw  neurosurgery  9/09  neurosurgery note  12/09 neurosurgery note, severe left-sided foraminal stenosis, L5-S1 spondylolisthesis 4-5 mm recommend surgery, patient declined, has had epidural with no benefit,tried lyrica and neurontin before, declines  cymbalta trial  4/10 note dr. johnson neurosurgery who gives patient norco, he is retiring, and has offered patient surgical therapy versus continuing norco 10 mg which we'll assume dispensing.  10/11 MRI lumbar spine grade 1 spondylolisthesis 9mm, bilateral spondylosis L5, moderate bilateral L5 stenosis, 2.4 cm left kidney cyst  10/11 xray LS, grade 1 spondylolisthesis L5 on S1 increased from prior exam, 1.3 cm, no significant change in flexion  11/11  pain note right L5-S1 transforaminal epidural  1/16/12 start cymbalta trial (3/12 stop cymbalta no benefit)  1/12 dr. murphy neurosurgery note surgical intervention offered L4 to sacrum decompression  3/26/12 restarted cymbalta    9/26/12  neurosurgery operative note decompression at L5-S1and bilateral foraminotomies,transforaminal lumbar interbody fusion, L4-L5 and L5-S1, with expandable cage 8-12 mm.  2/20/13 dr. murphy neurosurgery note, lumbar films, EMG NCS lower ext inconclusive, repeat MRI lumbar spine pending  3/13/13 MRI lumbar spine postoperative changes L4-S1 lumbar laminectomy, interbody fusion, disc space insert L4-L5 L5-S1, posterior spinal fusion and fixation, L5-S1 moderate right foraminal stenosis. 2.4 cm mid right renal cyst unchanged  3/26/13 dr. muprhy neurosurgery note, no further surgical intervention necessary, chronic narcotics norco 10 mg 5 per day; I will assume the norco rx from   1/25/15 off naprosyn and cymbalta; on norco 10 mg one every four hours #150  5/21/15 on percocet 10 mg five per day and on cymbalta 60 mg  3/25/16 start lyrica 50 mg tid for fibromyalgia, continue percocet 10 mg 5 per day and cymbalta 60 mg  4/12/16 increase lyrica to 100 mg tid (50 mg two tid), continue cymbalta 60 mg and percocet 10 mg 5 per day  9/26/16 off lyrica, on percocet 10 mg qid per  pain management and cymbalta  7/9/19 on percocet 10 mg #140 per 28 days from  pain management also on cymbalta 60 mg and  naprosyn 500 mg bid     Speech language deficit  1999 affected right arm, no old records  Some diff with short recall, and occasional diff with expressing self when fatigued     Status post knee replacement left 2001     Status post hip replacement bilateral  12/03 right total hip replacement  1/04 left total hip replacement      Preventative health  4/6/07 colonoscopy [facility] negative  10/26/10 zoster vaccine  8/27/15 prevnar at [facility]  11/8/18 vit d 46  11/8/18 psa 1.4  12/12/19 tdap  12/12/19 pneumovax  7/14/20 FIT negative     Paroxysmal atrial fibrillation  Sees [facility]  2005 s/p maze procedure with a stroke related to that as a complication, have no records at all regarding that, no CT scan or MRI scan and probably had a cardiac catheterization by renal physicians in 2005 that was negative for  Coronary artery disease.  8/11 [facility] note EKG NSR  5/12 [facility] note, on asa daily  11/12 [facility] note  4/29/13 cardiology note  11/3/13 cardiology note on asa  9/22/14 cardiology note sinus, follow up 6 months  5/12/15  cardiology note, paroxysmal atrial fibrillation status post maze operation, sinus rhythm  11/18/15 cardiology note stable follow one year  4/4/17  cardiology note, paroxysmal atrial fibrillation no recurrence, follow-up one year  2/5/19 cardiology note paroxysmal atrial fibrillation, sinus on exam, no recurrence of atrial fibrillation status post maze, follow-up 1 year  6/16/20 cardiology note atrial fibrillation status post maze no recurrence, hypertension stable on current regimen, work on weight loss, follow-up 6 months    opitate rx  3/26/13 dr. murphy neurosurgery note, no further surgical intervention necessary, chronic narcotics norco 10 mg 5 per day; I will assume the norco rx from   12/6/13 change from hydrocodone 10 mg 4-5 tablets per day to oxycodone 10 mg 4-5 tabs per day  1/22/15 UDT millenium consistent  2/24/15 change from hydrocodone 10 mg to oxycodone 10 mg (percocet)  qid #150 per 30 days  7/9/19 on percocet 10 mg #140 per 28 days from  pain management also on cymbalta 60 mg and naprosyn 500 mg bid     Insomnia  2009 on ambien 10 mg  2/19/16 unable to taper ambien, referral to behavioral sleep psychology recommended he declines  9/26/16 on ambien 10 mg work on taper, declines referral to sleep psychology  7/9/18 on ambien declines sleep management referral     Impaired glucose metabolism  11/8/18 A1c 6%  12/6/19 A1c 5.8%      Hypertension  7/10 [facility] note change from hyzaar to enalapril hct 10/25 1/11 [facility] note bp not controlled on enalapril hct 10/25, change back to losartan hct 100/25 and norvasc 10 mg  6/12 K+ 3.0, increase kdur to 20 tid, consider decreasing hctz if possible to decrease K+ supplementation  12/12 cozaar 100 mg, dyazide 37.5/25 mg, kcl 20 meq, norvasc 10 mg  1/29/14 on cozaar 100 mg, dyazide 37.5/25 mg, norvasc 10 mg, klor 20 meq bid; will increase to klor 20 meq tid  9/3//14 K+ 3.1 on klor 20 meq tid, increase to 20 meq two tab bid  9/3/14 urine mac <0.5 on cozaar 100 mg, dyazide 37.5/25 mg, norvasc 10 mg, klor 20 meq two tab bid  6/30/15 Na 132,K+ 3.5 on cozaar 100 mg, dyazide 37.5/25 mg, norvasc 10 mg, klor 20 meq two tab bid  8/31/16 K+ 3.3 on cozaar 100 mg, dyazide 37.5/25 mg, norvasc 10 mg, klor 20 meq two tab qday will increase to 2 bid  7/11/17 K+3.3 on cozaar 100 mg, dyazide 37.5/25 mg, norvasc 10 mg, klor 20 meq two bid  2/5/19 cardiology note paroxysmal atrial fibrillation, sinus on exam, no recurrence of atrial fibrillation status post maze, follow-up 1 year  12/6/19 K+3.5 on cozaar 100 mg, dyazide 37.5/25 mg, norvasc 10 mg, klor 20 meq two bid  6/16/20 cardiology note atrial fibrillation status post maze no recurrence, hypertension stable on current regimen, work on weight loss, follow-up 6 months     History stroke  1999 affected right arm, no old records  Some difficulty with short recall, and occasional diff with expressing self when  fatigued     History squamous cell carcinoma  Saw  dermatology offered aldara he declined  3/27/17  dermatology note  5/1/17  dermatology biopsy proven squamous cell carcinoma in situ left forehead, here for mohs  9/19/17  dermatology note continue      Fibromyalgia  3/25/16 start lyrica 50 mg tid for fibromyalgia, continue percocet 10 mg 5 per day and cymbalta 60 mg  4/12/16 increase lyrica to 100 mg tid (50 mg two tid), continue cymbalta 60 mg and percocet 10 mg 5 per day  4/28/16 increase lyrica to 150 mg tid, continue cymbalta 60 mg and percocet 10 mg five times per day  5/10/16 on lyrica 50 mg three tabs tid, change to 100 mg am, 150 mg noon, 100 mg pm     Dyslipidemia  Followed by cardiology  2/09 cholesterol 135, triglycerides 100, HDL 47, LDL 68  8/09 chol 138,trig 101,hdl 49,ldl 69  3/10 chol 123,trig 66,hdl 49,ldl 61  7/10 [facility] note change vytorin to zocor 40  11/10 chol 141,trig 75,hdl 62,ldl 64 on vytorin 10/20 mg per [facility]  10/11 chol 159,trig 102,hdl 52,ldl 87 on vytorin 10/20 per [facility]  2/29/12 stop vytorin, change to zocor 20 mg per [facility]  6/12 chol 152,trig 102,hdl 49,ldl 83 on zocor 20 mg  6/13 chol 150,trig 130,hdl 52,ldl 72 on zocor 20 mg  1/29/14 chol 147,trig 127,hdl 50,ldl 72 on zocor 20 mg  9/3/14 chol 169,trig 228,hdl 49,ldl 74 on zocor 20 mg  6/30/15 chol 151,trig 88,hdl 63,ldl 70 on zocor 20 mg  8/31/16 chol 169,trig 130,hdl 59,ldl 84 on zocor 20 mg  7/11/17 chol 147,trig 146,hdl 51,ldl 67 on zocor 20 mg  11/8/18 chol 189,trig 215,hdl 54,ldl 92 on zocor 20 mg  12/6/19 chol 163,trig 214,hdl 54,ldl 66 on zocor 20 mg     Depression  3/12 tried cymalta for pain no benefit  12/6/13 PHQ 9 score 15, start cymbalta samples 30 mg x 7 days, then 60 mg  12/20/13 cymbalta 60 mg  1/22/15 off cymbalta    5/21/15 on cymbalta 60 mg  2/19/16 referral to behavioral health recommended he declines  11/29/16 depression screening score 0, continues on cymbalta  1/30/18  depression screening score 0 on cymbalta     Chronic opiate  3/26/13 dr. murphy neurosurgery note, no further surgical intervention necessary, chronic narcotics norco 10 mg 5 per day; I will assume the norco rx from   12/6/13 change from hydrocodone 10 mg 4-5 tablets per day to oxycodone 10 mg 4-5 tabs per day  1/22/15 UDT millenium consistent  2/24/15 change from hydrocodone 10 mg to oxycodone 10 mg (percocet) qid #150 per 30 days  1/22/14 norco 10 mg #180, millenium URT done  2/24/15 change to percocet 10 mg #150 only one prescription provided, if successful for pain relief call us and I will write next 3 refills that he can  and then followup in office after that  5/21/15 percocet 10 mg #150 refill  5/21/15 narcotic contract  5/21/15 opiate risk score 1  8/24/15 refill percocet #150 x 3  8/24/15 referral pain management  10/4/15   2/19/16   2/19/16 increase frequency to percocet 10 mg q 4 hours #180 per month, declined trial of MS contin, because of increasing utilization, referral made to pain management   5/24/16   5/24/16 UDT millennium  5/24/16 refill percocet #150 x 2 refer to  pain management to assume opiate prescription writing   6/22/16  pain management note, initiate pregabalin, work on taper oxycodone  7/6/16  continue oxycodone and pregabalin  8/9/16  pain management note continue oxycodone, consider ultrasound-guided knee injections to right  9/12/16  pain management note, UDT consistent  7/9/19 on percocet 10 mg #140 per 28 days from  pain management also on cymbalta 60 mg and naprosyn 500 mg bid  7/9/19 referral [facility]      Cervical pain  3/06 MRI cervical spine moderate subdural frontal stenosis C5-C6 lesser extent C6-C7     Arthritis knee  9/3/14 x-ray right knee marked tricompartmental osteoarthritis, most severe medial compartment  2/19/16 has seen  orthopedics, declines knee replacement              .       Status post lumbar surgery  2/01 MRI lumbar spine DJD L5-S1 anterolisthesis 4-5 mm marked stenosis saw  neurosurgery  9/09  neurosurgery note  12/09 neurosurgery note, severe left-sided foraminal stenosis, L5-S1 spondylolisthesis 4-5 mm recommend surgery, patient declined, has had epidural with no benefit,tried lyrica and neurontin before, declines cymbalta trial  4/10 note dr. johnson neurosurgery who gives patient norco, he is retiring, and has offered patient surgical therapy versus continuing norco 10 mg which we'll assume dispensing.  10/11 MRI lumbar spine grade 1 spondylolisthesis 9mm, bilateral spondylosis L5, moderate bilateral L5 stenosis, 2.4 cm left kidney cyst  10/11 xray LS, grade 1 spondylolisthesis L5 on S1 increased from prior exam, 1.3 cm, no significant change in flexion  11/11  pain note right L5-S1 transforaminal epidural  1/16/12 start cymbalta trial (3/12 stop cymbalta no benefit)  1/12 dr. murphy neurosurgery note surgical intervention offered L4 to sacrum decompression  3/26/12 restarted cymbalta    9/26/12  neurosurgery operative note decompression at L5-S1and bilateral foraminotomies,transforaminal lumbar interbody fusion, L4-L5 and L5-S1, with expandable cage 8-12 mm.  2/20/13 dr. murphy neurosurgery note, lumbar films, EMG NCS lower ext inconclusive, repeat MRI lumbar spine pending  3/13/13 MRI lumbar spine postoperative changes L4-S1 lumbar laminectomy, interbody fusion, disc space insert L4-L5 L5-S1, posterior spinal fusion and fixation, L5-S1 moderate right foraminal stenosis. 2.4 cm mid right renal cyst unchanged  3/26/13 dr. murphy neurosurgery note, no further surgical intervention necessary, chronic narcotics norco 10 mg 5 per day; I will assume the norco rx from   1/25/15 off naprosyn and cymbalta; on norco 10 mg one every four hours #150  5/21/15 on percocet 10 mg five per day and on cymbalta 60 mg  3/25/16 start  lyrica 50 mg tid for fibromyalgia, continue percocet 10 mg 5 per day and cymbalta 60 mg  4/12/16 increase lyrica to 100 mg tid (50 mg two tid), continue cymbalta 60 mg and percocet 10 mg 5 per day  9/26/16 off lyrica, on percocet 10 mg qid per  pain management and cymbalta     Speech language deficit  1999 affected right arm, no old records  Some diff with short recall, and occasional diff with expressing self when fatigued     Status post knee replacement left 2001     Status post hip replacement bilateral  12/03 right total hip replacement  1/04 left total hip replacement      Preventative health  4/6/07 colonoscopy [facility] negative  10/26/10 zoster vaccine  8/27/15 prevnar at [facility]  9/29/17 declines colonoscopy,FIT card provided  rinted  11/8/18 vit d 46  11/8/18 psa 1.4  1/4/19 FIT negative  12/12/19 tdap  12/12/19 pneumovax     Paroxysmal atrial fibrillation  Sees [facility]  2005 s/p maze procedure with a stroke related to that as a complication, have no records at all regarding that, no CT scan or MRI scan and probably had a cardiac catheterization by renal physicians in 2005 that was negative for  Coronary artery disease.  8/11 [facility] note EKG NSR  5/12 [facility] note, on asa daily  11/12 [facility] note  4/29/13 cardiology note  11/3/13 cardiology note on asa  9/22/14 cardiology note sinus, follow up 6 months  5/12/15  cardiology note, paroxysmal atrial fibrillation status post maze operation, sinus rhythm  11/18/15 cardiology note stable follow one year  4/4/17  cardiology note, paroxysmal atrial fibrillation no recurrence, follow-up one year  2/5/19 cardiology note paroxysmal atrial fibrillation, sinus on exam, no recurrence of atrial fibrillation status post maze, follow-up 1 year  6/16/20 cardiology note atrial fibrillation status post maze no recurrence, hypertension stable on current regimen, work on weight loss, follow-up 6 months     Insomnia  2009 on ambien 10 mg  2/19/16 unable to taper  ambien, referral to behavioral sleep psychology recommended he declines  9/26/16 on ambien 10 mg work on taper, declines referral to sleep psychology  7/9/18 on ambien declines sleep management referral     Impaired glucose metabolism  11/8/18 A1c 6%     Hypertension  7/10 [facility] note change from hyzaar to enalapril hct 10/25  1/11 [facility] note bp not controlled on enalapril hct 10/25, change back to losartan hct 100/25 and norvasc 10 mg  6/12 K+ 3.0, increase kdur to 20 tid, consider decreasing hctz if possible to decrease K+ supplementation  12/12 cozaar 100 mg, dyazide 37.5/25 mg, kcl 20 meq, norvasc 10 mg  1/29/14 on cozaar 100 mg, dyazide 37.5/25 mg, norvasc 10 mg, klor 20 meq bid; will increase to klor 20 meq tid  9/3//14 K+ 3.1 on klor 20 meq tid, increase to 20 meq two tab bid  9/3/14 urine mac <0.5 on cozaar 100 mg, dyazide 37.5/25 mg, norvasc 10 mg, klor 20 meq two tab bid  6/30/15 Na 132,K+ 3.5 on cozaar 100 mg, dyazide 37.5/25 mg, norvasc 10 mg, klor 20 meq two tab bid  8/31/16 K+ 3.3 on cozaar 100 mg, dyazide 37.5/25 mg, norvasc 10 mg, klor 20 meq two tab qday will increase to 2 bid  7/11/17 K+3.3 on cozaar 100 mg, dyazide 37.5/25 mg, norvasc 10 mg, klor 20 meq two bid  2/5/19 cardiology note paroxysmal atrial fibrillation, sinus on exam, no recurrence of atrial fibrillation status post maze, follow-up 1 year  12/6/19 K+3.5 on cozaar 100 mg, dyazide 37.5/25 mg, norvasc 10 mg, klor 20 meq two bid  6/16/20 cardiology note atrial fibrillation status post maze no recurrence, hypertension stable on current regimen, work on weight loss, follow-up 6 months     History stroke  1999 affected right arm, no old records  Some difficulty with short recall, and occasional diff with expressing self when fatigued     History squamous cell carcinoma  Saw  dermatology offered aldara he declined  3/27/17  dermatology note  5/1/17  dermatology biopsy proven squamous cell carcinoma in situ left  forehead, here for mohs  9/19/17  dermatology note continue      Fibromyalgia  3/25/16 start lyrica 50 mg tid for fibromyalgia, continue percocet 10 mg 5 per day and cymbalta 60 mg  4/12/16 increase lyrica to 100 mg tid (50 mg two tid), continue cymbalta 60 mg and percocet 10 mg 5 per day  4/28/16 increase lyrica to 150 mg tid, continue cymbalta 60 mg and percocet 10 mg five times per day  5/10/16 on lyrica 50 mg three tabs tid, change to 100 mg am, 150 mg noon, 100 mg pm     Dyslipidemia  Followed by cardiology  2/09 cholesterol 135, triglycerides 100, HDL 47, LDL 68  8/09 chol 138,trig 101,hdl 49,ldl 69  3/10 chol 123,trig 66,hdl 49,ldl 61  7/10 [facility] note change vytorin to zocor 40  11/10 chol 141,trig 75,hdl 62,ldl 64 on vytorin 10/20 mg per [facility]  10/11 chol 159,trig 102,hdl 52,ldl 87 on vytorin 10/20 per [facility]  2/29/12 stop vytorin, change to zocor 20 mg per [facility]  6/12 chol 152,trig 102,hdl 49,ldl 83 on zocor 20 mg  6/13 chol 150,trig 130,hdl 52,ldl 72 on zocor 20 mg  1/29/14 chol 147,trig 127,hdl 50,ldl 72 on zocor 20 mg  9/3/14 chol 169,trig 228,hdl 49,ldl 74 on zocor 20 mg  6/30/15 chol 151,trig 88,hdl 63,ldl 70 on zocor 20 mg  8/31/16 chol 169,trig 130,hdl 59,ldl 84 on zocor 20 mg  7/11/17 chol 147,trig 146,hdl 51,ldl 67 on zocor 20 mg  11/8/18 chol 189,trig 215,hdl 54,ldl 92 on zocor 20 mg  12/6/19 chol 163,trig 214,hdl 54,ldl 66 on zocor 20 mg     Depression  3/12 tried cymalta for pain no benefit  12/6/13 PHQ 9 score 15, start cymbalta samples 30 mg x 7 days, then 60 mg  12/20/13 cymbalta 60 mg  1/22/15 off cymbalta    5/21/15 on cymbalta 60 mg  2/19/16 referral to behavioral health recommended he declines  11/29/16 depression screening score 0, continues on cymbalta  1/30/18 depression screening score 0 on cymbalta     Chronic opiate  3/26/13 dr. murphy neurosurgery note, no further surgical intervention necessary, chronic narcotics norco 10 mg 5 per day; I will assume the norco rx from    12/6/13 change from hydrocodone 10 mg 4-5 tablets per day to oxycodone 10 mg 4-5 tabs per day  1/22/15 UDT millenium consistent  2/24/15 change from hydrocodone 10 mg to oxycodone 10 mg (percocet) qid #150 per 30 days  1/22/14 norco 10 mg #180, millenium URT done  2/24/15 change to percocet 10 mg #150 only one prescription provided, if successful for pain relief call us and I will write next 3 refills that he can  and then followup in office after that  5/21/15 percocet 10 mg #150 refill  5/21/15 narcotic contract  5/21/15 opiate risk score 1  8/24/15 refill percocet #150 x 3  8/24/15 referral pain management  10/4/15   2/19/16   2/19/16 increase frequency to percocet 10 mg q 4 hours #180 per month, declined trial of MS contin, because of increasing utilization, referral made to pain management   5/24/16   5/24/16 UDT millennium  5/24/16 refill percocet #150 x 2 refer to  pain management to assume opiate prescription writing   6/22/16  pain management note, initiate pregabalin, work on taper oxycodone  7/6/16  continue oxycodone and pregabalin  8/9/16  pain management note continue oxycodone, consider ultrasound-guided knee injections to right  9/12/16  pain management note, UDT consistent  7/9/19 on percocet 10 mg #140 per 28 days from  pain management also on cymbalta 60 mg and naprosyn 500 mg bid  7/9/19 referral [facility]      Cervical pain  3/06 MRI cervical spine moderate subdural frontal stenosis C5-C6 lesser extent C6-C7     Arthritis knee  9/3/14 x-ray right knee marked tricompartmental osteoarthritis, most severe medial compartment  2/19/16 has seen  orthopedics, declines knee replacement                                           Status post lumbar surgery  2/01 MRI lumbar spine DJD L5-S1 anterolisthesis 4-5 mm marked stenosis saw  neurosurgery  9/09  neurosurgery note  12/09 neurosurgery  note, severe left-sided foraminal stenosis, L5-S1 spondylolisthesis 4-5 mm recommend surgery, patient declined, has had epidural with no benefit,tried lyrica and neurontin before, declines cymbalta trial  4/10 note dr. johnson neurosurgery who gives patient norco, he is retiring, and has offered patient surgical therapy versus continuing norco 10 mg which we'll assume dispensing.  10/11 MRI lumbar spine grade 1 spondylolisthesis 9mm, bilateral spondylosis L5, moderate bilateral L5 stenosis, 2.4 cm left kidney cyst  10/11 xray LS, grade 1 spondylolisthesis L5 on S1 increased from prior exam, 1.3 cm, no significant change in flexion  11/11  pain note right L5-S1 transforaminal epidural  1/16/12 start cymbalta trial (3/12 stop cymbalta no benefit)  1/12 dr. murphy neurosurgery note surgical intervention offered L4 to sacrum decompression  3/26/12 restarted cymbalta    9/26/12  neurosurgery operative note decompression at L5-S1and bilateral foraminotomies,transforaminal lumbar interbody fusion, L4-L5 and L5-S1, with expandable cage 8-12 mm.  2/20/13 dr. murphy neurosurgery note, lumbar films, EMG NCS lower ext inconclusive, repeat MRI lumbar spine pending  3/13/13 MRI lumbar spine postoperative changes L4-S1 lumbar laminectomy, interbody fusion, disc space insert L4-L5 L5-S1, posterior spinal fusion and fixation, L5-S1 moderate right foraminal stenosis. 2.4 cm mid right renal cyst unchanged  3/26/13 dr. murphy neurosurgery note, no further surgical intervention necessary, chronic narcotics norco 10 mg 5 per day; I will assume the norco rx from   1/25/15 off naprosyn and cymbalta; on norco 10 mg one every four hours #150  5/21/15 on percocet 10 mg five per day and on cymbalta 60 mg  3/25/16 start lyrica 50 mg tid for fibromyalgia, continue percocet 10 mg 5 per day and cymbalta 60 mg  4/12/16 increase lyrica to 100 mg tid (50 mg two tid), continue cymbalta 60 mg and percocet 10 mg 5 per  day  9/26/16 off lyrica, on percocet 10 mg qid per  pain management and cymbalta  7/9/19 on percocet 10 mg #140 per 28 days from  pain management also on cymbalta 60 mg and naprosyn 500 mg bid     Speech language deficit  1999 affected right arm, no old records  Some diff with short recall, and occasional diff with expressing self when fatigued     Status post knee replacement left 2001     Status post hip replacement bilateral  12/03 right total hip replacement  1/04 left total hip replacement      Preventative health  4/6/07 colonoscopy [facility] negative  9/17/09 pneumovax  10/26/10 zoster vaccine  8/5/11 td  8/27/15 prevnar at [facility]  9/29/17 declines colonoscopy,FIT card provided  rinted  11/8/18 vit d 46  11/8/18 psa 1.4  1/4/19 FIT negative     Paroxysmal atrial fibrillation  Sees [facility]  2005 s/p maze procedure with a stroke related to that as a complication, have no records at all regarding that, no CT scan or MRI scan and probably had a cardiac catheterization by renal physicians in 2005 that was negative for  Coronary artery disease.  8/11 [facility] note EKG NSR  5/12 [facility] note, on asa daily  11/12 [facility] note  4/29/13 cardiology note  11/3/13 cardiology note on asa  9/22/14 cardiology note sinus, follow up 6 months  5/12/15  cardiology note, paroxysmal atrial fibrillation status post maze operation, sinus rhythm  11/18/15 cardiology note stable follow one year  4/4/17  cardiology note, paroxysmal atrial fibrillation no recurrence, follow-up one year  2/5/19 cardiology note paroxysmal atrial fibrillation, sinus on exam, no recurrence of atrial fibrillation status post maze, follow-up 1 year    opioid dependance  3/26/13 dr. murphy neurosurgery note, no further surgical intervention necessary, chronic narcotics norco 10 mg 5 per day; I will assume the norco rx from   12/6/13 change from hydrocodone 10 mg 4-5 tablets per day to oxycodone 10 mg 4-5 tabs per day  1/22/15 UDT  millenium consistent  2/24/15 change from hydrocodone 10 mg to oxycodone 10 mg (percocet) qid #150 per 30 days  7/9/19 on percocet 10 mg #140 per 28 days from  pain management also on cymbalta 60 mg and naprosyn 500 mg bid     Insomnia  2009 on ambien 10 mg  2/19/16 unable to taper ambien, referral to behavioral sleep psychology recommended he declines  9/26/16 on ambien 10 mg work on taper, declines referral to sleep psychology  7/9/18 on ambien declines sleep management referral     Impaired glucose metabolism  11/8/18 A1c 6%     Hypertension  7/10 [facility] note change from hyzaar to enalapril hct 10/25  1/11 [facility] note bp not controlled on enalapril hct 10/25, change back to losartan hct 100/25 and norvasc 10 mg  6/12 K+ 3.0, increase kdur to 20 tid, consider decreasing hctz if possible to decrease K+ supplementation  12/12 cozaar 100 mg, dyazide 37.5/25 mg, kcl 20 meq, norvasc 10 mg  1/29/14 on cozaar 100 mg, dyazide 37.5/25 mg, norvasc 10 mg, klor 20 meq bid; will increase to klor 20 meq tid  9/3//14 K+ 3.1 on klor 20 meq tid, increase to 20 meq two tab bid  9/3/14 urine mac <0.5 on cozaar 100 mg, dyazide 37.5/25 mg, norvasc 10 mg, klor 20 meq two tab bid  6/30/15 Na 132,K+ 3.5 on cozaar 100 mg, dyazide 37.5/25 mg, norvasc 10 mg, klor 20 meq two tab bid  8/31/16 K+ 3.3 on cozaar 100 mg, dyazide 37.5/25 mg, norvasc 10 mg, klor 20 meq two tab qday will increase to 2 bid  7/11/17 K+3.3 on cozaar 100 mg, dyazide 37.5/25 mg, norvasc 10 mg, klor 20 meq two bid  2/5/19 cardiology note paroxysmal atrial fibrillation, sinus on exam, no recurrence of atrial fibrillation status post maze, follow-up 1 year  12/6/19 K+3.5 on cozaar 100 mg, dyazide 37.5/25 mg, norvasc 10 mg, klor 20 meq two bid     History stroke  1999 affected right arm, no old records  Some difficulty with short recall, and occasional diff with expressing self when fatigued     History squamous cell carcinoma  Saw  dermatology offered sree  he declined  3/27/17  dermatology note  5/1/17  dermatology biopsy proven squamous cell carcinoma in situ left forehead, here for mohs  9/19/17  dermatology note continue      Fibromyalgia  3/25/16 start lyrica 50 mg tid for fibromyalgia, continue percocet 10 mg 5 per day and cymbalta 60 mg  4/12/16 increase lyrica to 100 mg tid (50 mg two tid), continue cymbalta 60 mg and percocet 10 mg 5 per day  4/28/16 increase lyrica to 150 mg tid, continue cymbalta 60 mg and percocet 10 mg five times per day  5/10/16 on lyrica 50 mg three tabs tid, change to 100 mg am, 150 mg noon, 100 mg pm     Dyslipidemia  Followed by cardiology  2/09 cholesterol 135, triglycerides 100, HDL 47, LDL 68  8/09 chol 138,trig 101,hdl 49,ldl 69  3/10 chol 123,trig 66,hdl 49,ldl 61  7/10 [facility] note change vytorin to zocor 40  11/10 chol 141,trig 75,hdl 62,ldl 64 on vytorin 10/20 mg per [facility]  10/11 chol 159,trig 102,hdl 52,ldl 87 on vytorin 10/20 per [facility]  2/29/12 stop vytorin, change to zocor 20 mg per [facility]  6/12 chol 152,trig 102,hdl 49,ldl 83 on zocor 20 mg  6/13 chol 150,trig 130,hdl 52,ldl 72 on zocor 20 mg  1/29/14 chol 147,trig 127,hdl 50,ldl 72 on zocor 20 mg  9/3/14 chol 169,trig 228,hdl 49,ldl 74 on zocor 20 mg  6/30/15 chol 151,trig 88,hdl 63,ldl 70 on zocor 20 mg  8/31/16 chol 169,trig 130,hdl 59,ldl 84 on zocor 20 mg  7/11/17 chol 147,trig 146,hdl 51,ldl 67 on zocor 20 mg  11/8/18 chol 189,trig 215,hdl 54,ldl 92 on zocor 20 mg  12/6/19 chol 63,trig 214,hdl 54,ldl 66 on zocor 20 mg     Depression  3/12 tried cymalta for pain no benefit  12/6/13 PHQ 9 score 15, start cymbalta samples 30 mg x 7 days, then 60 mg  12/20/13 cymbalta 60 mg  1/22/15 off cymbalta    5/21/15 on cymbalta 60 mg  2/19/16 referral to behavioral health recommended he declines  11/29/16 depression screening score 0, continues on cymbalta  1/30/18 depression screening score 0 on cymbalta     Chronic opiate  3/26/13 dr. murphy neurosurgery  note, no further surgical intervention necessary, chronic narcotics norco 10 mg 5 per day; I will assume the norco rx from   12/6/13 change from hydrocodone 10 mg 4-5 tablets per day to oxycodone 10 mg 4-5 tabs per day  1/22/15 UDT millenium consistent  2/24/15 change from hydrocodone 10 mg to oxycodone 10 mg (percocet) qid #150 per 30 days  1/22/14 norco 10 mg #180, millenium URT done  2/24/15 change to percocet 10 mg #150 only one prescription provided, if successful for pain relief call us and I will write next 3 refills that he can  and then followup in office after that  5/21/15 percocet 10 mg #150 refill  5/21/15 narcotic contract  5/21/15 opiate risk score 1  8/24/15 refill percocet #150 x 3  8/24/15 referral pain management  10/4/15   2/19/16   2/19/16 increase frequency to percocet 10 mg q 4 hours #180 per month, declined trial of MS contin, because of increasing utilization, referral made to pain management   5/24/16   5/24/16 UDT millennium  5/24/16 refill percocet #150 x 2 refer to  pain management to assume opiate prescription writing   6/22/16  pain management note, initiate pregabalin, work on taper oxycodone  7/6/16  continue oxycodone and pregabalin  8/9/16  pain management note continue oxycodone, consider ultrasound-guided knee injections to right  9/12/16  pain management note, UDT consistent  7/9/19 on percocet 10 mg #140 per 28 days from  pain management also on cymbalta 60 mg and naprosyn 500 mg bid  7/9/19 referral [facility]      Cervical pain  3/06 MRI cervical spine moderate subdural frontal stenosis C5-C6 lesser extent C6-C7     Arthritis knee  9/3/14 x-ray right knee marked tricompartmental osteoarthritis, most severe medial compartment  2/19/16 has seen  orthopedics, declines knee replacement                  Current medicines (including changes today)  Current Outpatient Medications    Medication Sig Dispense Refill   • simvastatin (ZOCOR) 20 MG Tab Take 1 Tab by mouth every evening. 100 Tab 3   • amLODIPine (NORVASC) 10 MG Tab Take 1 Tab by mouth every day. 100 Tab 0   • triamterene/hctz (MAXZIDE-25/DYAZIDE) 37.5-25 MG Cap Take 1 Cap by mouth every day. 100 Cap 0   • DULoxetine (CYMBALTA) 60 MG Cap DR Particles delayed-release capsule TAKE ONE CAPSULE BY MOUTH DAILY 90 Cap 1   • zolpidem (AMBIEN) 10 MG Tab Take 1 Tab by mouth at bedtime as needed for Sleep for up to 90 days. Indications: Trouble Sleeping 90 Tab 1   • losartan (COZAAR) 100 MG Tab TAKE ONE TABLET BY MOUTH DAILY 100 Tab 0   • naproxen (NAPROSYN) 500 MG Tab TAKE ONE TABLET BY MOUTH TWICE A DAY AS NEEDED FOR PAIN 180 Tab 3   • potassium chloride SA (KDUR) 20 MEQ Tab CR Take 2 Tabs by mouth 2 times a day. 360 Tab 3   • Multiple Vitamins-Minerals (MULTI COMPLETE PO) Take  by mouth.     • diphenhydrAMINE (BENADRYL) 25 MG Tab Take 25 mg by mouth every 6 hours as needed for Sleep.     • Oxycodone-Acetaminophen (PERCOCET-10)  MG Tab Take 1 Tab by mouth every four hours as needed for Moderate Pain. Ok to refill percocet 28-30 days after the percocet written on 5/24/16 is filled 180 Tab 0   • aspirin (ASA) 81 MG Chew Tab chewable tablet Take 1 Tab by mouth every day. 100 Tab 11   • polyethylene glycol/lytes (MIRALAX) PACK Take 17 g by mouth every day.     • docusate sodium (COLACE) 100 MG CAPS Take 100 mg by mouth every day.     • vitamin D (CHOLECALCIFEROL) 1000 UNIT TABS Take 6,000 Units by mouth every day.       No current facility-administered medications for this visit.        Patient Active Problem List    Diagnosis Date Noted   • Speech and language deficit, late effect of cerebrovascular disease 05/20/2015     Priority: Low   • Impaired glucose metabolism 11/08/2018   • Fibromyalgia 03/26/2016   • S/P Maze operation for atrial fibrillation 11/18/2015   • Opiate dependence, continuous (CMS-HCC) 05/20/2015   • Arthritis of knee,  "right 09/03/2014   • Depression, major, in partial remission (HCC) 12/06/2013   • Chronic pain 12/06/2013   • Insomnia 02/08/2013   • History of stroke 08/05/2011   • Plantar fasciitis 11/02/2010   • Obesity 09/17/2009   • S/P hip replacement 09/16/2009   • Status post lumbar surgery 09/16/2009   • Cervical pain 09/16/2009   • Hypertension 09/16/2009   • Paroxysmal atrial fibrillation (HCC) 09/16/2009   • Dyslipidemia 09/16/2009   • History of squamous cell carcinoma 09/16/2009   • Preventative health care 09/16/2009   • S/P knee replacement 09/16/2009       Family History   Problem Relation Age of Onset   • Heart Disease Brother         atrial fib   • Cancer Sister         breast cancer   • Heart Disease Unknown    • Hypertension Unknown    • Stroke Unknown           Health Maintenance Summary                HEPATITIS C SCREENING Overdue 1945     Annual Wellness Visit Overdue 11/30/2017      Done 11/29/2016 Visit Dx: Medicare annual wellness visit, subsequent     Patient has more history with this topic...    IMM ZOSTER VACCINES Overdue 4/2/2019      Done 2/5/2019 Imm Admin: Recombinant Zoster Vaccine (RZV) (Shingrix)     Patient has more history with this topic...    IMM INFLUENZA Next Due 9/1/2020      Done 12/12/2019 Imm Admin: Influenza Vaccine Adult HD     Patient has more history with this topic...    IMM DTaP/Tdap/Td Vaccine Next Due 12/12/2029      Done 12/12/2019 Imm Admin: Tdap Vaccine     Patient has more history with this topic...          Patient Care Team:  Norman Peterson M.D. as PCP - General  Jose Martin Campbell M.D. as Consulting Physician (Anesthesia)  Irineo Skinner M.D. as Consulting Physician (Cardiac Electrophysiology)  Pete Rangel O.D. as Consulting Physician (Optometry)  Reji Roberson as [facility]        Objective:   /80 (BP Location: Left arm, Patient Position: Sitting, BP Cuff Size: Adult)   Temp 36.6 °C (97.8 °F)   Ht 1.803 m (5' 11\")   Wt " 122.9 kg (271 lb)   BMI 37.80 kg/m²     Physical Exam:   Constitutional: Alert, no distress, well-groomed.  Skin: No rashes in visible areas.  Eye: Round. Conjunctiva clear, lids normal. No icterus.   ENMT: Lips pink without lesions, good dentition, moist mucous membranes. Phonation normal.  Neck: No masses, no thyromegaly. Moves freely without pain.  Respiratory: Unlabored respiratory effort, no cough or audible wheeze  Psych: Alert and oriented x3, normal affect and mood.       Assessment and Plan:   The following treatment plan was discussed:   Assessment  #!  Hypertension stable and controlled on losartan, Dyazide, amlodipine, potassium followed by cardiology    #2 paroxysmal atrial fibrillation followed by cardiology no recurrence    #3 chronic low back pain status post lumbar surgery, has seen physical therapy, currently followed by Dr. Mathias for pain management on Percocet 10 mg 5 tablets/day, also on Cymbalta and naproxen    #4 obesity BMI 37.8 unable to do much exercise because of back pain    #5 insomnia on Ambien with no daytime drowsiness or hangover effect normally    Plan  #1 continue check blood pressure regularly and record, notify us if systolic greater than 140 or diastolic greater than 85 consistently, no change medications    #2 follow-up cardiology    #3 continue follow-up pain management Dr. Mathias    #4 encourage activity as much as possible again following social distancing guidelines    #5 agree with staying at home and avoiding going out because of COVID    #6 work on tapering Ambien, long-term use can increase risk for habituation, dependence, memory loss, dementia, he can cut the medication in half, and at some point go to half a tablet a few days a week, slowly tapering off medication, work on sleep hygiene    #7 follow-up in December, check labs at that time    #8 continue social distancing    #9 keep on the look out for the influenza vaccine which should be done in September    #10  greater than 20 minutes spent discussing  COVID-19 safety precautions, he has been doing outstanding job as has his wife, epidemiology of the virus why it is so dangerous because of asymptomatic spread, also discussed sleep habits, long-term risk of Ambien, working on taper, pain control medications, he is fine with seeing Dr. Mathias, also checking blood pressure, limiting sweets, candies, processed foods in diet, monitoring mood for anxiety and depression with pandemic

## 2020-08-04 DIAGNOSIS — I48.0 PAF (PAROXYSMAL ATRIAL FIBRILLATION) (HCC): ICD-10-CM

## 2020-08-04 DIAGNOSIS — I10 ESSENTIAL HYPERTENSION: ICD-10-CM

## 2020-08-05 RX ORDER — LOSARTAN POTASSIUM 100 MG/1
TABLET ORAL
Qty: 100 TAB | Refills: 3 | Status: SHIPPED | OUTPATIENT
Start: 2020-08-05 | End: 2021-09-10

## 2020-09-10 ENCOUNTER — PATIENT OUTREACH (OUTPATIENT)
Dept: HEALTH INFORMATION MANAGEMENT | Facility: OTHER | Age: 75
End: 2020-09-10

## 2020-09-10 NOTE — PROGRESS NOTES
Called member to schedule overdue AWV with PCP. No answer, no VM set up. If the member calls in, please transfer to [phone]    Attempt # 1

## 2020-09-11 NOTE — PROGRESS NOTES
Called to schedule member for overdue AWV with PCP. I spoke with the members wife and she took down the information. She declined scheduling at this time and advised she would speak with the member and call back to schedule if they were interested. If the member calls in, please transfer to [phone]

## 2020-12-03 ENCOUNTER — TELEPHONE (OUTPATIENT)
Dept: MEDICAL GROUP | Facility: MEDICAL CENTER | Age: 75
End: 2020-12-03

## 2020-12-03 ENCOUNTER — TELEMEDICINE (OUTPATIENT)
Dept: MEDICAL GROUP | Facility: MEDICAL CENTER | Age: 75
End: 2020-12-03
Payer: MEDICARE

## 2020-12-03 VITALS
WEIGHT: 266 LBS | TEMPERATURE: 97.4 F | DIASTOLIC BLOOD PRESSURE: 84 MMHG | HEIGHT: 71 IN | SYSTOLIC BLOOD PRESSURE: 136 MMHG | BODY MASS INDEX: 37.24 KG/M2

## 2020-12-03 DIAGNOSIS — E55.9 VITAMIN D DEFICIENCY: ICD-10-CM

## 2020-12-03 DIAGNOSIS — Z12.5 PROSTATE CANCER SCREENING: ICD-10-CM

## 2020-12-03 DIAGNOSIS — F11.20 OPIATE DEPENDENCE, CONTINUOUS (HCC): Chronic | ICD-10-CM

## 2020-12-03 DIAGNOSIS — E66.01 CLASS 3 SEVERE OBESITY DUE TO EXCESS CALORIES WITHOUT SERIOUS COMORBIDITY IN ADULT, UNSPECIFIED BMI (HCC): ICD-10-CM

## 2020-12-03 DIAGNOSIS — Z11.59 NEED FOR HEPATITIS C SCREENING TEST: ICD-10-CM

## 2020-12-03 DIAGNOSIS — R73.09 IMPAIRED GLUCOSE METABOLISM: ICD-10-CM

## 2020-12-03 DIAGNOSIS — E78.5 DYSLIPIDEMIA: Chronic | ICD-10-CM

## 2020-12-03 PROCEDURE — G0439 PPPS, SUBSEQ VISIT: HCPCS | Mod: 95,CR | Performed by: INTERNAL MEDICINE

## 2020-12-03 ASSESSMENT — FIBROSIS 4 INDEX: FIB4 SCORE: 1.64

## 2020-12-03 ASSESSMENT — ENCOUNTER SYMPTOMS: GENERAL WELL-BEING: FAIR

## 2020-12-03 ASSESSMENT — ACTIVITIES OF DAILY LIVING (ADL): BATHING_REQUIRES_ASSISTANCE: 0

## 2020-12-03 ASSESSMENT — PATIENT HEALTH QUESTIONNAIRE - PHQ9: CLINICAL INTERPRETATION OF PHQ2 SCORE: 0

## 2020-12-03 NOTE — PROGRESS NOTES
Virtual Visit: Established Patient   This visit was conducted via zoom using secure and encrypted videoconferencing technology. The patient was in a private location in the state of [place]  The patient's identity was confirmed and verbal consent was obtained for this virtual visit.    Subjective:   CC: Follow-up hypertension  Chief Complaint   Patient presents with   • Follow-Up       Julio Gonzalez is a 75 y.o. male presenting for evaluation and management of: Hypertension, medication, allergies, medical history, surgical history, social history reviewed and updated  Visit done by zoom for video component, via telephone for audio component, wife is present as well with the patient  Blood pressures running 130/70s on amlodipine 10 mg, losartan 100 mg, triamterene.  Dyslipidemia on Zocor daily.  Patient is staying at home, practicing social distancing during Covid pandemic, his wife goes out and does shopping.  Patient does not exercise regularly but is gets up and moves around the house periodically.  Tries to watch his nutrition, has not gained weight during the Covid pandemic.  Chronic pain followed by pain management  has been able to decrease his Percocet from 5 tablets to 4 tablets a day, currently on percocet 10 mg #120 per pain management, no anxiety, no depression with COVID-19 pandemic.  No mood changes.  Good social support with wife.  States he can be forgetful at times.  Wife has not noticed any significant memory lapses.       Allergies   Allergen Reactions   • Dye Fdc Blue [Brilliant Blue Fcf]      Angiogram contrast sensitivity, became very irritable     Status post lumbar surgery  2/01 MRI lumbar spine DJD L5-S1 anterolisthesis 4-5 mm marked stenosis saw  neurosurgery  9/09  neurosurgery note  12/09 neurosurgery note, severe left-sided foraminal stenosis, L5-S1 spondylolisthesis 4-5 mm recommend surgery, patient declined, has had epidural with no benefit,tried lyrica  and neurontin before, declines cymbalta trial  4/10 note dr. johnson neurosurgery who gives patient norco, he is retiring, and has offered patient surgical therapy versus continuing norco 10 mg which we'll assume dispensing.  10/11 MRI lumbar spine grade 1 spondylolisthesis 9mm, bilateral spondylosis L5, moderate bilateral L5 stenosis, 2.4 cm left kidney cyst  10/11 xray LS, grade 1 spondylolisthesis L5 on S1 increased from prior exam, 1.3 cm, no significant change in flexion  11/11  pain note right L5-S1 transforaminal epidural  1/16/12 start cymbalta trial (3/12 stop cymbalta no benefit)  1/12 dr. murphy neurosurgery note surgical intervention offered L4 to sacrum decompression  3/26/12 restarted cymbalta    9/26/12  neurosurgery operative note decompression at L5-S1and bilateral foraminotomies,transforaminal lumbar interbody fusion, L4-L5 and L5-S1, with expandable cage 8-12 mm.  2/20/13 dr. murphy neurosurgery note, lumbar films, EMG NCS lower ext inconclusive, repeat MRI lumbar spine pending  3/13/13 MRI lumbar spine postoperative changes L4-S1 lumbar laminectomy, interbody fusion, disc space insert L4-L5 L5-S1, posterior spinal fusion and fixation, L5-S1 moderate right foraminal stenosis. 2.4 cm mid right renal cyst unchanged  3/26/13 dr. murphy neurosurgery note, no further surgical intervention necessary, chronic narcotics norco 10 mg 5 per day; I will assume the norco rx from   1/25/15 off naprosyn and cymbalta; on norco 10 mg one every four hours #150  5/21/15 on percocet 10 mg five per day and on cymbalta 60 mg  3/25/16 start lyrica 50 mg tid for fibromyalgia, continue percocet 10 mg 5 per day and cymbalta 60 mg  4/12/16 increase lyrica to 100 mg tid (50 mg two tid), continue cymbalta 60 mg and percocet 10 mg 5 per day  9/26/16 off lyrica, on percocet 10 mg qid per  pain management and cymbalta  7/9/19 on percocet 10 mg #140 per 28 days from  pain management  also on cymbalta 60 mg and naprosyn 500 mg bid     Speech language deficit  1999 affected right arm, no old records  Some diff with short recall, and occasional diff with expressing self when fatigued     Status post knee replacement left 2001     Status post hip replacement bilateral  12/03 right total hip replacement  1/04 left total hip replacement      Preventative health  4/6/07 colonoscopy [facility] negative  10/26/10 zoster vaccine  8/27/15 prevnar at [facility]  11/8/18 vit d 46  11/8/18 psa 1.4  12/12/19 tdap  12/12/19 pneumovax  7/14/20 FIT negative     Paroxysmal atrial fibrillation  Sees [facility]  2005 s/p maze procedure with a stroke related to that as a complication, have no records at all regarding that, no CT scan or MRI scan and probably had a cardiac catheterization by renal physicians in 2005 that was negative for  Coronary artery disease.  8/11 [facility] note EKG NSR  5/12 [facility] note, on asa daily  11/12 [facility] note  4/29/13 cardiology note  11/3/13 cardiology note on asa  9/22/14 cardiology note sinus, follow up 6 months  5/12/15  cardiology note, paroxysmal atrial fibrillation status post maze operation, sinus rhythm  11/18/15 cardiology note stable follow one year  4/4/17  cardiology note, paroxysmal atrial fibrillation no recurrence, follow-up one year  2/5/19 cardiology note paroxysmal atrial fibrillation, sinus on exam, no recurrence of atrial fibrillation status post maze, follow-up 1 year  6/16/20 cardiology note atrial fibrillation status post maze no recurrence, hypertension stable on current regimen, work on weight loss, follow-up 6 months     opitate rx  3/26/13 dr. murphy neurosurgery note, no further surgical intervention necessary, chronic narcotics norco 10 mg 5 per day; I will assume the norco rx from   12/6/13 change from hydrocodone 10 mg 4-5 tablets per day to oxycodone 10 mg 4-5 tabs per day  1/22/15 UDT millenium consistent  2/24/15 change from hydrocodone 10 mg to  oxycodone 10 mg (percocet) qid #150 per 30 days  7/9/19 on percocet 10 mg #140 per 28 days from  pain management also on cymbalta 60 mg and naprosyn 500 mg bid     Insomnia  2009 on ambien 10 mg  2/19/16 unable to taper ambien, referral to behavioral sleep psychology recommended he declines  9/26/16 on ambien 10 mg work on taper, declines referral to sleep psychology  7/9/18 on ambien declines sleep management referral     Impaired glucose metabolism  11/8/18 A1c 6%  12/6/19 A1c 5.8%      Hypertension  7/10 [facility] note change from hyzaar to enalapril hct 10/25  1/11 [facility] note bp not controlled on enalapril hct 10/25, change back to losartan hct 100/25 and norvasc 10 mg  6/12 K+ 3.0, increase kdur to 20 tid, consider decreasing hctz if possible to decrease K+ supplementation  12/12 cozaar 100 mg, dyazide 37.5/25 mg, kcl 20 meq, norvasc 10 mg  1/29/14 on cozaar 100 mg, dyazide 37.5/25 mg, norvasc 10 mg, klor 20 meq bid; will increase to klor 20 meq tid  9/3//14 K+ 3.1 on klor 20 meq tid, increase to 20 meq two tab bid  9/3/14 urine mac <0.5 on cozaar 100 mg, dyazide 37.5/25 mg, norvasc 10 mg, klor 20 meq two tab bid  6/30/15 Na 132,K+ 3.5 on cozaar 100 mg, dyazide 37.5/25 mg, norvasc 10 mg, klor 20 meq two tab bid  8/31/16 K+ 3.3 on cozaar 100 mg, dyazide 37.5/25 mg, norvasc 10 mg, klor 20 meq two tab qday will increase to 2 bid  7/11/17 K+3.3 on cozaar 100 mg, dyazide 37.5/25 mg, norvasc 10 mg, klor 20 meq two bid  2/5/19 cardiology note paroxysmal atrial fibrillation, sinus on exam, no recurrence of atrial fibrillation status post maze, follow-up 1 year  12/6/19 K+3.5 on cozaar 100 mg, dyazide 37.5/25 mg, norvasc 10 mg, klor 20 meq two bid  6/16/20 cardiology note atrial fibrillation status post maze no recurrence, hypertension stable on current regimen, work on weight loss, follow-up 6 months     History stroke  1999 affected right arm, no old records  Some difficulty with short recall, and occasional diff  with expressing self when fatigued     History squamous cell carcinoma  Saw  dermatology offered aldara he declined  3/27/17  dermatology note  5/1/17  dermatology biopsy proven squamous cell carcinoma in situ left forehead, here for mohs  9/19/17  dermatology note continue      Fibromyalgia  3/25/16 start lyrica 50 mg tid for fibromyalgia, continue percocet 10 mg 5 per day and cymbalta 60 mg  4/12/16 increase lyrica to 100 mg tid (50 mg two tid), continue cymbalta 60 mg and percocet 10 mg 5 per day  4/28/16 increase lyrica to 150 mg tid, continue cymbalta 60 mg and percocet 10 mg five times per day  5/10/16 on lyrica 50 mg three tabs tid, change to 100 mg am, 150 mg noon, 100 mg pm     Dyslipidemia  Followed by cardiology  2/09 cholesterol 135, triglycerides 100, HDL 47, LDL 68  8/09 chol 138,trig 101,hdl 49,ldl 69  3/10 chol 123,trig 66,hdl 49,ldl 61  7/10 [facility] note change vytorin to zocor 40  11/10 chol 141,trig 75,hdl 62,ldl 64 on vytorin 10/20 mg per [facility]  10/11 chol 159,trig 102,hdl 52,ldl 87 on vytorin 10/20 per [facility]  2/29/12 stop vytorin, change to zocor 20 mg per [facility]  6/12 chol 152,trig 102,hdl 49,ldl 83 on zocor 20 mg  6/13 chol 150,trig 130,hdl 52,ldl 72 on zocor 20 mg  1/29/14 chol 147,trig 127,hdl 50,ldl 72 on zocor 20 mg  9/3/14 chol 169,trig 228,hdl 49,ldl 74 on zocor 20 mg  6/30/15 chol 151,trig 88,hdl 63,ldl 70 on zocor 20 mg  8/31/16 chol 169,trig 130,hdl 59,ldl 84 on zocor 20 mg  7/11/17 chol 147,trig 146,hdl 51,ldl 67 on zocor 20 mg  11/8/18 chol 189,trig 215,hdl 54,ldl 92 on zocor 20 mg  12/6/19 chol 163,trig 214,hdl 54,ldl 66 on zocor 20 mg     Depression  3/12 tried cymalta for pain no benefit  12/6/13 PHQ 9 score 15, start cymbalta samples 30 mg x 7 days, then 60 mg  12/20/13 cymbalta 60 mg  1/22/15 off cymbalta    5/21/15 on cymbalta 60 mg  2/19/16 referral to behavioral health recommended he declines  11/29/16 depression screening score 0,  continues on cymbalta  1/30/18 depression screening score 0 on cymbalta     Chronic opiate  3/26/13 dr. murphy neurosurgery note, no further surgical intervention necessary, chronic narcotics norco 10 mg 5 per day; I will assume the norco rx from   12/6/13 change from hydrocodone 10 mg 4-5 tablets per day to oxycodone 10 mg 4-5 tabs per day  1/22/15 UDT millenium consistent  2/24/15 change from hydrocodone 10 mg to oxycodone 10 mg (percocet) qid #150 per 30 days  1/22/14 norco 10 mg #180, millenium URT done  2/24/15 change to percocet 10 mg #150 only one prescription provided, if successful for pain relief call us and I will write next 3 refills that he can  and then followup in office after that  5/21/15 percocet 10 mg #150 refill  5/21/15 narcotic contract  5/21/15 opiate risk score 1  8/24/15 refill percocet #150 x 3  8/24/15 referral pain management  10/4/15   2/19/16   2/19/16 increase frequency to percocet 10 mg q 4 hours #180 per month, declined trial of MS contin, because of increasing utilization, referral made to pain management   5/24/16   5/24/16 UDT millennium  5/24/16 refill percocet #150 x 2 refer to  pain management to assume opiate prescription writing   6/22/16  pain management note, initiate pregabalin, work on taper oxycodone  7/6/16  continue oxycodone and pregabalin  8/9/16  pain management note continue oxycodone, consider ultrasound-guided knee injections to right  9/12/16  pain management note, UDT consistent  7/9/19 on percocet 10 mg #140 per 28 days from  pain management also on cymbalta 60 mg and naprosyn 500 mg bid  7/9/19 referral [facility]   7/20/20 on percocet 10 mg 5 per day per  pain management, cymbalta 60 mg, naprosyn 500 mg bid      Cervical pain  3/06 MRI cervical spine moderate subdural frontal stenosis C5-C6 lesser extent C6-C7     Arthritis knee  9/3/14 x-ray right knee marked  tricompartmental osteoarthritis, most severe medial compartment  2/19/16 has seen  orthopedics, declines knee replacement      Current medicines (including changes today)  Current Outpatient Medications   Medication Sig Dispense Refill   • zolpidem (AMBIEN) 10 MG Tab TAKE ONE TABLET BY MOUTH AT BEDTIME AS NEEDED FOR SLEEP  90 Tab 1   • triamterene/hctz (MAXZIDE-25/DYAZIDE) 37.5-25 MG Cap Take 1 Cap by mouth every day. 90 Cap 0   • amLODIPine (NORVASC) 10 MG Tab TAKE ONE TABLET BY MOUTH DAILY 90 Tab 0   • losartan (COZAAR) 100 MG Tab TAKE ONE TABLET BY MOUTH DAILY 100 Tab 3   • simvastatin (ZOCOR) 20 MG Tab Take 1 Tab by mouth every evening. 100 Tab 3   • DULoxetine (CYMBALTA) 60 MG Cap DR Particles delayed-release capsule TAKE ONE CAPSULE BY MOUTH DAILY 90 Cap 1   • naproxen (NAPROSYN) 500 MG Tab TAKE ONE TABLET BY MOUTH TWICE A DAY AS NEEDED FOR PAIN 180 Tab 3   • potassium chloride SA (KDUR) 20 MEQ Tab CR Take 2 Tabs by mouth 2 times a day. 360 Tab 3   • Multiple Vitamins-Minerals (MULTI COMPLETE PO) Take  by mouth.     • diphenhydrAMINE (BENADRYL) 25 MG Tab Take 25 mg by mouth every 6 hours as needed for Sleep.     • Oxycodone-Acetaminophen (PERCOCET-10)  MG Tab Take 1 Tab by mouth every four hours as needed for Moderate Pain. Ok to refill percocet 28-30 days after the percocet written on 5/24/16 is filled 180 Tab 0   • aspirin (ASA) 81 MG Chew Tab chewable tablet Take 1 Tab by mouth every day. 100 Tab 11   • polyethylene glycol/lytes (MIRALAX) PACK Take 17 g by mouth every day.     • docusate sodium (COLACE) 100 MG CAPS Take 100 mg by mouth every day.     • vitamin D (CHOLECALCIFEROL) 1000 UNIT TABS Take 6,000 Units by mouth every day.       No current facility-administered medications for this visit.        Patient Active Problem List    Diagnosis Date Noted   • Speech and language deficit, late effect of cerebrovascular disease 05/20/2015     Priority: Low   • Impaired glucose metabolism  11/08/2018   • Fibromyalgia 03/26/2016   • S/P Maze operation for atrial fibrillation 11/18/2015   • Opiate dependence, continuous (CMS-HCC) 05/20/2015   • Arthritis of knee, right 09/03/2014   • Depression, major, in partial remission (HCC) 12/06/2013   • Chronic pain 12/06/2013   • Insomnia 02/08/2013   • History of stroke 08/05/2011   • Plantar fasciitis 11/02/2010   • Obesity 09/17/2009   • S/P hip replacement 09/16/2009   • Status post lumbar surgery 09/16/2009   • Cervical pain 09/16/2009   • Hypertension 09/16/2009   • Paroxysmal atrial fibrillation (HCC) 09/16/2009   • Dyslipidemia 09/16/2009   • History of squamous cell carcinoma 09/16/2009   • Preventative health care 09/16/2009   • S/P knee replacement 09/16/2009       Family History   Problem Relation Age of Onset   • Heart Disease Brother         atrial fib   • Cancer Sister         breast cancer   • Heart Disease Unknown    • Hypertension Unknown    • Stroke Unknown         Depression Screening    Little interest or pleasure in doing things?  0 - not at all  Feeling down, depressed , or hopeless? 0 - not at all  Patient Health Questionnaire Score: 0     If depressive symptoms identified deferred to follow up visit unless specifically addressed in assessment and plan.    Interpretation of PHQ-9 Total Score   Score Severity   1-4 No Depression   5-9 Mild Depression   10-14 Moderate Depression   15-19 Moderately Severe Depression   20-27 Severe Depression    Screening for Cognitive Impairment    Three Minute Recall (river, derw, finger) 2/3    Ken clock face with all 12 numbers and set the hands to show 10 past 11.       Cognitive concerns identified deferred for follow up unless specifically addressed in assessment and plan.    Fall Risk Assessment    Has the patient had two or more falls in the last year or any fall with injury in the last year?  No    Safety Assessment    Throw rugs on floor.  Yes  Handrails on all stairs.  Yes  Good lighting in all  hallways.  Yes  Difficulty hearing.  No  Patient counseled about all safety risks that were identified.    Functional Assessment ADLs    Are there any barriers preventing you from cooking for yourself or meeting nutritional needs?  No.    Are there any barriers preventing you from driving safely or obtaining transportation?  Yes.    Are there any barriers preventing you from using a telephone or calling for help?  No.    Are there any barriers preventing you from shopping?  Yes.    Are there any barriers preventing you from taking care of your own finances?  Yes.    Are there any barriers preventing you from managing your medications?  Yes.    Are there any barriers preventing you from showering, bathing or dressing yourself?  No.    Are you currently engaging in any exercise or physical activity?  Yes.     What is your perception of your health?  Fair.      Health Maintenance Summary                HEPATITIS C SCREENING Overdue 1945     Annual Wellness Visit Overdue 11/30/2017      Done 11/29/2016 Visit Dx: Medicare annual wellness visit, subsequent     Patient has more history with this topic...    IMM ZOSTER VACCINES Overdue 4/2/2019      Done 2/5/2019 Imm Admin: Zoster Vaccine Recombinant (RZV) (SHINGRIX)     Patient has more history with this topic...    IMM INFLUENZA Overdue 9/1/2020      Done 12/12/2019 Imm Admin: Influenza Vaccine Adult HD     Patient has more history with this topic...    IMM DTaP/Tdap/Td Vaccine Next Due 12/12/2029      Done 12/12/2019 Imm Admin: Tdap Vaccine     Patient has more history with this topic...          Patient Care Team:  Norman Peterson M.D. as PCP - General  Jose Martin Campbell M.D. as Consulting Physician (Anesthesia)  Irineo Skinner M.D. as Consulting Physician (Cardiac Electrophysiology)  Pete Rangel O.D. as Consulting Physician (Optometry)  Reji Roberson as [facility]        Objective:   /84 (BP Location: Right arm, Patient  "Position: Sitting, BP Cuff Size: Adult)   Temp 36.3 °C (97.4 °F)   Ht 1.803 m (5' 11\")   Wt 120.7 kg (266 lb)   BMI 37.10 kg/m²     Physical Exam:   Constitutional: Alert, no distress, well-groomed.  Skin: No rashes in visible areas.  Eye: Round. Conjunctiva clear, lids normal. No icterus.   ENMT: Lips pink without lesions, good dentition, moist mucous membranes. Phonation normal.  Neck: No masses, no thyromegaly. Moves freely without pain.  Respiratory: Unlabored respiratory effort, no cough or audible wheeze  Psych: Alert and oriented x3, normal affect and mood.       Assessment and Plan:   The following treatment plan was discussed:   Assessment  #! hypertension stable and controlled on current medication regimen    #2 dyslipidemia on simvastatin    #3 depression in partial remission on Cymbalta, stable mood despite Covid pandemic    #4 chronic pain followed by pain management  on Percocet quantity 4 tablets/day which is decreased from 5 tablets/day    #5 low vitamin D    #6 impaired glucose metabolism      Plan  #1 continue current medications    #2 follow-up pain management    #3 old records pain management    #4 recommend he get the influenza vaccine at the pharmacy    #5 continue social distancing measures and mask wearing when in public during Covid pandemic    #6 continue work on nutrition and increase activity    #7 follow-up 6 months    #8 labs ordered, he will get that done at the laboratory at his convenience        "

## 2020-12-03 NOTE — LETTER
[employer]  Norman Peterson M.D.  [address]  [place] [address]  Fax: [phone]   Authorization for Release/Disclosure of   Protected Health Information   Name: SHANITA PROCTOR : 1945 SSN: [id number]   Address: [address] [place] [address]  [place] [address] Phone:    [phone] (home)    I authorize the entity listed below to release/disclose the PHI below to:   [facility] [facility]/Norman Peterson M.D. and Norman Peterson M.D.   Provider or Entity Name:                                                            Dr Mathias (Physiatry)   Address   [facility], [facility], [facility]   Phone:                 [phone]    Fax:                             [phone]   Reason for request: continuity of care   Information to be released: PAST 12 mo of records   [  ] LAST COLONOSCOPY,  including any PATH REPORT and follow-up  [  ] LAST FIT/COLOGUARD RESULT [  ] LAST DEXA  [  ] LAST MAMMOGRAM  [  ] LAST PAP  [  ] LAST LABS [  ] RETINA EXAM REPORT  [  ] IMMUNIZATION RECORDS  [xx  ] Release all info (Past 12 mo)      [  ] Check here and initial the line next to each item to release ALL health information INCLUDING  _____ Care and treatment for drug and / or alcohol abuse  _____ HIV testing, infection status, or AIDS  _____ Genetic Testing    DATES OF SERVICE OR TIME PERIOD TO BE DISCLOSED: _____________  I understand and acknowledge that:  * This Authorization may be revoked at any time by you in writing, except if your health information has already been used or disclosed.  * Your health information that will be used or disclosed as a result of you signing this authorization could be re-disclosed by the recipient. If this occurs, your re-disclosed health information may no longer be protected by State or Federal laws.  * You may refuse to sign this Authorization. Your refusal will not affect your ability to obtain treatment.  * This Authorization becomes effective upon signing and will  on (date) __________.      If no date  is indicated, this Authorization will  one (1) year from the signature date.    Name: Julio Gonzalez    Signature:                     Continuity of Care    Date:     12/3/2020       PLEASE FAX REQUESTED RECORDS BACK TO: [phone]

## 2020-12-07 DIAGNOSIS — I48.0 PAF (PAROXYSMAL ATRIAL FIBRILLATION) (HCC): ICD-10-CM

## 2020-12-07 DIAGNOSIS — I10 ESSENTIAL HYPERTENSION: ICD-10-CM

## 2020-12-08 RX ORDER — TRIAMTERENE AND HYDROCHLOROTHIAZIDE 37.5; 25 MG/1; MG/1
CAPSULE ORAL
Qty: 100 CAP | Refills: 2 | Status: SHIPPED | OUTPATIENT
Start: 2020-12-08 | End: 2021-09-10

## 2020-12-08 RX ORDER — AMLODIPINE BESYLATE 10 MG/1
TABLET ORAL
Qty: 100 TAB | Refills: 2 | Status: SHIPPED | OUTPATIENT
Start: 2020-12-08 | End: 2021-09-10

## 2021-01-15 DIAGNOSIS — Z23 NEED FOR VACCINATION: ICD-10-CM

## 2021-02-03 ENCOUNTER — IMMUNIZATION (OUTPATIENT)
Dept: FAMILY PLANNING/WOMEN'S HEALTH CLINIC | Facility: IMMUNIZATION CENTER | Age: 76
End: 2021-02-03
Attending: INTERNAL MEDICINE
Payer: MEDICARE

## 2021-02-03 DIAGNOSIS — Z23 NEED FOR VACCINATION: ICD-10-CM

## 2021-02-03 DIAGNOSIS — Z23 ENCOUNTER FOR VACCINATION: Primary | ICD-10-CM

## 2021-02-03 PROCEDURE — 91300 PFIZER SARS-COV-2 VACCINE: CPT | Performed by: NURSE PRACTITIONER

## 2021-02-03 PROCEDURE — 0001A PFIZER SARS-COV-2 VACCINE: CPT | Performed by: NURSE PRACTITIONER

## 2021-02-27 ENCOUNTER — IMMUNIZATION (OUTPATIENT)
Dept: FAMILY PLANNING/WOMEN'S HEALTH CLINIC | Facility: IMMUNIZATION CENTER | Age: 76
End: 2021-02-27
Payer: MEDICARE

## 2021-02-27 DIAGNOSIS — Z23 ENCOUNTER FOR VACCINATION: Primary | ICD-10-CM

## 2021-02-27 PROCEDURE — 0002A PFIZER SARS-COV-2 VACCINE: CPT | Performed by: NURSE PRACTITIONER

## 2021-02-27 PROCEDURE — 91300 PFIZER SARS-COV-2 VACCINE: CPT | Performed by: NURSE PRACTITIONER

## 2021-05-17 ENCOUNTER — PATIENT MESSAGE (OUTPATIENT)
Dept: HEALTH INFORMATION MANAGEMENT | Facility: OTHER | Age: 76
End: 2021-05-17

## 2021-05-22 DIAGNOSIS — E78.5 DYSLIPIDEMIA: ICD-10-CM

## 2021-05-27 ENCOUNTER — PATIENT OUTREACH (OUTPATIENT)
Dept: HEALTH INFORMATION MANAGEMENT | Facility: OTHER | Age: 76
End: 2021-05-27

## 2021-05-27 RX ORDER — SIMVASTATIN 20 MG
TABLET ORAL
Qty: 100 TABLET | Refills: 3 | Status: SHIPPED | OUTPATIENT
Start: 2021-05-27 | End: 2022-06-14

## 2021-05-27 NOTE — PROGRESS NOTES
Called pt to schedule R[facility]. Verified HIPAA. Spoke with Pt's wife and she declined to schedule the appointment.       Attempt # 2

## 2021-07-13 ENCOUNTER — TELEMEDICINE (OUTPATIENT)
Dept: CARDIOLOGY | Facility: MEDICAL CENTER | Age: 76
End: 2021-07-13
Payer: MEDICARE

## 2021-07-13 VITALS
HEIGHT: 71 IN | WEIGHT: 264 LBS | SYSTOLIC BLOOD PRESSURE: 143 MMHG | DIASTOLIC BLOOD PRESSURE: 81 MMHG | BODY MASS INDEX: 36.96 KG/M2

## 2021-07-13 DIAGNOSIS — E78.5 DYSLIPIDEMIA: Primary | Chronic | ICD-10-CM

## 2021-07-13 DIAGNOSIS — Z86.73 HISTORY OF STROKE: Chronic | ICD-10-CM

## 2021-07-13 DIAGNOSIS — I48.0 PAROXYSMAL ATRIAL FIBRILLATION (HCC): Chronic | ICD-10-CM

## 2021-07-13 DIAGNOSIS — I10 ESSENTIAL HYPERTENSION: Chronic | ICD-10-CM

## 2021-07-13 DIAGNOSIS — Z98.890 S/P MAZE OPERATION FOR ATRIAL FIBRILLATION: ICD-10-CM

## 2021-07-13 DIAGNOSIS — I69.928 SPEECH AND LANGUAGE DEFICIT, LATE EFFECT OF CEREBROVASCULAR DISEASE: ICD-10-CM

## 2021-07-13 DIAGNOSIS — Z86.79 S/P MAZE OPERATION FOR ATRIAL FIBRILLATION: ICD-10-CM

## 2021-07-13 PROCEDURE — 99214 OFFICE O/P EST MOD 30 MIN: CPT | Mod: 95 | Performed by: INTERNAL MEDICINE

## 2021-07-13 RX ORDER — ZOLPIDEM TARTRATE 10 MG/1
TABLET ORAL
COMMUNITY
End: 2021-08-19

## 2021-07-13 ASSESSMENT — FIBROSIS 4 INDEX: FIB4 SCORE: 1.66

## 2021-08-02 ENCOUNTER — TELEPHONE (OUTPATIENT)
Dept: MEDICAL GROUP | Facility: MEDICAL CENTER | Age: 76
End: 2021-08-02

## 2021-08-02 ENCOUNTER — TELEMEDICINE (OUTPATIENT)
Dept: MEDICAL GROUP | Facility: MEDICAL CENTER | Age: 76
End: 2021-08-02
Payer: MEDICARE

## 2021-08-02 VITALS
SYSTOLIC BLOOD PRESSURE: 134 MMHG | HEART RATE: 82 BPM | TEMPERATURE: 97.7 F | BODY MASS INDEX: 37.8 KG/M2 | WEIGHT: 270 LBS | DIASTOLIC BLOOD PRESSURE: 78 MMHG | HEIGHT: 71 IN

## 2021-08-02 DIAGNOSIS — I48.0 PAROXYSMAL ATRIAL FIBRILLATION (HCC): Chronic | ICD-10-CM

## 2021-08-02 DIAGNOSIS — F11.20 OPIATE DEPENDENCE, CONTINUOUS (HCC): ICD-10-CM

## 2021-08-02 DIAGNOSIS — F33.41 RECURRENT MAJOR DEPRESSIVE DISORDER, IN PARTIAL REMISSION (HCC): Chronic | ICD-10-CM

## 2021-08-02 DIAGNOSIS — I10 ESSENTIAL HYPERTENSION: Chronic | ICD-10-CM

## 2021-08-02 DIAGNOSIS — Z00.00 PREVENTATIVE HEALTH CARE: Chronic | ICD-10-CM

## 2021-08-02 DIAGNOSIS — E66.01 CLASS 3 SEVERE OBESITY DUE TO EXCESS CALORIES WITHOUT SERIOUS COMORBIDITY IN ADULT, UNSPECIFIED BMI (HCC): ICD-10-CM

## 2021-08-02 PROCEDURE — 99214 OFFICE O/P EST MOD 30 MIN: CPT | Mod: 95 | Performed by: INTERNAL MEDICINE

## 2021-08-02 RX ORDER — IBUPROFEN 400 MG/1
TABLET ORAL
COMMUNITY
Start: 2019-01-01 | End: 2022-01-04

## 2021-08-02 RX ORDER — LORATADINE 10 MG/1
TABLET ORAL
COMMUNITY
Start: 2019-01-01

## 2021-08-02 ASSESSMENT — FIBROSIS 4 INDEX: FIB4 SCORE: 1.66

## 2021-08-02 ASSESSMENT — ACTIVITIES OF DAILY LIVING (ADL): BATHING_REQUIRES_ASSISTANCE: 0

## 2021-08-02 ASSESSMENT — ENCOUNTER SYMPTOMS: GENERAL WELL-BEING: EXCELLENT

## 2021-08-02 ASSESSMENT — PATIENT HEALTH QUESTIONNAIRE - PHQ9: CLINICAL INTERPRETATION OF PHQ2 SCORE: 0

## 2021-08-02 NOTE — PROGRESS NOTES
Subjective:      Virtual Visit: Established Patient   This visit was conducted via Zoom using secure and encrypted videoconferencing technology. The patient was in a private location in the state of [place].    The patient's identity was confirmed and verbal consent was obtained for this virtual visit.    Subjective:   CC: follow up hypertension    Julio Gonzalez is a 76 y.o. male presenting for evaluation and management of: blood pressure  Telemedicine appointment could video and audio for this appointment, patient's wife is also present. Blood pressure runs 130/70s and sees cardiology 7/13/21  cardiology note on cozaar 100 mg, dyazide 37.5/25 mg, norvasc 10 mg, klor 20 meq two bid, tries to avoid salting food, tries to minimize sweets and processed foods but does eat ice cream or M&Ms at times, has gained about 6 pounds from his last visit.  Is practicing social distancing and mask wearing with the Covid pandemic especially with the delta variant so he stays at home mostly, his wife will do shopping, and they both have been fully vaccinated for the Covid vaccine.  Paroxysmal atrial fibrillation sees cardiology   Sees  pain management on norco four per day having decreased from 5/day, we do not have any recent records from pain management  Mood is stable, no anxiety or depression  Tries to walk in his house daily and keep active           Allergies   Allergen Reactions   • Dye Fdc Blue [Brilliant Blue Fcf]      Angiogram contrast sensitivity, became very irritable       Current medicines (including changes today)  Current Outpatient Medications   Medication Sig Dispense Refill   • zolpidem (AMBIEN) 10 MG Tab      • simvastatin (ZOCOR) 20 MG Tab TAKE ONE TABLET BY MOUTH EVERY EVENING 100 tablet 3   • KLOR-CON M20 20 MEQ Tab CR TAKE TWO TABLETS BY MOUTH TWICE A  Tab 3   • amLODIPine (NORVASC) 10 MG Tab TAKE ONE TABLET BY MOUTH DAILY 100 Tab 2   • triamterene/hctz (MAXZIDE-25/DYAZIDE)  37.5-25 MG Cap TAKE ONE CAPSULE BY MOUTH DAILY 100 Cap 2   • DULoxetine (CYMBALTA) 60 MG Cap DR Particles delayed-release capsule TAKE ONE CAPSULE BY MOUTH DAILY 90 Cap 3   • losartan (COZAAR) 100 MG Tab TAKE ONE TABLET BY MOUTH DAILY 100 Tab 3   • naproxen (NAPROSYN) 500 MG Tab TAKE ONE TABLET BY MOUTH TWICE A DAY AS NEEDED FOR PAIN 180 Tab 3   • Multiple Vitamins-Minerals (MULTI COMPLETE PO) Take  by mouth.     • diphenhydrAMINE (BENADRYL) 25 MG Tab Take 25 mg by mouth every 6 hours as needed for Sleep.     • Oxycodone-Acetaminophen (PERCOCET-10)  MG Tab Take 1 Tab by mouth every four hours as needed for Moderate Pain. Ok to refill percocet 28-30 days after the percocet written on 5/24/16 is filled 180 Tab 0   • aspirin (ASA) 81 MG Chew Tab chewable tablet Take 1 Tab by mouth every day. 100 Tab 11   • polyethylene glycol/lytes (MIRALAX) PACK Take 17 g by mouth every day.     • docusate sodium (COLACE) 100 MG CAPS Take 100 mg by mouth every day.     • vitamin D (CHOLECALCIFEROL) 1000 UNIT TABS Take 6,000 Units by mouth every day.       No current facility-administered medications for this visit.         Status post lumbar surgery  2/01 MRI lumbar spine DJD L5-S1 anterolisthesis 4-5 mm marked stenosis saw  neurosurgery  9/09  neurosurgery note  12/09 neurosurgery note, severe left-sided foraminal stenosis, L5-S1 spondylolisthesis 4-5 mm recommend surgery, patient declined, has had epidural with no benefit,tried lyrica and neurontin before, declines cymbalta trial  4/10 note dr. johnson neurosurgery who gives patient norco, he is retiring, and has offered patient surgical therapy versus continuing norco 10 mg which we'll assume dispensing.  10/11 MRI lumbar spine grade 1 spondylolisthesis 9mm, bilateral spondylosis L5, moderate bilateral L5 stenosis, 2.4 cm left kidney cyst  10/11 xray LS, grade 1 spondylolisthesis L5 on S1 increased from prior exam, 1.3 cm, no significant change in  flexion  11/11  pain note right L5-S1 transforaminal epidural  1/16/12 start cymbalta trial (3/12 stop cymbalta no benefit)  1/12 dr. murphy neurosurgery note surgical intervention offered L4 to sacrum decompression  3/26/12 restarted cymbalta    9/26/12  neurosurgery operative note decompression at L5-S1and bilateral foraminotomies,transforaminal lumbar interbody fusion, L4-L5 and L5-S1, with expandable cage 8-12 mm.  2/20/13 dr. murphy neurosurgery note, lumbar films, EMG NCS lower ext inconclusive, repeat MRI lumbar spine pending  3/13/13 MRI lumbar spine postoperative changes L4-S1 lumbar laminectomy, interbody fusion, disc space insert L4-L5 L5-S1, posterior spinal fusion and fixation, L5-S1 moderate right foraminal stenosis. 2.4 cm mid right renal cyst unchanged  3/26/13 dr. murphy neurosurgery note, no further surgical intervention necessary, chronic narcotics norco 10 mg 5 per day; I will assume the norco rx from   1/25/15 off naprosyn and cymbalta; on norco 10 mg one every four hours #150  5/21/15 on percocet 10 mg five per day and on cymbalta 60 mg  3/25/16 start lyrica 50 mg tid for fibromyalgia, continue percocet 10 mg 5 per day and cymbalta 60 mg  4/12/16 increase lyrica to 100 mg tid (50 mg two tid), continue cymbalta 60 mg and percocet 10 mg 5 per day  9/26/16 off lyrica, on percocet 10 mg qid per  pain management and cymbalta  7/9/19 on percocet 10 mg #140 per 28 days from  pain management also on cymbalta 60 mg and naprosyn 500 mg bid  10/7/20  pain note on oxycodone #140 per 28 days will decrease to #112 per 28 days  11/4/20  pain note on oxycodone #112 per 28 days      Speech language deficit  1999 affected right arm, no old records  Some diff with short recall, and occasional diff with expressing self when fatigued     Status post knee replacement left 2001     Status post hip replacement bilateral  12/03 right total hip  replacement  1/04 left total hip replacement      Preventative health  4/6/07 colonoscopy [facility] negative  10/26/10 zoster vaccine  [facility]  11/8/18 vit d 46  11/8/18 psa 1.4  12/12/19 tdap  12/12/19 pneumovax  7/14/20 FIT negative  2/27/21 covid pfizer second     Paroxysmal atrial fibrillation  Sees [facility]  2005 s/p maze procedure with a stroke related to that as a complication, have no records at all regarding that, no CT scan or MRI scan and probably had a cardiac catheterization by renal physicians in 2005 that was negative for coronary artery disease  8/11 [facility] note EKG NSR  5/12 [facility] note, on asa daily  11/12 [facility] note  4/29/13 cardiology note  11/3/13 cardiology note on asa  9/22/14 cardiology note sinus, follow up 6 months  5/12/15  cardiology note, paroxysmal atrial fibrillation status post maze operation, sinus rhythm  11/18/15 cardiology note stable follow one year  4/4/17  cardiology note, paroxysmal atrial fibrillation no recurrence, follow-up one year  2/5/19 cardiology note paroxysmal atrial fibrillation, sinus on exam, no recurrence of atrial fibrillation status post maze, follow-up 1 year  6/16/20 cardiology note atrial fibrillation status post maze no recurrence, hypertension stable on current regimen, work on weight loss, follow-up 6 months     opitate rx  3/26/13 dr. murphy neurosurgery note, no further surgical intervention necessary, chronic narcotics norco 10 mg 5 per day; I will assume the norco rx from   12/6/13 change from hydrocodone 10 mg 4-5 tablets per day to oxycodone 10 mg 4-5 tabs per day  1/22/15 UDT millenium consistent  2/24/15 change from hydrocodone 10 mg to oxycodone 10 mg (percocet) qid #150 per 30 days  7/9/19 on percocet 10 mg #140 per 28 days from  pain management also on cymbalta 60 mg and naprosyn 500 mg bid     Insomnia  2009 on ambien 10 mg  2/19/16 unable to taper ambien, referral to behavioral sleep psychology  recommended he declines  9/26/16 on ambien 10 mg work on taper, declines referral to sleep psychology  7/9/18 on ambien declines sleep management referral     Impaired glucose metabolism  11/8/18 A1c 6%  12/6/19 A1c 5.8%      Hypertension  7/10 [facility] note change from hyzaar to enalapril hct 10/25 1/11 [facility] note bp not controlled on enalapril hct 10/25, change back to losartan hct 100/25 and norvasc 10 mg  6/12 K+ 3.0, increase kdur to 20 tid, consider decreasing hctz if possible to decrease K+ supplementation  12/12 cozaar 100 mg, dyazide 37.5/25 mg, kcl 20 meq, norvasc 10 mg  1/29/14 on cozaar 100 mg, dyazide 37.5/25 mg, norvasc 10 mg, klor 20 meq bid; will increase to klor 20 meq tid  9/3//14 K+ 3.1 on klor 20 meq tid, increase to 20 meq two tab bid  9/3/14 urine mac <0.5 on cozaar 100 mg, dyazide 37.5/25 mg, norvasc 10 mg, klor 20 meq two tab bid  6/30/15 Na 132,K+ 3.5 on cozaar 100 mg, dyazide 37.5/25 mg, norvasc 10 mg, klor 20 meq two tab bid  8/31/16 K+ 3.3 on cozaar 100 mg, dyazide 37.5/25 mg, norvasc 10 mg, klor 20 meq two tab qday will increase to 2 bid  7/11/17 K+3.3 on cozaar 100 mg, dyazide 37.5/25 mg, norvasc 10 mg, klor 20 meq two bid  2/5/19 cardiology note paroxysmal atrial fibrillation, sinus on exam, no recurrence of atrial fibrillation status post maze, follow-up 1 year  12/6/19 K+3.5 on cozaar 100 mg, dyazide 37.5/25 mg, norvasc 10 mg, klor 20 meq two bid  6/16/20 cardiology note atrial fibrillation status post maze no recurrence, hypertension stable on current regimen, work on weight loss, follow-up 6 months     History stroke  1999 affected right arm, no old records  Some difficulty with short recall, and occasional diff with expressing self when fatigued     History squamous cell carcinoma  Saw  dermatology offered aldara he declined  3/27/17  dermatology note  5/1/17  dermatology biopsy proven squamous cell carcinoma in situ left forehead[place]  [facility] [place]17  dermatology note continue      Fibromyalgia  3/25/16 start lyrica 50 mg tid for fibromyalgia, continue percocet 10 mg 5 per day and cymbalta 60 mg  4/12/16 increase lyrica to 100 mg tid (50 mg two tid), continue cymbalta 60 mg and percocet 10 mg 5 per day  4/28/16 increase lyrica to 150 mg tid, continue cymbalta 60 mg and percocet 10 mg five times per day  5/10/16 on lyrica 50 mg three tabs tid, change to 100 mg am, 150 mg noon, 100 mg pm     Dyslipidemia  Followed by cardiology  2/09 cholesterol 135, triglycerides 100, HDL 47, LDL 68  8/09 chol 138,trig 101,hdl 49,ldl 69  3/10 chol 123,trig 66,hdl 49,ldl 61  7/10 [facility] note change vytorin to zocor 40  11/10 chol 141,trig 75,hdl 62,ldl 64 on vytorin 10/20 mg per [facility]  10/11 chol 159,trig 102,hdl 52,ldl 87 on vytorin 10/20 per [facility]  2/29/12 stop vytorin, change to zocor 20 mg per [facility]  6/12 chol 152,trig 102,hdl 49,ldl 83 on zocor 20 mg  6/13 chol 150,trig 130,hdl 52,ldl 72 on zocor 20 mg  1/29/14 chol 147,trig 127,hdl 50,ldl 72 on zocor 20 mg  9/3/14 chol 169,trig 228,hdl 49,ldl 74 on zocor 20 mg  6/30/15 chol 151,trig 88,hdl 63,ldl 70 on zocor 20 mg  8/31/16 chol 169,trig 130,hdl 59,ldl 84 on zocor 20 mg  7/11/17 chol 147,trig 146,hdl 51,ldl 67 on zocor 20 mg  11/8/18 chol 189,trig 215,hdl 54,ldl 92 on zocor 20 mg  12/6/19 chol 163,trig 214,hdl 54,ldl 66 on zocor 20 mg     Depression  3/12 tried cymalta for pain no benefit  12/6/13 PHQ 9 score 15, start cymbalta samples 30 mg x 7 days, then 60 mg  12/20/13 cymbalta 60 mg  1/22/15 off cymbalta    5/21/15 on cymbalta 60 mg  2/19/16 referral to behavioral health recommended he declines  11/29/16 depression screening score 0, continues on cymbalta  1/30/18 depression screening score 0 on cymbalta     Chronic opiate  3/26/13 dr. murphy neurosurgery note, no further surgical intervention necessary, chronic narcotics norco 10 mg 5 per day; I will assume the norco rx from   12/6/13 change from  hydrocodone 10 mg 4-5 tablets per day to oxycodone 10 mg 4-5 tabs per day  1/22/15 UDT millenium consistent  2/24/15 change from hydrocodone 10 mg to oxycodone 10 mg (percocet) qid #150 per 30 days  1/22/14 norco 10 mg #180, millenium URT done  2/24/15 change to percocet 10 mg #150 only one prescription provided, if successful for pain relief call us and I will write next 3 refills that he can  and then followup in office after that  5/21/15 percocet 10 mg #150 refill  5/21/15 narcotic contract  5/21/15 opiate risk score 1  8/24/15 refill percocet #150 x 3  8/24/15 referral pain management  10/4/15   2/19/16   2/19/16 increase frequency to percocet 10 mg q 4 hours #180 per month, declined trial of MS contin, because of increasing utilization, referral made to pain management   5/24/16   5/24/16 UDT millennium  5/24/16 refill percocet #150 x 2 refer to  pain management to assume opiate prescription writing   6/22/16  pain management note, initiate pregabalin, work on taper oxycodone  7/6/16  continue oxycodone and pregabalin  8/9/16  pain management note continue oxycodone, consider ultrasound-guided knee injections to right  9/12/16  pain management note, UDT consistent  7/9/19 on percocet 10 mg #140 per 28 days from  pain management also on cymbalta 60 mg and naprosyn 500 mg bid  7/9/19 referral [facility]   7/20/20 on percocet 10 mg 5 per day per  pain management, cymbalta 60 mg, naprosyn 500 mg bid      Cervical pain  3/06 MRI cervical spine moderate subdural frontal stenosis C5-C6 lesser extent C6-C7     Arthritis knee  9/3/14 x-ray right knee marked tricompartmental osteoarthritis, most severe medial compartment  2/19/16 has seen  orthopedics, declines knee replacement         Patient Active Problem List    Diagnosis Date Noted   • Impaired glucose metabolism 11/08/2018   • Fibromyalgia 03/26/2016   • S/P Maze  operation for atrial fibrillation 11/18/2015   • Opiate dependence, continuous (CMS-[facility]) 05/20/2015   • Speech and language deficit, late effect of cerebrovascular disease 05/20/2015   • Arthritis of knee, right 09/03/2014   • Depression, major, in partial remission (HCC) 12/06/2013   • Chronic pain 12/06/2013   • Insomnia 02/08/2013   • History of stroke 08/05/2011   • Plantar fasciitis 11/02/2010   • Obesity 09/17/2009   • S/P hip replacement 09/16/2009   • S/p lumbar surgery 09/16/2009   • Cervical pain 09/16/2009   • Hypertension 09/16/2009   • Paroxysmal atrial fibrillation (HCC) 09/16/2009   • Dyslipidemia 09/16/2009   • History of squamous cell carcinoma 09/16/2009   • Preventative health care 09/16/2009   • S/P knee replacement 09/16/2009       Family History   Problem Relation Age of Onset   • Heart Disease Brother         atrial fib   • Cancer Sister         breast cancer   • Heart Disease Unknown    • Hypertension Unknown    • Stroke Unknown        He  has a past medical history of Aphasia, Arrhythmia, Arthritis, CAD (coronary artery disease), Hypercholesteremia, Hypercholesterolemia (2/13/2012), Hypertension, MEDICAL HOME, Pain, Stroke ([facility]) (1999), and Unspecified cerebral artery occlusion without mention of cerebral infarction (2/13/2012).  He  has a past surgical history that includes hip replacement, total (2002/2004); other cardiac surgery (2000); other abdominal surgery (1981); knee replacement, total (2001); vein ligation (1987); other (1994); other (1999); block epidural steroid injection (2001); colonoscopy (2001/2007); angiogram (1999); eswt (12/9/2009); pr inj dx/ther agnt paravert facet joint, jessica* (11/4/2011); pr inj dx/ther agnt paravert facet joint, ce* (11/4/2011); maze procedure; lumbar fusion posterior (9/26/2012); lumbar decompression (9/26/2012); maze procedure; and knee replacement, total (2002).    Depression Screening    Little interest or pleasure in doing things?  0 - not at  all  Feeling down, depressed , or hopeless? 0 - not at all  Patient Health Questionnaire Score: 0     If depressive symptoms identified deferred to follow up visit unless specifically addressed in assessment and plan.    Interpretation of PHQ-9 Total Score   Score Severity   1-4 No Depression   5-9 Mild Depression   10-14 Moderate Depression   15-19 Moderately Severe Depression   20-27 Severe Depression    Screening for Cognitive Impairment    Three Minute Recall (captain, garden, picture) 3/3    Ken clock face with all 12 numbers and set the hands to show 5 past 8.  No Unable to write due to stroke   Cognitive concerns identified deferred for follow up unless specifically addressed in assessment and plan.    Fall Risk Assessment    Has the patient had two or more falls in the last year or any fall with injury in the last year?  No    Safety Assessment    Throw rugs on floor.  Yes  Handrails on all stairs.  Yes  Good lighting in all hallways.  Yes  Difficulty hearing.  No  Patient counseled about all safety risks that were identified.    Functional Assessment ADLs    Are there any barriers preventing you from cooking for yourself or meeting nutritional needs?  No.    Are there any barriers preventing you from driving safely or obtaining transportation?  No.    Are there any barriers preventing you from using a telephone or calling for help?  No.    Are there any barriers preventing you from shopping?  No.    Are there any barriers preventing you from taking care of your own finances?  No.    Are there any barriers preventing you from managing your medications?  Yes. Wife usually takes care of it  Are there any barriers preventing you from showering, bathing or dressing yourself?  No.    Are you currently engaging in any exercise or physical activity?  No.     What is your perception of your health?  Excellent.      Health Maintenance Summary                HEPATITIS C SCREENING Overdue 1945     Annual Wellness  "Visit Overdue 11/30/2017      Done 11/29/2016 Visit Dx: Medicare annual wellness visit, subsequent     Patient has more history with this topic...    IMM ZOSTER VACCINES Overdue 4/2/2019      Done 2/5/2019 Imm Admin: Zoster Vaccine Recombinant (RZV) (SHINGRIX)     Patient has more history with this topic...    IMM INFLUENZA Next Due 9/1/2021      Done 12/12/2019 Imm Admin: Influenza Vaccine Adult HD     Patient has more history with this topic...    IMM DTaP/Tdap/Td Vaccine Next Due 12/12/2029      Done 12/12/2019 Imm Admin: Tdap Vaccine     Patient has more history with this topic...          Patient Care Team:  Norman Peterson M.D. as PCP - General  Jose Martin Campbell M.D. as Consulting Physician (Anesthesia)  Irineo Skinner M.D. as Consulting Physician (Cardiac Electrophysiology)  Pete Rangel O.D. as Consulting Physician (Optometry)  Reji Roberson as [facility]        Objective:   /78   Pulse 82   Temp 36.5 °C (97.7 °F)   Ht 1.803 m (5' 11\")   Wt 122 kg (270 lb)   BMI 37.66 kg/m²     Physical Exam:   Constitutional: Alert, no distress, well-groomed.  Skin: No rashes in visible areas.  Eye: Round. Conjunctiva clear, lids normal. No icterus.   ENMT: Lips pink without lesions, good dentition, moist mucous membranes. Phonation normal.  Neck: No masses, no thyromegaly. Moves freely without pain.  Respiratory: Unlabored respiratory effort, no cough or audible wheeze  Psych: Alert and oriented x3, normal affect and mood.       Assessment and Plan:   The following treatment plan was discussed:   Assessment  #!  Hypertension stable on losartan 100 mg, amlodipine 10 mg, Dyazide per cardiology    #2 paroxysmal atrial fibrillation, followed by cardiology no recurrence    #3 chronic pain and opiate dependence followed by Dr. Mathias pain management on hydrocodone 10 mg quantity 4/day, Cymbalta     #4 depression major stable in remission on Cymbalta      #5 dyslipidemia on Zocor no " recent labs    #6 BMI 37.66    Plan  #1 obtain old records  pain management patient would like me to resume pain medication prescriptions, I will need to review the old records from pain management first    #2 declines screening colonoscopy from [facility]    #3 shingrix to get the second shot at the pharmacy    #4  Continue blood pressure medications, check blood pressure regularly    #5 continue regular exercise and activity aim to lose the extra 6 pounds that he has gained during the Covid pandemic    #6 follow-up 6 months

## 2021-08-02 NOTE — PROGRESS NOTES

## 2021-08-06 ENCOUNTER — HOSPITAL ENCOUNTER (OUTPATIENT)
Facility: MEDICAL CENTER | Age: 76
End: 2021-08-06
Payer: MEDICARE

## 2021-08-06 PROCEDURE — 82274 ASSAY TEST FOR BLOOD FECAL: CPT

## 2021-08-13 ENCOUNTER — PATIENT MESSAGE (OUTPATIENT)
Dept: MEDICAL GROUP | Facility: MEDICAL CENTER | Age: 76
End: 2021-08-13

## 2021-08-13 LAB — IMM ASSAY OCC BLD FITOB: NEGATIVE

## 2021-08-16 ENCOUNTER — TELEPHONE (OUTPATIENT)
Dept: MEDICAL GROUP | Facility: MEDICAL CENTER | Age: 76
End: 2021-08-16

## 2021-08-16 NOTE — TELEPHONE ENCOUNTER
----- Message from Norman Peterson M.D. sent at 8/13/2021  5:01 PM PDT -----  Please notify the patient that his stool test is negative for blood

## 2021-08-18 ENCOUNTER — PATIENT MESSAGE (OUTPATIENT)
Dept: MEDICAL GROUP | Facility: MEDICAL CENTER | Age: 76
End: 2021-08-18

## 2021-08-18 ENCOUNTER — TELEPHONE (OUTPATIENT)
Dept: MEDICAL GROUP | Facility: MEDICAL CENTER | Age: 76
End: 2021-08-18

## 2021-08-23 ENCOUNTER — HOSPITAL ENCOUNTER (OUTPATIENT)
Dept: LAB | Facility: MEDICAL CENTER | Age: 76
End: 2021-08-23
Attending: INTERNAL MEDICINE
Payer: MEDICARE

## 2021-08-23 DIAGNOSIS — Z12.5 PROSTATE CANCER SCREENING: ICD-10-CM

## 2021-08-23 DIAGNOSIS — E78.5 DYSLIPIDEMIA: Chronic | ICD-10-CM

## 2021-08-23 DIAGNOSIS — R73.09 IMPAIRED GLUCOSE METABOLISM: ICD-10-CM

## 2021-08-23 DIAGNOSIS — E55.9 VITAMIN D DEFICIENCY: ICD-10-CM

## 2021-08-23 DIAGNOSIS — Z11.59 NEED FOR HEPATITIS C SCREENING TEST: ICD-10-CM

## 2021-08-23 LAB
ALBUMIN SERPL BCP-MCNC: 4 G/DL (ref 3.2–4.9)
ALBUMIN/GLOB SERPL: 1.4 G/DL
ALP SERPL-CCNC: 81 U/L (ref 30–99)
ALT SERPL-CCNC: 16 U/L (ref 2–50)
ANION GAP SERPL CALC-SCNC: 10 MMOL/L (ref 7–16)
AST SERPL-CCNC: 23 U/L (ref 12–45)
BASOPHILS # BLD AUTO: 0.4 % (ref 0–1.8)
BASOPHILS # BLD: 0.03 K/UL (ref 0–0.12)
BILIRUB SERPL-MCNC: 0.6 MG/DL (ref 0.1–1.5)
BUN SERPL-MCNC: 11 MG/DL (ref 8–22)
CALCIUM SERPL-MCNC: 9.8 MG/DL (ref 8.4–10.2)
CHLORIDE SERPL-SCNC: 99 MMOL/L (ref 96–112)
CHOLEST SERPL-MCNC: 166 MG/DL (ref 100–199)
CO2 SERPL-SCNC: 26 MMOL/L (ref 20–33)
CREAT SERPL-MCNC: 0.58 MG/DL (ref 0.5–1.4)
EOSINOPHIL # BLD AUTO: 0.13 K/UL (ref 0–0.51)
EOSINOPHIL NFR BLD: 1.5 % (ref 0–6.9)
ERYTHROCYTE [DISTWIDTH] IN BLOOD BY AUTOMATED COUNT: 42.2 FL (ref 35.9–50)
EST. AVERAGE GLUCOSE BLD GHB EST-MCNC: 111 MG/DL
FASTING STATUS PATIENT QL REPORTED: NORMAL
GLOBULIN SER CALC-MCNC: 2.8 G/DL (ref 1.9–3.5)
GLUCOSE SERPL-MCNC: 116 MG/DL (ref 65–99)
HBA1C MFR BLD: 5.5 % (ref 4–5.6)
HCT VFR BLD AUTO: 47.4 % (ref 42–52)
HDLC SERPL-MCNC: 57 MG/DL
HGB BLD-MCNC: 16.7 G/DL (ref 14–18)
IMM GRANULOCYTES # BLD AUTO: 0.02 K/UL (ref 0–0.11)
IMM GRANULOCYTES NFR BLD AUTO: 0.2 % (ref 0–0.9)
LDLC SERPL CALC-MCNC: 81 MG/DL
LYMPHOCYTES # BLD AUTO: 2.41 K/UL (ref 1–4.8)
LYMPHOCYTES NFR BLD: 28.7 % (ref 22–41)
MCH RBC QN AUTO: 32.6 PG (ref 27–33)
MCHC RBC AUTO-ENTMCNC: 35.2 G/DL (ref 33.7–35.3)
MCV RBC AUTO: 92.4 FL (ref 81.4–97.8)
MONOCYTES # BLD AUTO: 0.66 K/UL (ref 0–0.85)
MONOCYTES NFR BLD AUTO: 7.9 % (ref 0–13.4)
NEUTROPHILS # BLD AUTO: 5.14 K/UL (ref 1.82–7.42)
NEUTROPHILS NFR BLD: 61.3 % (ref 44–72)
NRBC # BLD AUTO: 0 K/UL
NRBC BLD-RTO: 0 /100 WBC
PLATELET # BLD AUTO: 229 K/UL (ref 164–446)
PMV BLD AUTO: 8.6 FL (ref 9–12.9)
POTASSIUM SERPL-SCNC: 3.4 MMOL/L (ref 3.6–5.5)
PROT SERPL-MCNC: 6.8 G/DL (ref 6–8.2)
RBC # BLD AUTO: 5.13 M/UL (ref 4.7–6.1)
SODIUM SERPL-SCNC: 135 MMOL/L (ref 135–145)
TRIGL SERPL-MCNC: 142 MG/DL (ref 0–149)
TSH SERPL DL<=0.005 MIU/L-ACNC: 3.25 UIU/ML (ref 0.38–5.33)
WBC # BLD AUTO: 8.4 K/UL (ref 4.8–10.8)

## 2021-08-23 PROCEDURE — 84443 ASSAY THYROID STIM HORMONE: CPT

## 2021-08-23 PROCEDURE — 80053 COMPREHEN METABOLIC PANEL: CPT

## 2021-08-23 PROCEDURE — 85025 COMPLETE CBC W/AUTO DIFF WBC: CPT

## 2021-08-23 PROCEDURE — 86803 HEPATITIS C AB TEST: CPT

## 2021-08-23 PROCEDURE — 83036 HEMOGLOBIN GLYCOSYLATED A1C: CPT

## 2021-08-23 PROCEDURE — 84153 ASSAY OF PSA TOTAL: CPT

## 2021-08-23 PROCEDURE — 82306 VITAMIN D 25 HYDROXY: CPT

## 2021-08-23 PROCEDURE — 36415 COLL VENOUS BLD VENIPUNCTURE: CPT

## 2021-08-23 PROCEDURE — 80061 LIPID PANEL: CPT

## 2021-08-24 ENCOUNTER — TELEPHONE (OUTPATIENT)
Dept: MEDICAL GROUP | Facility: MEDICAL CENTER | Age: 76
End: 2021-08-24

## 2021-08-24 LAB
25(OH)D3 SERPL-MCNC: 72 NG/ML (ref 30–100)
HCV AB SER QL: NORMAL
PSA SERPL-MCNC: 2.28 NG/ML (ref 0–4)

## 2021-08-24 NOTE — TELEPHONE ENCOUNTER
Notified with results, continue statin, vitamin D supplementation, potassium still low on Maxide on K-Felisha 20 mEq 2 tablets twice a day per cardiology recommend increasing to 1 pill midday in addition to 2 tablets twice daily so 5 tablets total per day he can check with cardiology who prescribes the medication.  We will schedule follow-up visit, perhaps we could consider changing from the diuretic to a different blood pressure medication that way he can decrease the amount of potassium.  A1c decreased, continue limit sweets, and processed foods.

## 2021-08-26 ENCOUNTER — PATIENT MESSAGE (OUTPATIENT)
Dept: CARDIOLOGY | Facility: MEDICAL CENTER | Age: 76
End: 2021-08-26

## 2021-09-10 DIAGNOSIS — I48.0 PAF (PAROXYSMAL ATRIAL FIBRILLATION) (HCC): ICD-10-CM

## 2021-09-10 DIAGNOSIS — I10 ESSENTIAL HYPERTENSION: ICD-10-CM

## 2021-09-13 RX ORDER — AMLODIPINE BESYLATE 10 MG/1
TABLET ORAL
Qty: 100 TABLET | Refills: 3 | Status: SHIPPED | OUTPATIENT
Start: 2021-09-13 | End: 2022-09-06

## 2021-09-13 RX ORDER — LOSARTAN POTASSIUM 100 MG/1
TABLET ORAL
Qty: 100 TABLET | Refills: 3 | Status: SHIPPED | OUTPATIENT
Start: 2021-09-13 | End: 2022-10-21

## 2021-09-13 RX ORDER — TRIAMTERENE AND HYDROCHLOROTHIAZIDE 37.5; 25 MG/1; MG/1
CAPSULE ORAL
Qty: 100 CAPSULE | Refills: 3 | Status: SHIPPED
Start: 2021-09-13 | End: 2022-01-05

## 2021-10-07 ENCOUNTER — OFFICE VISIT (OUTPATIENT)
Dept: MEDICAL GROUP | Facility: MEDICAL CENTER | Age: 76
End: 2021-10-07
Payer: MEDICARE

## 2021-10-07 VITALS
DIASTOLIC BLOOD PRESSURE: 70 MMHG | WEIGHT: 277 LBS | SYSTOLIC BLOOD PRESSURE: 138 MMHG | HEART RATE: 101 BPM | BODY MASS INDEX: 39.65 KG/M2 | HEIGHT: 70 IN | OXYGEN SATURATION: 91 % | TEMPERATURE: 98.7 F

## 2021-10-07 DIAGNOSIS — M54.9 BACK PAIN, UNSPECIFIED BACK LOCATION, UNSPECIFIED BACK PAIN LATERALITY, UNSPECIFIED CHRONICITY: ICD-10-CM

## 2021-10-07 DIAGNOSIS — F11.90 CHRONIC NARCOTIC USE: ICD-10-CM

## 2021-10-07 DIAGNOSIS — M25.569 KNEE PAIN: ICD-10-CM

## 2021-10-07 DIAGNOSIS — G47.00 INSOMNIA: ICD-10-CM

## 2021-10-07 DIAGNOSIS — F11.20 OPIATE DEPENDENCE, CONTINUOUS (HCC): Chronic | ICD-10-CM

## 2021-10-07 DIAGNOSIS — G89.29 OTHER CHRONIC PAIN: ICD-10-CM

## 2021-10-07 DIAGNOSIS — Z00.00 PREVENTATIVE HEALTH CARE: Chronic | ICD-10-CM

## 2021-10-07 DIAGNOSIS — Z23 NEED FOR SHINGLES VACCINE: ICD-10-CM

## 2021-10-07 DIAGNOSIS — M54.50 MIDLINE LOW BACK PAIN WITHOUT SCIATICA: ICD-10-CM

## 2021-10-07 DIAGNOSIS — M25.559 HIP PAIN: ICD-10-CM

## 2021-10-07 DIAGNOSIS — E66.01 CLASS 3 SEVERE OBESITY DUE TO EXCESS CALORIES WITHOUT SERIOUS COMORBIDITY IN ADULT, UNSPECIFIED BMI (HCC): ICD-10-CM

## 2021-10-07 PROCEDURE — 90471 IMMUNIZATION ADMIN: CPT | Performed by: INTERNAL MEDICINE

## 2021-10-07 PROCEDURE — 99213 OFFICE O/P EST LOW 20 MIN: CPT | Mod: 25 | Performed by: INTERNAL MEDICINE

## 2021-10-07 PROCEDURE — 90750 HZV VACC RECOMBINANT IM: CPT | Performed by: INTERNAL MEDICINE

## 2021-10-07 RX ORDER — OXYCODONE AND ACETAMINOPHEN 10; 325 MG/1; MG/1
1 TABLET ORAL EVERY 6 HOURS PRN
Qty: 120 TABLET | Refills: 0 | Status: SHIPPED | OUTPATIENT
Start: 2021-10-13 | End: 2022-01-13 | Stop reason: SDUPTHER

## 2021-10-07 RX ORDER — OXYCODONE AND ACETAMINOPHEN 10; 325 MG/1; MG/1
1 TABLET ORAL EVERY 6 HOURS PRN
Qty: 120 TABLET | Refills: 0 | Status: SHIPPED | OUTPATIENT
Start: 2021-11-12 | End: 2021-12-12

## 2021-10-07 RX ORDER — OXYCODONE AND ACETAMINOPHEN 10; 325 MG/1; MG/1
1 TABLET ORAL EVERY 6 HOURS PRN
Qty: 120 TABLET | Refills: 0 | Status: SHIPPED | OUTPATIENT
Start: 2021-12-12 | End: 2022-01-11

## 2021-10-07 ASSESSMENT — PATIENT HEALTH QUESTIONNAIRE - PHQ9
5. POOR APPETITE OR OVEREATING: NOT AT ALL
SUM OF ALL RESPONSES TO PHQ QUESTIONS 1-9: 0
4. FEELING TIRED OR HAVING LITTLE ENERGY: NOT AT ALL
7. TROUBLE CONCENTRATING ON THINGS, SUCH AS READING THE NEWSPAPER OR WATCHING TELEVISION: NOT AT ALL
1. LITTLE INTEREST OR PLEASURE IN DOING THINGS: NOT AT ALL
6. FEELING BAD ABOUT YOURSELF - OR THAT YOU ARE A FAILURE OR HAVE LET YOURSELF OR YOUR FAMILY DOWN: NOT AL ALL
9. THOUGHTS THAT YOU WOULD BE BETTER OFF DEAD, OR OF HURTING YOURSELF: NOT AT ALL
SUM OF ALL RESPONSES TO PHQ9 QUESTIONS 1 AND 2: 0
3. TROUBLE FALLING OR STAYING ASLEEP OR SLEEPING TOO MUCH: NOT AT ALL
2. FEELING DOWN, DEPRESSED, IRRITABLE, OR HOPELESS: NOT AT ALL
8. MOVING OR SPEAKING SO SLOWLY THAT OTHER PEOPLE COULD HAVE NOTICED. OR THE OPPOSITE, BEING SO FIGETY OR RESTLESS THAT YOU HAVE BEEN MOVING AROUND A LOT MORE THAN USUAL: NOT AT ALL

## 2021-10-07 ASSESSMENT — FIBROSIS 4 INDEX: FIB4 SCORE: 1.88

## 2021-10-07 NOTE — PROGRESS NOTES
Subjective     Robert Gonzalez is a 76 y.o. male who presents with leg pain Follow-Up            HPI   Patient is here with his wife for medication refill.  Chronic low back pain, leg and knee pain, followed by pain management Dr. Mathias, had been on 10 mg 4 times a day quantity 112 for 28 days, with control of chronic back and leg pain status post lumbar surgery.  He would like to have me resume any his prescriptions as I had done previously, he has done a good job being able to taper down from quantity 140 every 28 days to #112 per 28 days.  No drowsiness, sedation, memory loss, depression or mood changes with the medication.  Medication allows him to function and perform ADLs.  Tries to keep active milliliter exercise program, lives with his wife who provides good social support, has been doing a good job for better job with nutrition on to recently.  Most recent labs in August showed improved A1c 5.5%.  Blood pressure has been stable 130/70, on losartan, Dyazide, amlodipine as well as potassium supplementation.         Current Outpatient Medications   Medication Sig Dispense Refill   • amLODIPine (NORVASC) 10 MG Tab TAKE ONE TABLET BY MOUTH DAILY 100 Tablet 3   • losartan (COZAAR) 100 MG Tab TAKE ONE TABLET BY MOUTH DAILY 100 Tablet 3   • triamterene/hctz (MAXZIDE-25/DYAZIDE) 37.5-25 MG Cap TAKE ONE CAPSULE BY MOUTH DAILY 100 Capsule 3   • zolpidem (AMBIEN) 10 MG Tab Take 1 Tablet by mouth at bedtime as needed for Sleep for up to 90 days. 90 Tablet 0   • ibuprofen (MOTRIN) 400 MG Tab      • loratadine (CLARITIN) 10 MG Tab      • simvastatin (ZOCOR) 20 MG Tab TAKE ONE TABLET BY MOUTH EVERY EVENING 100 tablet 3   • KLOR-CON M20 20 MEQ Tab CR TAKE TWO TABLETS BY MOUTH TWICE A  Tab 3   • DULoxetine (CYMBALTA) 60 MG Cap DR Particles delayed-release capsule TAKE ONE CAPSULE BY MOUTH DAILY 90 Cap 3   • naproxen (NAPROSYN) 500 MG Tab TAKE ONE TABLET BY MOUTH TWICE A DAY AS NEEDED FOR PAIN 180 Tab 3   •  Multiple Vitamins-Minerals (MULTI COMPLETE PO) Take  by mouth.     • diphenhydrAMINE (BENADRYL) 25 MG Tab Take 25 mg by mouth every 6 hours as needed for Sleep.     • Oxycodone-Acetaminophen (PERCOCET-10)  MG Tab Take 1 Tab by mouth every four hours as needed for Moderate Pain. Ok to refill percocet 28-30 days after the percocet written on 5/24/16 is filled 180 Tab 0   • aspirin (ASA) 81 MG Chew Tab chewable tablet Take 1 Tab by mouth every day. 100 Tab 11   • polyethylene glycol/lytes (MIRALAX) PACK Take 17 g by mouth every day.     • docusate sodium (COLACE) 100 MG CAPS Take 100 mg by mouth every day.     • vitamin D (CHOLECALCIFEROL) 1000 UNIT TABS Take 6,000 Units by mouth every day.       No current facility-administered medications for this visit.     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    IMM ZOSTER VACCINES Overdue 4/2/2019      Done 2/5/2019 Imm Admin: Zoster Vaccine Recombinant (RZV) (SHINGRIX)     Patient has more history with this topic...    IMM INFLUENZA Overdue 9/1/2021      Done 12/12/2019 Imm Admin: Influenza Vaccine Adult HD     Patient has more history with this topic...     "IMM DTaP/Tdap/Td Vaccine Next Due 12/12/2029      Done 12/12/2019 Imm Admin: Tdap Vaccine     Patient has more history with this topic...          Patient Care Team:  Norman Peterson M.D. as PCP - General  Jose Martin Campbell M.D. as Consulting Physician (Anesthesiology)  Irineo Skinner M.D. as Consulting Physician (Internal Medicine Clinical Cardiac Electrophysiology)  Pete Rangel O.D. as Consulting Physician (Optometry)  Reji oRberson as [facility]       ROS           Objective     /70 (BP Location: Left arm, Patient Position: Sitting, BP Cuff Size: Adult)   Pulse (!) 101   Temp 37.1 °C (98.7 °F)   Ht 1.778 m (5' 10\")   Wt (!) 126 kg (277 lb)   SpO2 91%   BMI 39.75 kg/m²      Physical Exam  Vitals and nursing note reviewed.   Constitutional:       Appearance: Normal appearance.   HENT:      Head: Normocephalic and atraumatic.      Right Ear: External ear normal.      Left Ear: External ear normal.   Eyes:      Conjunctiva/sclera: Conjunctivae normal.   Cardiovascular:      Rate and Rhythm: Normal rate and regular rhythm.   Pulmonary:      Effort: Pulmonary effort is normal.      Breath sounds: Normal breath sounds.   Abdominal:      General: There is no distension.   Skin:     General: Skin is warm.   Neurological:      General: No focal deficit present.      Mental Status: He is alert.   Psychiatric:         Mood and Affect: Mood normal.         Behavior: Behavior normal.         Thought Content: Thought content normal.                             Assessment & Plan        Assessment  #1 chronic low back pain and leg pain followed by pain management Dr. Mathias, had been on Percocet 10 mg 1 p.o. 4 times a day quantity 112 tablets per 28 days, stable and controlled on that regimen without side effects of drowsiness, sedation, memory loss, depression, constipation, no alcohol medication.  Good social support with wife.    #2 chronic opiate therapy Percocet 10 mg 1 tablet 4 " times a day, has tried NSAIDs, hydrocodone, injections, physical therapy and seen by pain management    #3 hypertension on losartan, Dyazide, amlodipine, potassium    #4 dyslipidemia on Zocor most recent cholesterol 166, triglycerides 142, HDL 57, LDL 81 on August 24    #5 BMI 39.5 working on his nutrition, regular exercise    Plan  #! Refill october 13 due for percocet 10 mg 4 per day #30 days, quantity 120 tablets per 30 days, prescription for the next 3 months sent to pharmacy electronically, understands long-term risk drowsiness, sedation, memory loss, depression, no side effects with medication, I believe benefits outweigh the risk, has been followed by pain management,  reviewed, continue no alcohol with medication, follow-up 3 months    #2 will have flu vaccine at the pharmacy     #3 shingrix vaccine second in the series today    #4 up-to-date on his Covid vaccine    #5 work on nutrition, improving his nutritional choices and portion sizes    #6 check blood pressure periodically    #7 repeat labs next visit

## 2021-10-23 DIAGNOSIS — I10 HYPERTENSION, UNSPECIFIED TYPE: ICD-10-CM

## 2021-10-23 RX ORDER — POTASSIUM CHLORIDE 20 MEQ/1
TABLET, EXTENDED RELEASE ORAL
Qty: 450 TABLET | Refills: 2 | Status: SHIPPED | OUTPATIENT
Start: 2021-10-23 | End: 2022-07-12

## 2021-11-21 ENCOUNTER — TELEPHONE (OUTPATIENT)
Dept: MEDICAL GROUP | Facility: MEDICAL CENTER | Age: 76
End: 2021-11-21

## 2021-11-21 DIAGNOSIS — G47.09 OTHER INSOMNIA: ICD-10-CM

## 2021-11-21 RX ORDER — ZOLPIDEM TARTRATE 10 MG/1
10 TABLET ORAL NIGHTLY PRN
Qty: 90 TABLET | Refills: 1 | Status: SHIPPED | OUTPATIENT
Start: 2021-11-21 | End: 2022-02-19

## 2021-11-22 NOTE — TELEPHONE ENCOUNTER
----- Message from Brooklynn Amador sent at 11/19/2021 11:20 AM PST -----  Regarding: FW: Change pharmacy for zolpidem prescription    ----- Message -----  From: Julio Gonzalez  Sent: 11/19/2021  10:42 AM PST  To: Bianca Krause  Subject: Change pharmacy for zolpidem prescription        Hi Doctor,    Robert needs a refill of zolpidem 10 mg. We would like to change his prescription from [facility]. He is currently getting a 3 month's supply - (90) pills.   Please reply when this is done.    Thanks so much,  Juliana (for  Robert)

## 2022-01-04 ENCOUNTER — OFFICE VISIT (OUTPATIENT)
Dept: MEDICAL GROUP | Facility: MEDICAL CENTER | Age: 77
End: 2022-01-04
Payer: MEDICARE

## 2022-01-04 VITALS
SYSTOLIC BLOOD PRESSURE: 142 MMHG | DIASTOLIC BLOOD PRESSURE: 80 MMHG | WEIGHT: 272 LBS | HEIGHT: 70 IN | BODY MASS INDEX: 38.94 KG/M2 | HEART RATE: 106 BPM | TEMPERATURE: 98.7 F | OXYGEN SATURATION: 90 %

## 2022-01-04 DIAGNOSIS — G47.00 INSOMNIA, UNSPECIFIED TYPE: ICD-10-CM

## 2022-01-04 DIAGNOSIS — G89.29 OTHER CHRONIC PAIN: ICD-10-CM

## 2022-01-04 DIAGNOSIS — Z79.891 CHRONIC PRESCRIPTION OPIATE USE: ICD-10-CM

## 2022-01-04 DIAGNOSIS — F33.41 RECURRENT MAJOR DEPRESSIVE DISORDER, IN PARTIAL REMISSION (HCC): Chronic | ICD-10-CM

## 2022-01-04 DIAGNOSIS — F11.20 OPIATE DEPENDENCE, CONTINUOUS (HCC): ICD-10-CM

## 2022-01-04 DIAGNOSIS — Z98.890 STATUS POST LUMBAR SURGERY: Chronic | ICD-10-CM

## 2022-01-04 DIAGNOSIS — M54.50 LOW BACK PAIN, UNSPECIFIED BACK PAIN LATERALITY, UNSPECIFIED CHRONICITY, UNSPECIFIED WHETHER SCIATICA PRESENT: ICD-10-CM

## 2022-01-04 DIAGNOSIS — F32.5 DEPRESSION, MAJOR, IN REMISSION (HCC): ICD-10-CM

## 2022-01-04 DIAGNOSIS — I10 PRIMARY HYPERTENSION: ICD-10-CM

## 2022-01-04 DIAGNOSIS — I48.0 PAROXYSMAL ATRIAL FIBRILLATION (HCC): Chronic | ICD-10-CM

## 2022-01-04 PROCEDURE — 99214 OFFICE O/P EST MOD 30 MIN: CPT | Performed by: INTERNAL MEDICINE

## 2022-01-04 ASSESSMENT — FIBROSIS 4 INDEX: FIB4 SCORE: 1.88

## 2022-01-04 ASSESSMENT — PATIENT HEALTH QUESTIONNAIRE - PHQ9: CLINICAL INTERPRETATION OF PHQ2 SCORE: 0

## 2022-01-04 NOTE — NON-PROVIDER
Annual Health Assessment Questions:    1.  Are you currently engaging in any exercise or physical activity? Yes    2.  How would you describe your mood or emotional well-being today? anxious    3.  Have you had any falls in the last year? No    4.  Have you noticed any problems with your balance or had difficulty walking? No    5.  In the last six months have you experienced any leakage of urine? No    6. DPA/Advanced Directive: Patient has Living Will on file.

## 2022-01-04 NOTE — PROGRESS NOTES
Subjective     Julio Gonzalez is a 76 y.o. male who presents with med refill        HPI   Patient is here for pain medication refill chronic back pain status post lumbar surgery 2012, as well as knee replacement and hip arthroplasty, has seen neurosurgery, physical therapy, pain management Dr. Mathias, I resumed refill of his Percocet 10 mg 4 times a day this past year.  He has been stable on the medication Percocet 10 mg 4 tablets/day.  Medication controls his pain without significant side effects.  Medication allows him to function during the day.  He tries to keep active and even tried to shovel snow without overdoing it.  Typically it is pain level is at an 8 but the oxycodone medication does help keep the pain from getting worse and over the years he has been able to decrease the oxycodone from 6 tablets a day to 4 tablets a day at his current usage having seen pain management.  Tries to keep active but no regular exercise but the medication allows him to at least perform some activity without significant side effects. Oxycodone does not cause drowsiness, sedation, memory loss, confusion with regular usage.  Takes naproxen 500 mg twice a day as needed, also remains on duloxetine.  He has some constipation with the oxycodone but is able to take MiraLAX daily with some benefit.  Ambien nightly with no daytime drowsiness, still difficulty with sleep maintenance, awakening every 2 hours or so.  But the Ambien is beneficial and at least providing some sleep. Blood pressure at home runs 130s/80s on losartan, amlodipine, Dyazide  No coffee, no soda, no etoh, drinks diet ice tea, water, and 2% milk, tries to avoid salty foods  Medications, allergies, medical history, surgical history, social history, family history  reviewed and updated  Depression stable no current symptoms he is on cymbalta for pain   Sees cardiology for paroxysmal atrial fibrillation       Current Outpatient Medications   Medication Sig Dispense  Refill   • naproxen (NAPROSYN) 500 MG Tab TAKE ONE TABLET BY MOUTH TWICE A DAY AS NEEDED FOR PAIN -GENERIC FOR NAPROSYN 180 Tablet 3   • DULoxetine (CYMBALTA) 60 MG Cap DR Particles delayed-release capsule TAKE ONE CAPSULE BY MOUTH DAILY 90 Capsule 3   • zolpidem (AMBIEN) 10 MG Tab Take 1 Tablet by mouth at bedtime as needed for Sleep for up to 90 days. 90 Tablet 1   • potassium chloride SA (KLOR-CON M20) 20 MEQ Tab CR Take 2 po qam, 1 po at noon, 2 po qpm 450 Tablet 2   • oxyCODONE-acetaminophen (PERCOCET-10)  MG Tab Take 1 Tablet by mouth every 6 hours as needed for Moderate Pain for up to 30 days. Indications: low back pain 120 Tablet 0   • amLODIPine (NORVASC) 10 MG Tab TAKE ONE TABLET BY MOUTH DAILY 100 Tablet 3   • losartan (COZAAR) 100 MG Tab TAKE ONE TABLET BY MOUTH DAILY 100 Tablet 3   • triamterene/hctz (MAXZIDE-25/DYAZIDE) 37.5-25 MG Cap TAKE ONE CAPSULE BY MOUTH DAILY 100 Capsule 3   • ibuprofen (MOTRIN) 400 MG Tab      • loratadine (CLARITIN) 10 MG Tab      • simvastatin (ZOCOR) 20 MG Tab TAKE ONE TABLET BY MOUTH EVERY EVENING 100 tablet 3   • Multiple Vitamins-Minerals (MULTI COMPLETE PO) Take  by mouth.     • diphenhydrAMINE (BENADRYL) 25 MG Tab Take 25 mg by mouth every 6 hours as needed for Sleep.     • aspirin (ASA) 81 MG Chew Tab chewable tablet Take 1 Tab by mouth every day. 100 Tab 11   • polyethylene glycol/lytes (MIRALAX) PACK Take 17 g by mouth every day.     • docusate sodium (COLACE) 100 MG CAPS Take 100 mg by mouth every day.     • vitamin D (CHOLECALCIFEROL) 1000 UNIT TABS Take 6,000 Units by mouth every day.       No current facility-administered medications for this visit.         Status post lumbar surgery  2/01 MRI lumbar spine DJD L5-S1 anterolisthesis 4-5 mm marked stenosis saw  neurosurgery  9/09  neurosurgery note  12/09 neurosurgery note, severe left-sided foraminal stenosis, L5-S1 spondylolisthesis 4-5 mm recommend surgery, patient declined, has had  epidural with no benefit,tried lyrica and neurontin before, declines cymbalta trial  4/10 note dr. johnson neurosurgery who gives patient norco, he is retiring, and has offered patient surgical therapy versus continuing norco 10 mg which we'll assume dispensing.  10/11 MRI lumbar spine grade 1 spondylolisthesis 9mm, bilateral spondylosis L5, moderate bilateral L5 stenosis, 2.4 cm left kidney cyst  10/11 xray LS, grade 1 spondylolisthesis L5 on S1 increased from prior exam, 1.3 cm, no significant change in flexion  11/11  pain note right L5-S1 transforaminal epidural  1/16/12 start cymbalta trial (3/12 stop cymbalta no benefit)  1/12 dr. murphy neurosurgery note surgical intervention offered L4 to sacrum decompression  3/26/12 restarted cymbalta    9/26/12  neurosurgery operative note decompression at L5-S1and bilateral foraminotomies,transforaminal lumbar interbody fusion, L4-L5 and L5-S1, with expandable cage 8-12 mm.  2/20/13 dr. murphy neurosurgery note, lumbar films, EMG NCS lower ext inconclusive, repeat MRI lumbar spine pending  3/13/13 MRI lumbar spine postoperative changes L4-S1 lumbar laminectomy, interbody fusion, disc space insert L4-L5 L5-S1, posterior spinal fusion and fixation, L5-S1 moderate right foraminal stenosis. 2.4 cm mid right renal cyst unchanged  3/26/13 dr. murphy neurosurgery note, no further surgical intervention necessary, chronic narcotics norco 10 mg 5 per day; I will assume the norco rx from   1/25/15 off naprosyn and cymbalta; on norco 10 mg one every four hours #150  5/21/15 on percocet 10 mg five per day and on cymbalta 60 mg  3/25/16 start lyrica 50 mg tid for fibromyalgia, continue percocet 10 mg 5 per day and cymbalta 60 mg  4/12/16 increase lyrica to 100 mg tid (50 mg two tid), continue cymbalta 60 mg and percocet 10 mg 5 per day  9/26/16 off lyrica, on percocet 10 mg qid per  pain management and cymbalta  7/9/19 on percocet 10 mg #140 per  28 days from  pain management also on cymbalta 60 mg and naprosyn 500 mg bid  10/7/20  pain note on oxycodone #140 per 28 days will decrease to #112 per 28 days  11/4/20  pain note on oxycodone #112 per 28 days      Speech language deficit  1999 affected right arm, no old records  Some diff with short recall, and occasional diff with expressing self when fatigued     Status post knee replacement left 2001     Status post hip replacement bilateral  12/03 right total hip replacement  1/04 left total hip replacement      Preventative health  4/6/07 colonoscopy [facility] negative  10/26/10 zoster vaccine  8/27/15 prevnar at [facility]  12/12/19 tdap  12/12/19 pneumovax  2/27/21 covid pfizer second  8/2/21 declines colon  8/13/21 FIT negative  8/24/21 psa 2.2  8/24/21 vit d 72  8/24/21 hep c ab negative  10/7/21 shingrix second  10/14/21 covid pfizer booster  10/14/21 flu     Paroxysmal atrial fibrillation  Sees [facility]  2005 s/p maze procedure with a stroke related to that as a complication, have no records at all regarding that, no CT scan or MRI scan and probably had a cardiac catheterization by renal physicians in 2005 that was negative for coronary artery disease  8/11 [facility] note EKG NSR  5/12 [facility] note, on asa daily  11/12 [facility] note  4/29/13 cardiology note  11/3/13 cardiology note on asa  9/22/14 cardiology note sinus, follow up 6 months  5/12/15  cardiology note, paroxysmal atrial fibrillation status post maze operation, sinus rhythm  11/18/15 cardiology note stable follow one year  4/4/17  cardiology note, paroxysmal atrial fibrillation no recurrence, follow-up one year  2/5/19 cardiology note paroxysmal atrial fibrillation, sinus on exam, no recurrence of atrial fibrillation status post maze, follow-up 1 year  6/16/20 cardiology note atrial fibrillation status post maze no recurrence, hypertension stable on current regimen, work on weight loss, follow-up 6 months  7/13/21   cardiology note     opitate rx  3/26/13 dr. murphy neurosurgery note, no further surgical intervention necessary, chronic narcotics norco 10 mg 5 per day; I will assume the norco rx from   12/6/13 change from hydrocodone 10 mg 4-5 tablets per day to oxycodone 10 mg 4-5 tabs per day  1/22/15 UDT millenium consistent  2/24/15 change from hydrocodone 10 mg to oxycodone 10 mg (percocet) qid #150 per 30 days  7/9/19 on percocet 10 mg #140 per 28 days from  pain management also on cymbalta 60 mg and naprosyn 500 mg bid     Insomnia  2009 on ambien 10 mg  2/19/16 unable to taper ambien, referral to behavioral sleep psychology recommended he declines  9/26/16 on ambien 10 mg work on taper, declines referral to sleep psychology  7/9/18 on ambien declines sleep management referral     Impaired glucose metabolism  11/8/18 A1c 6%  12/6/19 A1c 5.8%  8/24/21 A1c 5.5%      Hypertension  7/10 [facility] note change from hyzaar to enalapril hct 10/25  1/11 [facility] note bp not controlled on enalapril hct 10/25, change back to losartan hct 100/25 and norvasc 10 mg  6/12 K+ 3.0, increase kdur to 20 tid, consider decreasing hctz if possible to decrease K+ supplementation  12/12 cozaar 100 mg, dyazide 37.5/25 mg, kcl 20 meq, norvasc 10 mg  1/29/14 on cozaar 100 mg, dyazide 37.5/25 mg, norvasc 10 mg, klor 20 meq bid; will increase to klor 20 meq tid  9/3//14 K+ 3.1 on klor 20 meq tid, increase to 20 meq two tab bid  9/3/14 urine mac <0.5 on cozaar 100 mg, dyazide 37.5/25 mg, norvasc 10 mg, klor 20 meq two tab bid  6/30/15 Na 132,K+ 3.5 on cozaar 100 mg, dyazide 37.5/25 mg, norvasc 10 mg, klor 20 meq two tab bid  8/31/16 K+ 3.3 on cozaar 100 mg, dyazide 37.5/25 mg, norvasc 10 mg, klor 20 meq two tab qday will increase to 2 bid  7/11/17 K+3.3 on cozaar 100 mg, dyazide 37.5/25 mg, norvasc 10 mg, klor 20 meq two bid  2/5/19 cardiology note paroxysmal atrial fibrillation, sinus on exam, no recurrence of atrial fibrillation  status post maze, follow-up 1 year  12/6/19 K+3.5 on cozaar 100 mg, dyazide 37.5/25 mg, norvasc 10 mg, klor 20 meq two bid  6/16/20 cardiology note atrial fibrillation status post maze no recurrence, hypertension stable on current regimen, work on weight loss, follow-up 6 months  7/13/21  cardiology note on cozaar 100 mg, dyazide 37.5/25 mg, norvasc 10 mg, klor 20 meq two bid  8/24/21 K+ 3.4 on klor 20 meq two bid recommend increase to 2 am, 1 noon, 2 pm     History stroke  1999 affected right arm, no old records  Some difficulty with short recall, and occasional diff with expressing self when fatigued     History squamous cell carcinoma  Saw  dermatology offered aldara he declined  3/27/17  dermatology note  5/1/17  dermatology biopsy proven squamous cell carcinoma in situ left forehead, here for mohs  9/19/17  dermatology note continue      Fibromyalgia  3/25/16 start lyrica 50 mg tid for fibromyalgia, continue percocet 10 mg 5 per day and cymbalta 60 mg  4/12/16 increase lyrica to 100 mg tid (50 mg two tid), continue cymbalta 60 mg and percocet 10 mg 5 per day  4/28/16 increase lyrica to 150 mg tid, continue cymbalta 60 mg and percocet 10 mg five times per day  5/10/16 on lyrica 50 mg three tabs tid, change to 100 mg am, 150 mg noon, 100 mg pm     Dyslipidemia  Followed by cardiology  2/09 cholesterol 135, triglycerides 100, HDL 47, LDL 68  8/09 chol 138,trig 101,hdl 49,ldl 69  3/10 chol 123,trig 66,hdl 49,ldl 61  7/10 [facility] note change vytorin to zocor 40  11/10 chol 141,trig 75,hdl 62,ldl 64 on vytorin 10/20 mg per [facility]  10/11 chol 159,trig 102,hdl 52,ldl 87 on vytorin 10/20 per [facility]  2/29/12 stop vytorin, change to zocor 20 mg per [facility]  6/12 chol 152,trig 102,hdl 49,ldl 83 on zocor 20 mg  6/13 chol 150,trig 130,hdl 52,ldl 72 on zocor 20 mg  1/29/14 chol 147,trig 127,hdl 50,ldl 72 on zocor 20 mg  9/3/14 chol 169,trig 228,hdl 49,ldl 74 on zocor 20 mg  6/30/15 chol  151,trig 88,hdl 63,ldl 70 on zocor 20 mg  8/31/16 chol 169,trig 130,hdl 59,ldl 84 on zocor 20 mg  7/11/17 chol 147,trig 146,hdl 51,ldl 67 on zocor 20 mg  11/8/18 chol 189,trig 215,hdl 54,ldl 92 on zocor 20 mg  12/6/19 chol 163,trig 214,hdl 54,ldl 66 on zocor 20 mg  8/24/21 chol 166,trig 142,hdl 57,ldl 81 on zocor 20 mg     Depression  3/12 tried cymalta for pain no benefit  12/6/13 PHQ 9 score 15, start cymbalta samples 30 mg x 7 days, then 60 mg  12/20/13 cymbalta 60 mg  1/22/15 off cymbalta    5/21/15 on cymbalta 60 mg  2/19/16 referral to behavioral health recommended he declines  11/29/16 depression screening score 0, continues on cymbalta  1/30/18 depression screening score 0 on cymbalta  8/2/21 on cymbalta screening 0 score      Chronic opiate  3/26/13 dr. murphy neurosurgery note, no further surgical intervention necessary, chronic narcotics norco 10 mg 5 per day; I will assume the norco rx from   12/6/13 change from hydrocodone 10 mg 4-5 tablets per day to oxycodone 10 mg 4-5 tabs per day  1/22/15 UDT millenium consistent  2/24/15 change from hydrocodone 10 mg to oxycodone 10 mg (percocet) qid #150 per 30 days  1/22/14 norco 10 mg #180, millenium URT done  2/24/15 change to percocet 10 mg #150 only one prescription provided, if successful for pain relief call us and I will write next 3 refills that he can  and then followup in office after that  5/21/15 percocet 10 mg #150 refill  5/21/15 narcotic contract  5/21/15 opiate risk score 1  8/24/15 refill percocet #150 x 3  8/24/15 referral pain management  10/4/15   2/19/16   2/19/16 increase frequency to percocet 10 mg q 4 hours #180 per month, declined trial of MS contin, because of increasing utilization, referral made to pain management   5/24/16   5/24/16 UDT [facility]  5/24/16 refill percocet #150 x 2 refer to  pain management to assume opiate prescription writing   6/22/16  pain management note,  initiate pregabalin, work on taper oxycodone  7/6/16  continue oxycodone and pregabalin  8/9/16  pain management note continue oxycodone, consider ultrasound-guided knee injections to right  9/12/16  pain management note, UDT consistent  7/9/19 on percocet 10 mg #140 per 28 days from  pain management also on cymbalta 60 mg and naprosyn 500 mg bid  7/9/19 referral [facility]   7/20/20 on percocet 10 mg 5 per day per  pain management, cymbalta 60 mg, naprosyn 500 mg bid      Cervical pain  3/06 MRI cervical spine moderate subdural frontal stenosis C5-C6 lesser extent C6-C7     Arthritis knee  9/3/14 x-ray right knee marked tricompartmental osteoarthritis, most severe medial compartment  2/19/16 has seen  orthopedics, declines knee replacement                           Patient Active Problem List   Diagnosis   • S/P hip replacement   • S/p lumbar surgery   • Cervical pain   • Hypertension   • Paroxysmal atrial fibrillation (HCC)   • Dyslipidemia   • History of squamous cell carcinoma   • Preventative health care   • S/P knee replacement   • Obesity   • S/p heel left surgery   • History of stroke   • Insomnia   • Depression, major, in partial remission (HCC)   • Chronic pain   • Arthritis of knee, right   • Opiate dependence, continuous (CMS-HCC)   • Speech and language deficit, late effect of cerebrovascular disease   • S/P Maze operation for atrial fibrillation   • Fibromyalgia   • Impaired glucose metabolism       Depression Screening    Little interest or pleasure in doing things?  0 - not at all  Feeling down, depressed , or hopeless? 0 - not at all  Patient Health Questionnaire Score: 0        Patient Care Team:  Norman Peterson M.D. as PCP - General  Norman Peterson M.D. as PCP - [facility] Paneled  Jose Martin Campbell M.D. as Consulting Physician (Anesthesiology)  Irineo Skinner M.D. as Consulting Physician (Internal Medicine Clinical Cardiac  Electrophysiology)  Pete Rangel O.D. as Consulting Physician (Optometry)  Reji Roberson as [facility]       ROS           Objective        Physical Exam  Vitals and nursing note reviewed.   Constitutional:       Appearance: Normal appearance.   HENT:      Head: Normocephalic and atraumatic.      Right Ear: External ear normal.      Left Ear: External ear normal.   Cardiovascular:      Rate and Rhythm: Normal rate and regular rhythm.      Heart sounds: Normal heart sounds.   Pulmonary:      Effort: Pulmonary effort is normal.   Abdominal:      General: There is no distension.   Musculoskeletal:      Cervical back: Neck supple.   Skin:     General: Skin is warm.   Neurological:      General: No focal deficit present.      Mental Status: He is alert.   Psychiatric:         Mood and Affect: Mood normal.         Behavior: Behavior normal.       Assessment & Plan        Assessment  #1  Chronic back pain status post lumbar surgery in 2012, has seen neurosurgery, pain management, remains on Percocet 10 mg 4/day quantity 120 for 30 days, pain is stable and controlled on that regimen    #2 chronic opiate therapy stable on Percocet 10 mg 4/day, medication does not cause excessive drowsiness, sedation, memory loss, depression, he has seen pain management, has had imaging performed, has tried different modalities of therapy, NSAIDs, Cymbalta, pregabalin, currently remains on naproxen, Cymbalta in addition to the Percocet    #3 hypertension on amlodipine and losartan, no regular exercise, tries to limit sweets and processed foods    #4  BMI 39.0    #5 dyslipidemia on Zocor    #6 chronic insomnia on Ambien 10 mg which provides some sleep, without the Ambien is not able to sleep much at all, with the medication at least he is able to sleep 2 hours at a time, Ambien does not cause daytime drowsiness or hangover effect, unable to taper Ambien but the current dose of medication provides adequate sleep  without side effects    #7 major depression in remission 0/3 score on cymbalta for pain, not mood    #8 paroxysmal atrial fibrillation stable per cardiology     Plan  #!  reviewed    #2 Controlled substance contract    [facility] to provide [facility] today    #4 check blood pressure regularly record, continue losartan and amlodipine, low-sodium diet    #5 work on regular activity and exercise    #6 follow-up cardiology as scheduled    #7 up-to-date on influenza and COVID series and booster    #8 refill Percocet 10 mg 4 tablets/day quantity 120 per 30 days, medication provides relief, has already seen pain management and has been able to taper down to 4 tablets a day which is his minimal dose, I believe the benefits outweigh the risk, long-term medication may cause habituation, dependence, memory loss, he is already on NSAIDs, Cymbalta, will continue on the medication no changes refill for the next 3 months to his pharmacy    #9 continue Ambien unable to taper long-term as discussed, can cause habituation, dependence, memory loss especially in conjunction with chronic opiate therapy, he has tried to taper in the past but would have breakthrough symptoms of the medication, the medication has been effective without side effects, he understands long-term risks and opts to continue on this medication, continue no alcohol with these medications    #10 follow-up 3 months, urine drug screen next visit

## 2022-01-05 ENCOUNTER — DOCUMENTATION (OUTPATIENT)
Dept: VASCULAR LAB | Facility: MEDICAL CENTER | Age: 77
End: 2022-01-05

## 2022-01-05 ENCOUNTER — TELEMEDICINE (OUTPATIENT)
Dept: CARDIOLOGY | Facility: MEDICAL CENTER | Age: 77
End: 2022-01-05
Payer: MEDICARE

## 2022-01-05 VITALS
DIASTOLIC BLOOD PRESSURE: 80 MMHG | SYSTOLIC BLOOD PRESSURE: 140 MMHG | WEIGHT: 272 LBS | OXYGEN SATURATION: 93 % | HEART RATE: 76 BPM | TEMPERATURE: 98.7 F | BODY MASS INDEX: 39.03 KG/M2

## 2022-01-05 DIAGNOSIS — Z86.73 HISTORY OF STROKE: Chronic | ICD-10-CM

## 2022-01-05 DIAGNOSIS — Z86.79 S/P MAZE OPERATION FOR ATRIAL FIBRILLATION: ICD-10-CM

## 2022-01-05 DIAGNOSIS — I10 ESSENTIAL HYPERTENSION: ICD-10-CM

## 2022-01-05 DIAGNOSIS — I10 PRIMARY HYPERTENSION: Chronic | ICD-10-CM

## 2022-01-05 DIAGNOSIS — E66.01 CLASS 2 SEVERE OBESITY DUE TO EXCESS CALORIES WITH SERIOUS COMORBIDITY IN ADULT, UNSPECIFIED BMI (HCC): ICD-10-CM

## 2022-01-05 DIAGNOSIS — I48.0 PAROXYSMAL ATRIAL FIBRILLATION (HCC): Chronic | ICD-10-CM

## 2022-01-05 DIAGNOSIS — Z98.890 S/P MAZE OPERATION FOR ATRIAL FIBRILLATION: ICD-10-CM

## 2022-01-05 DIAGNOSIS — I48.0 PAF (PAROXYSMAL ATRIAL FIBRILLATION) (HCC): ICD-10-CM

## 2022-01-05 PROCEDURE — 99214 OFFICE O/P EST MOD 30 MIN: CPT | Mod: 95 | Performed by: INTERNAL MEDICINE

## 2022-01-05 RX ORDER — TRIAMTERENE AND HYDROCHLOROTHIAZIDE 37.5; 25 MG/1; MG/1
2 TABLET ORAL DAILY
Qty: 180 TABLET | Refills: 3 | Status: SHIPPED | OUTPATIENT
Start: 2022-01-05 | End: 2022-04-21 | Stop reason: SDUPTHER

## 2022-01-05 ASSESSMENT — FIBROSIS 4 INDEX: FIB4 SCORE: 1.88

## 2022-01-05 NOTE — PROGRESS NOTES
Telemedicine Video Visit: Established Patient   This Remote Face to Face encounter was conducted via Zoom. Given the importance of social distancing and other strategies recommended to reduce the risk of COVID-19 transmission, I am providing medical care to this patient via audio/video visit in place of an in person visit at the request of the patient. Verbal consent to telehealth, risks, benefits, and consequences were discussed. Patient retains the right to withdraw at any time. All existing confidentiality protections apply. The patient has access to all transmitted medical information. No dissemination of any patient images or information to other entities without further written consent.  Subjective:     Chief Complaint   Patient presents with   • Atrial Fibrillation       Julio Gonzalez is a 76 y.o. male presenting for evaluation and management of:    Atrial fibrillation previous CVA status post maze.  No arrhythmias, blood pressures been somewhat elevated, mild swelling.  On 3 antihypertensives    ROS    Allergies   Allergen Reactions   • Dye Fdc Blue [Brilliant Blue Fcf]      Angiogram contrast sensitivity, became very irritable       Current medicines (including changes today)  Current Outpatient Medications   Medication Sig Dispense Refill   • triamterene-hctz (MAXZIDE-25/DYAZIDE) 37.5-25 MG Tab Take 2 Tablets by mouth every day. 180 Tablet 3   • naproxen (NAPROSYN) 500 MG Tab TAKE ONE TABLET BY MOUTH TWICE A DAY AS NEEDED FOR PAIN -GENERIC FOR NAPROSYN 180 Tablet 3   • DULoxetine (CYMBALTA) 60 MG Cap DR Particles delayed-release capsule TAKE ONE CAPSULE BY MOUTH DAILY 90 Capsule 3   • zolpidem (AMBIEN) 10 MG Tab Take 1 Tablet by mouth at bedtime as needed for Sleep for up to 90 days. 90 Tablet 1   • potassium chloride SA (KLOR-CON M20) 20 MEQ Tab CR Take 2 po qam, 1 po at noon, 2 po qpm 450 Tablet 2   • oxyCODONE-acetaminophen (PERCOCET-10)  MG Tab Take 1 Tablet by mouth every 6 hours as needed  for Moderate Pain for up to 30 days. Indications: low back pain 120 Tablet 0   • amLODIPine (NORVASC) 10 MG Tab TAKE ONE TABLET BY MOUTH DAILY 100 Tablet 3   • losartan (COZAAR) 100 MG Tab TAKE ONE TABLET BY MOUTH DAILY 100 Tablet 3   • loratadine (CLARITIN) 10 MG Tab      • simvastatin (ZOCOR) 20 MG Tab TAKE ONE TABLET BY MOUTH EVERY EVENING 100 tablet 3   • Multiple Vitamins-Minerals (MULTI COMPLETE PO) Take  by mouth.     • aspirin (ASA) 81 MG Chew Tab chewable tablet Take 1 Tab by mouth every day. 100 Tab 11   • polyethylene glycol/lytes (MIRALAX) PACK Take 17 g by mouth every day.     • docusate sodium (COLACE) 100 MG CAPS Take 100 mg by mouth every day.     • vitamin D (CHOLECALCIFEROL) 1000 UNIT TABS Take 6,000 Units by mouth every day.       No current facility-administered medications for this visit.       Patient Active Problem List    Diagnosis Date Noted   • Speech and language deficit, late effect of cerebrovascular disease 05/20/2015     Priority: Low   • Impaired glucose metabolism 11/08/2018   • Fibromyalgia 03/26/2016   • S/P Maze operation for atrial fibrillation 11/18/2015   • Opiate dependence, continuous (CMS-HCC) 05/20/2015   • Arthritis of knee, right 09/03/2014   • Depression, major, in partial remission ([facility]) 12/06/2013   • Chronic pain 12/06/2013   • Insomnia 02/08/2013   • History of stroke 08/05/2011   • S/p heel left surgery 11/02/2010   • Obesity 09/17/2009   • S/P hip replacement 09/16/2009   • S/p lumbar surgery 09/16/2009   • Cervical pain 09/16/2009   • Hypertension 09/16/2009   • Paroxysmal atrial fibrillation (HCC) 09/16/2009   • Dyslipidemia 09/16/2009   • History of squamous cell carcinoma 09/16/2009   • Preventative health care 09/16/2009   • S/P knee replacement 09/16/2009       Family History   Problem Relation Age of Onset   • Heart Disease Brother         atrial fib   • Cancer Sister         breast cancer   • Heart Disease Unknown    • Hypertension Unknown    • Stroke  Unknown        He  has a past medical history of Aphasia, Arrhythmia, Arthritis, CAD (coronary artery disease), Hypercholesteremia, Hypercholesterolemia (2/13/2012), Hypertension, MEDICAL HOME, Pain, Stroke (HCC) (1999), and Unspecified cerebral artery occlusion without mention of cerebral infarction (2/13/2012).  He  has a past surgical history that includes hip replacement, total (2002/2004); other cardiac surgery (2000); other abdominal surgery (1981); knee replacement, total (2001); vein ligation (1987); other (1994); other (1999); block epidural steroid injection (2001); colonoscopy (2001/2007); angiogram (1999); eswt (12/9/2009); pr inj dx/ther agnt paravert facet joint, jessica* (11/4/2011); pr inj dx/ther agnt paravert facet joint, ce* (11/4/2011); maze procedure; fusion, spine, lumbar, plif (9/26/2012); lumbar decompression (9/26/2012); maze procedure; and knee replacement, total (2002).       Objective:   Vitals obtained by patient:  /80 (BP Location: Left arm)   Pulse 76   Temp 37.1 °C (98.7 °F)   Wt 123 kg (272 lb)   SpO2 93%   BMI 39.03 kg/m²     Physical Exam:  Normal appearing      Assessment and Plan:   The following treatment plan was discussed:     1. Paroxysmal atrial fibrillation (HCC)    2. S/P Maze operation for atrial fibrillation    3. Primary hypertension    4. History of stroke    5. Class 2 severe obesity due to excess calories with serious comorbidity in adult, unspecified BMI ([facility])    6. PAF (paroxysmal atrial fibrillation) ([facility])    7. Essential hypertension  - Referral to Vascular Medicine    Other orders  - triamterene-hctz (MAXZIDE-25/DYAZIDE) 37.5-25 MG Tab; Take 2 Tablets by mouth every day.  Dispense: 180 Tablet; Refill: 3    1.  Hypertension double his diuretic.  2.  Referral to vascular medicine.  3.  Atrial fibrillation no recurrence.  4.  Previous CVA.  5.  Follow-up with me in 6 months.      Face to Face Video Visit:   I spent 15 minutes with patient/guardian and I  conducted this visit with audio and video present.  Irineo Skinner M.D.

## 2022-01-05 NOTE — PROGRESS NOTES
Called and spoke with Julio Wife, Juliana. She stated that Dr. Skinner started him on a new Rx and they wanted to see if that was going to work before scheduling with us. Pt to call the office when/if they decide they want to schedule an initial vascular appt with Dr. Núñez.

## 2022-01-13 DIAGNOSIS — F11.90 CHRONIC NARCOTIC USE: ICD-10-CM

## 2022-01-13 DIAGNOSIS — M54.9 BACK PAIN, UNSPECIFIED BACK LOCATION, UNSPECIFIED BACK PAIN LATERALITY, UNSPECIFIED CHRONICITY: ICD-10-CM

## 2022-01-13 DIAGNOSIS — G47.00 INSOMNIA: ICD-10-CM

## 2022-01-13 DIAGNOSIS — M25.559 HIP PAIN: ICD-10-CM

## 2022-01-13 DIAGNOSIS — M54.50 MIDLINE LOW BACK PAIN WITHOUT SCIATICA: ICD-10-CM

## 2022-01-13 RX ORDER — OXYCODONE AND ACETAMINOPHEN 10; 325 MG/1; MG/1
1 TABLET ORAL EVERY 6 HOURS PRN
Qty: 120 TABLET | Refills: 0 | Status: SHIPPED | OUTPATIENT
Start: 2022-03-14 | End: 2022-03-31 | Stop reason: SDUPTHER

## 2022-01-13 RX ORDER — OXYCODONE AND ACETAMINOPHEN 10; 325 MG/1; MG/1
1 TABLET ORAL EVERY 6 HOURS PRN
Qty: 120 TABLET | Refills: 0 | Status: SHIPPED | OUTPATIENT
Start: 2022-02-12 | End: 2022-03-14

## 2022-01-13 RX ORDER — OXYCODONE AND ACETAMINOPHEN 10; 325 MG/1; MG/1
1 TABLET ORAL EVERY 6 HOURS PRN
Qty: 120 TABLET | Refills: 0 | Status: SHIPPED | OUTPATIENT
Start: 2022-01-13 | End: 2022-02-12

## 2022-02-07 ENCOUNTER — PATIENT MESSAGE (OUTPATIENT)
Dept: HEALTH INFORMATION MANAGEMENT | Facility: OTHER | Age: 77
End: 2022-02-07
Payer: MEDICARE

## 2022-02-07 NOTE — TELEPHONE ENCOUNTER
ESTABLISHED PATIENT PRE-VISIT PLANNING     Note: Patient will not be contacted if there is no indication to call.     1.  Reviewed notes from the last few office visits within the medical group: Yes    2.  If any orders were placed at last visit or intended to be done for this visit (i.e. 6 mos follow-up), do we have Results/Consult Notes?        •  Labs - Labs ordered, completed on 11/12/2017 and results are in chart.   Note: If patient appointment is for lab review and patient did not complete labs, check with provider if OK to reschedule patient until labs completed.       •  Imaging - Imaging was not ordered at last office visit.       •  Referrals - No referrals were ordered at last office visit.    3. Is this appointment scheduled as a Hospital Follow-Up? No    4.  Immunizations were updated in Epic using WebIZ?: Epic matches WebIZ       •  Web Iz Recommendations: MMR , PNEUMOVAX (PPSV23), TDAP and Shingrix (Shingles)    5.  Patient is due for the following Health Maintenance Topics:   Health Maintenance Due   Topic Date Due   • IMM DTaP/Tdap/Td Vaccine (1 - Tdap) 08/06/2011   • IMM PNEUMOCOCCAL 65+ (ADULT) LOW/MEDIUM RISK SERIES (2 of 2 - PPSV23) 08/27/2016   • COLONOSCOPY  04/06/2017   • Annual Wellness Visit  11/30/2017         6.  MDX printed for Provider? NO    7.  Patient was NOT informed to arrive 15 min prior to their scheduled appointment and bring in their medication bottles.   Assessment: Appropriate reaction

## 2022-03-31 ENCOUNTER — OFFICE VISIT (OUTPATIENT)
Dept: MEDICAL GROUP | Facility: MEDICAL CENTER | Age: 77
End: 2022-03-31
Payer: MEDICARE

## 2022-03-31 VITALS
WEIGHT: 267 LBS | OXYGEN SATURATION: 91 % | DIASTOLIC BLOOD PRESSURE: 86 MMHG | SYSTOLIC BLOOD PRESSURE: 158 MMHG | HEART RATE: 108 BPM | BODY MASS INDEX: 37.38 KG/M2 | TEMPERATURE: 97.9 F | HEIGHT: 71 IN

## 2022-03-31 DIAGNOSIS — E55.9 VITAMIN D DEFICIENCY: ICD-10-CM

## 2022-03-31 DIAGNOSIS — G89.29 OTHER CHRONIC PAIN: ICD-10-CM

## 2022-03-31 DIAGNOSIS — E66.01 CLASS 2 SEVERE OBESITY DUE TO EXCESS CALORIES WITH SERIOUS COMORBIDITY IN ADULT, UNSPECIFIED BMI (HCC): ICD-10-CM

## 2022-03-31 DIAGNOSIS — M54.50 MIDLINE LOW BACK PAIN WITHOUT SCIATICA: ICD-10-CM

## 2022-03-31 DIAGNOSIS — E78.5 DYSLIPIDEMIA: ICD-10-CM

## 2022-03-31 DIAGNOSIS — F11.90 CHRONIC NARCOTIC USE: ICD-10-CM

## 2022-03-31 DIAGNOSIS — Z00.00 PREVENTATIVE HEALTH CARE: Chronic | ICD-10-CM

## 2022-03-31 DIAGNOSIS — M54.9 BACK PAIN, UNSPECIFIED BACK LOCATION, UNSPECIFIED BACK PAIN LATERALITY, UNSPECIFIED CHRONICITY: ICD-10-CM

## 2022-03-31 DIAGNOSIS — M25.559 HIP PAIN: ICD-10-CM

## 2022-03-31 PROCEDURE — 99214 OFFICE O/P EST MOD 30 MIN: CPT | Performed by: INTERNAL MEDICINE

## 2022-03-31 RX ORDER — ZOLPIDEM TARTRATE 10 MG/1
TABLET ORAL
COMMUNITY
Start: 2010-01-10 | End: 2022-05-17

## 2022-03-31 RX ORDER — OXYCODONE AND ACETAMINOPHEN 10; 325 MG/1; MG/1
1 TABLET ORAL EVERY 6 HOURS PRN
Qty: 120 TABLET | Refills: 0 | Status: SHIPPED | OUTPATIENT
Start: 2022-06-15 | End: 2022-06-13 | Stop reason: SDUPTHER

## 2022-03-31 RX ORDER — OXYCODONE AND ACETAMINOPHEN 10; 325 MG/1; MG/1
1 TABLET ORAL EVERY 6 HOURS PRN
Qty: 120 TABLET | Refills: 0 | Status: SHIPPED | OUTPATIENT
Start: 2022-05-14 | End: 2022-06-13

## 2022-03-31 RX ORDER — OXYCODONE AND ACETAMINOPHEN 10; 325 MG/1; MG/1
1 TABLET ORAL EVERY 6 HOURS PRN
Qty: 120 TABLET | Refills: 0 | Status: SHIPPED | OUTPATIENT
Start: 2022-04-14 | End: 2022-05-14

## 2022-03-31 ASSESSMENT — FIBROSIS 4 INDEX: FIB4 SCORE: 1.88

## 2022-03-31 NOTE — PROGRESS NOTES
Subjective     Julio Gonzalez is a 76 y.o. male who presents with medication refill  Follow-Up            HPI     Patient is here with his wife, here for medication refill, chronic low back pain status post lumbar surgery 2012 L5-S1 decompression, foraminotomy, interbody fusion, has seen physical therapy, neurosurgery, most recently Dr. Campbell from pain management until last year.  I have taken over his refill of the Percocet medication, currently taking Percocet 10 mg 4 times daily quantity 120 for 30 days, medication has been effective controlling his pain without side effects of drowsiness, sedation, memory loss, depression, constipation.  No alcohol with the medication.  Has tried hydrocodone, NSAIDs, pregabalin, steroid injections, physical therapy, Cymbalta.  Blood pressure at home 120-130/70-80, does have a whitecoat component, remains on Maxide, amlodipine, losartan.  No regular exercise but he has been working on improving his nutrition program, low-salt diet, has lost 5 pounds on our scale since his last visit.  Remains on simvastatin for cholesterol control.        Current Outpatient Medications   Medication Sig Dispense Refill   • zolpidem (AMBIEN) 10 MG Tab      • oxyCODONE-acetaminophen (PERCOCET-10)  MG Tab Take 1 Tablet by mouth every 6 hours as needed for Severe Pain for up to 30 days. 120 Tablet 0   • triamterene-hctz (MAXZIDE-25/DYAZIDE) 37.5-25 MG Tab Take 2 Tablets by mouth every day. 180 Tablet 3   • naproxen (NAPROSYN) 500 MG Tab TAKE ONE TABLET BY MOUTH TWICE A DAY AS NEEDED FOR PAIN -GENERIC FOR NAPROSYN 180 Tablet 3   • DULoxetine (CYMBALTA) 60 MG Cap DR Particles delayed-release capsule TAKE ONE CAPSULE BY MOUTH DAILY 90 Capsule 3   • potassium chloride SA (KLOR-CON M20) 20 MEQ Tab CR Take 2 po qam, 1 po at noon, 2 po qpm 450 Tablet 2   • amLODIPine (NORVASC) 10 MG Tab TAKE ONE TABLET BY MOUTH DAILY 100 Tablet 3   • losartan (COZAAR) 100 MG Tab TAKE ONE TABLET BY MOUTH DAILY 100  Tablet 3   • loratadine (CLARITIN) 10 MG Tab      • simvastatin (ZOCOR) 20 MG Tab TAKE ONE TABLET BY MOUTH EVERY EVENING 100 tablet 3   • Multiple Vitamins-Minerals (MULTI COMPLETE PO) Take  by mouth.     • aspirin (ASA) 81 MG Chew Tab chewable tablet Take 1 Tab by mouth every day. 100 Tab 11   • polyethylene glycol/lytes (MIRALAX) PACK Take 17 g by mouth every day.     • docusate sodium (COLACE) 100 MG CAPS Take 100 mg by mouth every day.     • vitamin D (CHOLECALCIFEROL) 1000 UNIT TABS Take 6,000 Units by mouth every day.       No current facility-administered medications for this visit.       Status post lumbar surgery  2/01 MRI lumbar spine DJD L5-S1 anterolisthesis 4-5 mm marked stenosis saw  neurosurgery  9/09  neurosurgery note  12/09 neurosurgery note, severe left-sided foraminal stenosis, L5-S1 spondylolisthesis 4-5 mm recommend surgery, patient declined, has had epidural with no benefit,tried lyrica and neurontin before, declines cymbalta trial  4/10 note dr. johnson neurosurgery who gives patient norco, he is retiring, and has offered patient surgical therapy versus continuing norco 10 mg which we'll assume dispensing.  10/11 MRI lumbar spine grade 1 spondylolisthesis 9mm, bilateral spondylosis L5, moderate bilateral L5 stenosis, 2.4 cm left kidney cyst  10/11 xray LS, grade 1 spondylolisthesis L5 on S1 increased from prior exam, 1.3 cm, no significant change in flexion  11/11  pain note right L5-S1 transforaminal epidural  1/16/12 start cymbalta trial (3/12 stop cymbalta no benefit)  1/12 dr. murphy neurosurgery note surgical intervention offered L4 to sacrum decompression  3/26/12 restarted cymbalta    9/26/12  neurosurgery operative note decompression at L5-S1and bilateral foraminotomies,transforaminal lumbar interbody fusion, L4-L5 and L5-S1, with expandable cage 8-12 mm.  2/20/13 dr. murphy neurosurgery note, lumbar films, EMG NCS lower ext inconclusive, repeat  MRI lumbar spine pending  3/13/13 MRI lumbar spine postoperative changes L4-S1 lumbar laminectomy, interbody fusion, disc space insert L4-L5 L5-S1, posterior spinal fusion and fixation, L5-S1 moderate right foraminal stenosis. 2.4 cm mid right renal cyst unchanged  3/26/13 dr. murphy neurosurgery note, no further surgical intervention necessary, chronic narcotics norco 10 mg 5 per day; I will assume the norco rx from   1/25/15 off naprosyn and cymbalta; on norco 10 mg one every four hours #150  5/21/15 on percocet 10 mg five per day and on cymbalta 60 mg  3/25/16 start lyrica 50 mg tid for fibromyalgia, continue percocet 10 mg 5 per day and cymbalta 60 mg  4/12/16 increase lyrica to 100 mg tid (50 mg two tid), continue cymbalta 60 mg and percocet 10 mg 5 per day  9/26/16 off lyrica, on percocet 10 mg qid per  pain management and cymbalta  7/9/19 on percocet 10 mg #140 per 28 days from  pain management also on cymbalta 60 mg and naprosyn 500 mg bid  10/7/20  pain note on oxycodone #140 per 28 days will decrease to #112 per 28 days  11/4/20  pain note on oxycodone #112 per 28 days   10/7/21 I am taking over percocet refill, 4 per day #120 per 30 days  10/7/21   12/13/21 percocet 10 mg #120     Speech language deficit  1999 affected right arm, no old records  Some diff with short recall, and occasional diff with expressing self when fatigued     Status post knee replacement left 2001     Status post hip replacement bilateral  12/03 right total hip replacement  1/04 left total hip replacement      Preventative health  4/6/07 colonoscopy [facility] negative  10/26/10 zoster vaccine  8/27/15 prevnar at [facility]  12/12/19 tdap  12/12/19 pneumovax  2/27/21 covid pfizer second  8/2/21 declines colon  8/13/21 FIT negative  8/24/21 psa 2.2  8/24/21 vit d 72  8/24/21 hep c ab negative  10/7/21 shingrix second  10/14/21 covid pfizer booster     Paroxysmal atrial fibrillation  Sees  [facility]  2005 s/p maze procedure with a stroke related to that as a complication, have no records at all regarding that, no CT scan or MRI scan and probably had a cardiac catheterization by renal physicians in 2005 that was negative for coronary artery disease  8/11 [facility] note EKG NSR  5/12 [facility] note, on asa daily  11/12 [facility] note  4/29/13 cardiology note  11/3/13 cardiology note on asa  9/22/14 cardiology note sinus, follow up 6 months  5/12/15  cardiology note, paroxysmal atrial fibrillation status post maze operation, sinus rhythm  11/18/15 cardiology note stable follow one year  4/4/17  cardiology note, paroxysmal atrial fibrillation no recurrence, follow-up one year  2/5/19 cardiology note paroxysmal atrial fibrillation, sinus on exam, no recurrence of atrial fibrillation status post maze, follow-up 1 year  6/16/20 cardiology note atrial fibrillation status post maze no recurrence, hypertension stable on current regimen, work on weight loss, follow-up 6 months  7/13/21  cardiology note  1/4/22 declines overnight pulse oximetry     neck pain  3/06 MRI cervical spine moderate subdural frontal stenosis C5-C6 lesser extent C6-C7    knee arthritis   9/3/14 x-ray right knee marked tricompartmental osteoarthritis, most severe medial compartment  2/19/16 has seen  orthopedics, declines knee replacement       Insomnia  2009 on ambien 10 mg  2/19/16 unable to taper ambien, referral to behavioral sleep psychology recommended he declines  9/26/16 on ambien 10 mg work on taper, declines referral to sleep psychology  7/9/18 on ambien declines sleep management referral     Impaired glucose metabolism  11/8/18 A1c 6%  12/6/19 A1c 5.8%  8/24/21 A1c 5.5%      Hypertension  7/10 [facility] note change from hyzaar to enalapril hct 10/25  1/11 [facility] note bp not controlled on enalapril hct 10/25, change back to losartan hct 100/25 and norvasc 10 mg  6/12 K+ 3.0, increase kdur to 20 tid, consider decreasing hctz if  possible to decrease K+ supplementation  12/12 cozaar 100 mg, dyazide 37.5/25 mg, kcl 20 meq, norvasc 10 mg  1/29/14 on cozaar 100 mg, dyazide 37.5/25 mg, norvasc 10 mg, klor 20 meq bid; will increase to klor 20 meq tid  9/3//14 K+ 3.1 on klor 20 meq tid, increase to 20 meq two tab bid  9/3/14 urine mac <0.5 on cozaar 100 mg, dyazide 37.5/25 mg, norvasc 10 mg, klor 20 meq two tab bid  6/30/15 Na 132,K+ 3.5 on cozaar 100 mg, dyazide 37.5/25 mg, norvasc 10 mg, klor 20 meq two tab bid  8/31/16 K+ 3.3 on cozaar 100 mg, dyazide 37.5/25 mg, norvasc 10 mg, klor 20 meq two tab qday will increase to 2 bid  7/11/17 K+3.3 on cozaar 100 mg, dyazide 37.5/25 mg, norvasc 10 mg, klor 20 meq two bid  2/5/19 cardiology note paroxysmal atrial fibrillation, sinus on exam, no recurrence of atrial fibrillation status post maze, follow-up 1 year  12/6/19 K+3.5 on cozaar 100 mg, dyazide 37.5/25 mg, norvasc 10 mg, klor 20 meq two bid  6/16/20 cardiology note atrial fibrillation status post maze no recurrence, hypertension stable on current regimen, work on weight loss, follow-up 6 months  7/13/21  cardiology note on cozaar 100 mg, dyazide 37.5/25 mg, norvasc 10 mg, klor 20 meq two bid  8/24/21 K+ 3.4 on klor 20 meq two bid recommend increase to 2 am, 1 noon, 2 pm     History stroke  1999 affected right arm, no old records  Some difficulty with short recall, and occasional diff with expressing self when fatigued     History squamous cell carcinoma  Saw  dermatology offered aldara he declined  3/27/17  dermatology note  5/1/17  dermatology biopsy proven squamous cell carcinoma in situ left forehead, here for mohs  9/19/17  dermatology note continue      Fibromyalgia  3/25/16 start lyrica 50 mg tid for fibromyalgia, continue percocet 10 mg 5 per day and cymbalta 60 mg  4/12/16 increase lyrica to 100 mg tid (50 mg two tid), continue cymbalta 60 mg and percocet 10 mg 5 per day  4/28/16  increase lyrica to 150 mg tid, continue cymbalta 60 mg and percocet 10 mg five times per day  5/10/16 on lyrica 50 mg three tabs tid, change to 100 mg am, 150 mg noon, 100 mg pm     Dyslipidemia  Followed by cardiology  2/09 cholesterol 135, triglycerides 100, HDL 47, LDL 68  8/09 chol 138,trig 101,hdl 49,ldl 69  3/10 chol 123,trig 66,hdl 49,ldl 61  7/10 [facility] note change vytorin to zocor 40  11/10 chol 141,trig 75,hdl 62,ldl 64 on vytorin 10/20 mg per [facility]  10/11 chol 159,trig 102,hdl 52,ldl 87 on vytorin 10/20 per [facility]  2/29/12 stop vytorin, change to zocor 20 mg per [facility]  6/12 chol 152,trig 102,hdl 49,ldl 83 on zocor 20 mg  6/13 chol 150,trig 130,hdl 52,ldl 72 on zocor 20 mg  1/29/14 chol 147,trig 127,hdl 50,ldl 72 on zocor 20 mg  9/3/14 chol 169,trig 228,hdl 49,ldl 74 on zocor 20 mg  6/30/15 chol 151,trig 88,hdl 63,ldl 70 on zocor 20 mg  8/31/16 chol 169,trig 130,hdl 59,ldl 84 on zocor 20 mg  7/11/17 chol 147,trig 146,hdl 51,ldl 67 on zocor 20 mg  11/8/18 chol 189,trig 215,hdl 54,ldl 92 on zocor 20 mg  12/6/19 chol 163,trig 214,hdl 54,ldl 66 on zocor 20 mg  8/24/21 chol 166,trig 142,hdl 57,ldl 81 on zocor 20 mg     Depression  3/12 tried cymalta for pain no benefit  12/6/13 PHQ 9 score 15, start cymbalta samples 30 mg x 7 days, then 60 mg  12/20/13 cymbalta 60 mg  1/22/15 off cymbalta    5/21/15 on cymbalta 60 mg  2/19/16 referral to behavioral health recommended he declines  11/29/16 depression screening score 0, continues on cymbalta  1/30/18 depression screening score 0 on cymbalta  8/2/21 on cymbalta screening 0 score   1/4/22 on cymbalta screening 0/3 score     Chronic opiate  3/26/13 dr. murphy neurosurgery note, no further surgical intervention necessary, chronic narcotics norco 10 mg 5 per day; I will assume the norco rx from   12/6/13 change from hydrocodone 10 mg 4-5 tablets per day to oxycodone 10 mg 4-5 tabs per day  1/22/15 UDT millenium consistent  2/24/15 change from hydrocodone 10 mg to  oxycodone 10 mg (percocet) qid #150 per 30 days  1/22/14 norco 10 mg #180, millenium URT done  2/24/15 change to percocet 10 mg #150 only one prescription provided, if successful for pain relief call us and I will write next 3 refills that he can  and then followup in office after that  5/21/15 percocet 10 mg #150 refill  5/21/15 narcotic contract  5/21/15 opiate risk score 1  8/24/15 refill percocet #150 x 3  8/24/15 referral pain management  10/4/15   2/19/16   2/19/16 increase frequency to percocet 10 mg q 4 hours #180 per month, declined trial of MS contin, because of increasing utilization, referral made to pain management   5/24/16   5/24/16 UDT millennium  5/24/16 refill percocet #150 x 2 refer to  pain management to assume opiate prescription writing   6/22/16  pain management note, initiate pregabalin, work on taper oxycodone  7/6/16  continue oxycodone and pregabalin  8/9/16  pain management note continue oxycodone, consider ultrasound-guided knee injections to right  9/12/16  pain management note, UDT consistent  7/9/19 on percocet 10 mg #140 per 28 days from  pain management also on cymbalta 60 mg and naprosyn 500 mg bid  7/9/19 referral [facility]   7/20/20 on percocet 10 mg 5 per day per  pain management, cymbalta 60 mg, naprosyn 500 mg bid                         Patient Active Problem List   Diagnosis   • S/P hip replacement   • S/p lumbar surgery   • Neck pain   • Hypertension   • Paroxysmal atrial fibrillation (HCC)   • Dyslipidemia   • History of squamous cell carcinoma   • Preventative health care   • S/P knee replacement   • Obesity   • S/p heel left surgery   • History of stroke   • Insomnia   • Depression, major, in partial remission (HCC)   • Chronic pain   • Knee arthritis   • Opiate dependence, continuous (CMS-HCC)   • Fibromyalgia   • Impaired glucose metabolism          Patient Care Team:  Norman COATS  "DOUGLAS Peterson as PCP - General  Norman JORGE L Peterson M.D. as PCP - [facility] Paneled  Jose Martin Campbell M.D. as Consulting Physician (Anesthesiology)  Irineo Skinner M.D. as Consulting Physician (Internal Medicine Clinical Cardiac Electrophysiology)  Pete aRngel O.D. as Consulting Physician (Optometry)  Reji KINCAID Da as [facility]       ROS           Objective     /86 (BP Location: Right arm, Patient Position: Sitting, BP Cuff Size: Large adult)   Pulse (!) 108   Temp 36.6 °C (97.9 °F)   Ht 1.803 m (5' 11\")   Wt 121 kg (267 lb)   SpO2 91%   BMI 37.24 kg/m²      Physical Exam  Vitals and nursing note reviewed.   Constitutional:       Appearance: Normal appearance.   HENT:      Head: Normocephalic and atraumatic.      Right Ear: External ear normal.      Left Ear: External ear normal.   Cardiovascular:      Rate and Rhythm: Normal rate and regular rhythm.      Heart sounds: Normal heart sounds.   Pulmonary:      Effort: Pulmonary effort is normal.      Breath sounds: Normal breath sounds.   Abdominal:      General: There is no distension.   Skin:     General: Skin is warm.      Findings: No bruising.   Neurological:      General: No focal deficit present.      Mental Status: He is alert.   Psychiatric:         Mood and Affect: Mood normal.         Behavior: Behavior normal.            Assessment & Plan        Assessment  #1  Chronic low back pain status post lumbar surgery, has seen pain management, stable on Percocet 10 mg 4 times daily quantity 120 for 30 days, previously has tried NSAIDs, Cymbalta, pregabalin, hydrocodone, Percocet is effective without side effects.    #2 chronic Percocet therapy 10 mg 4 times daily, no drowsiness, sedation, memory loss, depression, constipation, no alcohol medication    #3  Hypertension whitecoat component, elevated today but blood pressure at home running 120-130/over 70s, stable on Maxide, amlodipine, losartan    #4 dyslipidemia on Zocor 20 " mg    #5 history atrial fibrillation status post Maze procedure no recurrence, has seen cardiology, no palpitations, no caffeine, sinus on exam     #6 BMI 37.2 he has done an excellent job decreasing his salt intake, working on his nutrition, and has lost weight, still not able to exercise because of chronic back pain, however his weight has decreased    #7 vitamin D deficiency    Plan  #1 continue Percocet 10 mg 4 times daily, medications beneficial for chronic back pain without side effects, has tried other medications such as NSAIDs, pregabalin, hydrocodone, seen pain management for injections, the Percocet is effective at this dose and frequency without side effects allowing him to perform his activities of daily living, understanding the risks and long-term benefits of the medication, I believe the medication benefits outweighs the risks, prescription to his pharmacy for the next 3 months    #2     #3 urine drug test today    #4 continue check blood pressure regular, elevated blood pressure today elevated with whitecoat component however home blood pressure readings are stable and in range at goal    #5 repeat labs before next visit    #6 follow-up 3 months

## 2022-04-20 ENCOUNTER — TELEPHONE (OUTPATIENT)
Dept: CARDIOLOGY | Facility: MEDICAL CENTER | Age: 77
End: 2022-04-20
Payer: MEDICARE

## 2022-04-20 NOTE — TELEPHONE ENCOUNTER
CHAUNCEY    Juliana (spouse) would like refill for:   triamterene-hctz (MAXZIDE-25/DYAZIDE) 37.5-25 MG Tab (Order #[id number]) on 1/5/22, to go to:    [facility]  [address]   [place], [place]   [phone]      Juliana - [phone]    Thank you,    Monika GARCÍA

## 2022-04-21 RX ORDER — TRIAMTERENE AND HYDROCHLOROTHIAZIDE 37.5; 25 MG/1; MG/1
2 TABLET ORAL DAILY
Qty: 180 TABLET | Refills: 3 | Status: SHIPPED | OUTPATIENT
Start: 2022-04-21 | End: 2022-04-28 | Stop reason: SDUPTHER

## 2022-04-21 NOTE — TELEPHONE ENCOUNTER
Received request via: Patient    Was the patient seen in the last year in this department? Yes    Does the patient have an active prescription (recently filled or refills available) for medication(s) requested? Yes. Request to route RX to new pharm.

## 2022-04-28 RX ORDER — TRIAMTERENE AND HYDROCHLOROTHIAZIDE 37.5; 25 MG/1; MG/1
2 TABLET ORAL DAILY
Qty: 180 TABLET | Refills: 3 | Status: SHIPPED | OUTPATIENT
Start: 2022-04-28 | End: 2023-08-11

## 2022-06-13 ENCOUNTER — TELEPHONE (OUTPATIENT)
Dept: MEDICAL GROUP | Facility: MEDICAL CENTER | Age: 77
End: 2022-06-13
Payer: MEDICARE

## 2022-06-13 DIAGNOSIS — M54.9 BACK PAIN, UNSPECIFIED BACK LOCATION, UNSPECIFIED BACK PAIN LATERALITY, UNSPECIFIED CHRONICITY: ICD-10-CM

## 2022-06-13 DIAGNOSIS — G47.00 INSOMNIA, UNSPECIFIED TYPE: ICD-10-CM

## 2022-06-13 RX ORDER — OXYCODONE AND ACETAMINOPHEN 10; 325 MG/1; MG/1
1 TABLET ORAL EVERY 6 HOURS PRN
Qty: 120 TABLET | Refills: 0 | Status: SHIPPED | OUTPATIENT
Start: 2022-06-13 | End: 2022-06-27 | Stop reason: SDUPTHER

## 2022-06-13 NOTE — TELEPHONE ENCOUNTER
The patient last picked up his Percocet refill on 5/14/2022, on the prescription ordered 30 days of medication, so his refill date is today 6/13/2022.     I will send another prescription to his pharmacy to clear his refill for today.    Please notify the patient's wife.

## 2022-06-13 NOTE — TELEPHONE ENCOUNTER
Wife called to say that pt will be ouit of medication for 2 days. Pharmacy will not fill current RF until 6/15.Need new Rx per pharmacist.     Received request via: Patient    Was the patient seen in the last year in this department? Yes    Does the patient have an active prescription (recently filled or refills available) for medication(s) requested? No

## 2022-06-14 DIAGNOSIS — E78.5 DYSLIPIDEMIA: ICD-10-CM

## 2022-06-16 RX ORDER — SIMVASTATIN 20 MG
TABLET ORAL
Qty: 100 TABLET | Refills: 2 | Status: SHIPPED | OUTPATIENT
Start: 2022-06-16 | End: 2023-04-05

## 2022-06-20 ENCOUNTER — TELEPHONE (OUTPATIENT)
Dept: HEALTH INFORMATION MANAGEMENT | Facility: OTHER | Age: 77
End: 2022-06-20
Payer: MEDICARE

## 2022-06-22 ENCOUNTER — HOSPITAL ENCOUNTER (OUTPATIENT)
Dept: LAB | Facility: MEDICAL CENTER | Age: 77
End: 2022-06-22
Attending: INTERNAL MEDICINE
Payer: MEDICARE

## 2022-06-22 ENCOUNTER — TELEPHONE (OUTPATIENT)
Dept: MEDICAL GROUP | Facility: MEDICAL CENTER | Age: 77
End: 2022-06-22

## 2022-06-22 DIAGNOSIS — E78.5 DYSLIPIDEMIA: ICD-10-CM

## 2022-06-22 DIAGNOSIS — E55.9 VITAMIN D DEFICIENCY: ICD-10-CM

## 2022-06-22 LAB
25(OH)D3 SERPL-MCNC: 85 NG/ML (ref 30–100)
ALBUMIN SERPL BCP-MCNC: 4.3 G/DL (ref 3.2–4.9)
ALBUMIN/GLOB SERPL: 1.4 G/DL
ALP SERPL-CCNC: 75 U/L (ref 30–99)
ALT SERPL-CCNC: 10 U/L (ref 2–50)
ANION GAP SERPL CALC-SCNC: 12 MMOL/L (ref 7–16)
AST SERPL-CCNC: 18 U/L (ref 12–45)
BASOPHILS # BLD AUTO: 0.6 % (ref 0–1.8)
BASOPHILS # BLD: 0.06 K/UL (ref 0–0.12)
BILIRUB SERPL-MCNC: 0.5 MG/DL (ref 0.1–1.5)
BUN SERPL-MCNC: 21 MG/DL (ref 8–22)
CALCIUM SERPL-MCNC: 9.5 MG/DL (ref 8.4–10.2)
CHLORIDE SERPL-SCNC: 98 MMOL/L (ref 96–112)
CHOLEST SERPL-MCNC: 149 MG/DL (ref 100–199)
CO2 SERPL-SCNC: 23 MMOL/L (ref 20–33)
CREAT SERPL-MCNC: 0.97 MG/DL (ref 0.5–1.4)
EOSINOPHIL # BLD AUTO: 0.15 K/UL (ref 0–0.51)
EOSINOPHIL NFR BLD: 1.6 % (ref 0–6.9)
ERYTHROCYTE [DISTWIDTH] IN BLOOD BY AUTOMATED COUNT: 40.5 FL (ref 35.9–50)
FASTING STATUS PATIENT QL REPORTED: NORMAL
GFR SERPLBLD CREATININE-BSD FMLA CKD-EPI: 81 ML/MIN/1.73 M 2
GLOBULIN SER CALC-MCNC: 3.1 G/DL (ref 1.9–3.5)
GLUCOSE SERPL-MCNC: 106 MG/DL (ref 65–99)
HCT VFR BLD AUTO: 42.1 % (ref 42–52)
HDLC SERPL-MCNC: 48 MG/DL
HGB BLD-MCNC: 13.8 G/DL (ref 14–18)
IMM GRANULOCYTES # BLD AUTO: 0.03 K/UL (ref 0–0.11)
IMM GRANULOCYTES NFR BLD AUTO: 0.3 % (ref 0–0.9)
LDLC SERPL CALC-MCNC: 73 MG/DL
LYMPHOCYTES # BLD AUTO: 2.95 K/UL (ref 1–4.8)
LYMPHOCYTES NFR BLD: 31.7 % (ref 22–41)
MCH RBC QN AUTO: 29.7 PG (ref 27–33)
MCHC RBC AUTO-ENTMCNC: 32.8 G/DL (ref 33.7–35.3)
MCV RBC AUTO: 90.7 FL (ref 81.4–97.8)
MONOCYTES # BLD AUTO: 0.72 K/UL (ref 0–0.85)
MONOCYTES NFR BLD AUTO: 7.7 % (ref 0–13.4)
NEUTROPHILS # BLD AUTO: 5.41 K/UL (ref 1.82–7.42)
NEUTROPHILS NFR BLD: 58.1 % (ref 44–72)
NRBC # BLD AUTO: 0 K/UL
NRBC BLD-RTO: 0 /100 WBC
PLATELET # BLD AUTO: 258 K/UL (ref 164–446)
PMV BLD AUTO: 8.8 FL (ref 9–12.9)
POTASSIUM SERPL-SCNC: 3.4 MMOL/L (ref 3.6–5.5)
PROT SERPL-MCNC: 7.4 G/DL (ref 6–8.2)
RBC # BLD AUTO: 4.64 M/UL (ref 4.7–6.1)
SODIUM SERPL-SCNC: 133 MMOL/L (ref 135–145)
TRIGL SERPL-MCNC: 139 MG/DL (ref 0–149)
TSH SERPL DL<=0.005 MIU/L-ACNC: 3.12 UIU/ML (ref 0.38–5.33)
WBC # BLD AUTO: 9.3 K/UL (ref 4.8–10.8)

## 2022-06-22 PROCEDURE — 85025 COMPLETE CBC W/AUTO DIFF WBC: CPT

## 2022-06-22 PROCEDURE — 82306 VITAMIN D 25 HYDROXY: CPT

## 2022-06-22 PROCEDURE — 80061 LIPID PANEL: CPT

## 2022-06-22 PROCEDURE — 36415 COLL VENOUS BLD VENIPUNCTURE: CPT

## 2022-06-22 PROCEDURE — 84443 ASSAY THYROID STIM HORMONE: CPT

## 2022-06-22 PROCEDURE — 80053 COMPREHEN METABOLIC PANEL: CPT

## 2022-06-22 NOTE — TELEPHONE ENCOUNTER
Notified wife with results, potassium 3.4 and has been stable taking potassium supplementation 2 pills a.m., 1 pill at noon, 2 pills p.m., he could take an extra pill at noon otherwise continue current regimen no changes statin therapy.

## 2022-06-27 ENCOUNTER — OFFICE VISIT (OUTPATIENT)
Dept: MEDICAL GROUP | Facility: MEDICAL CENTER | Age: 77
End: 2022-06-27
Payer: MEDICARE

## 2022-06-27 VITALS
OXYGEN SATURATION: 92 % | TEMPERATURE: 98.1 F | DIASTOLIC BLOOD PRESSURE: 86 MMHG | HEIGHT: 67 IN | HEART RATE: 125 BPM | SYSTOLIC BLOOD PRESSURE: 142 MMHG | WEIGHT: 268 LBS | BODY MASS INDEX: 42.06 KG/M2

## 2022-06-27 DIAGNOSIS — I10 PRIMARY HYPERTENSION: ICD-10-CM

## 2022-06-27 DIAGNOSIS — F33.41 RECURRENT MAJOR DEPRESSIVE DISORDER, IN PARTIAL REMISSION (HCC): ICD-10-CM

## 2022-06-27 DIAGNOSIS — M54.9 BACK PAIN, UNSPECIFIED BACK LOCATION, UNSPECIFIED BACK PAIN LATERALITY, UNSPECIFIED CHRONICITY: ICD-10-CM

## 2022-06-27 DIAGNOSIS — G89.29 OTHER CHRONIC PAIN: ICD-10-CM

## 2022-06-27 DIAGNOSIS — E66.01 CLASS 2 SEVERE OBESITY DUE TO EXCESS CALORIES WITH SERIOUS COMORBIDITY IN ADULT, UNSPECIFIED BMI (HCC): ICD-10-CM

## 2022-06-27 DIAGNOSIS — F11.20 OPIATE DEPENDENCE, CONTINUOUS (HCC): ICD-10-CM

## 2022-06-27 DIAGNOSIS — I48.0 PAROXYSMAL ATRIAL FIBRILLATION (HCC): ICD-10-CM

## 2022-06-27 PROCEDURE — 99214 OFFICE O/P EST MOD 30 MIN: CPT | Performed by: INTERNAL MEDICINE

## 2022-06-27 RX ORDER — OXYCODONE AND ACETAMINOPHEN 10; 325 MG/1; MG/1
1 TABLET ORAL EVERY 6 HOURS PRN
Qty: 120 TABLET | Refills: 0 | Status: SHIPPED | OUTPATIENT
Start: 2022-09-11 | End: 2022-08-12 | Stop reason: SDUPTHER

## 2022-06-27 RX ORDER — OXYCODONE AND ACETAMINOPHEN 10; 325 MG/1; MG/1
1 TABLET ORAL EVERY 6 HOURS PRN
Qty: 120 TABLET | Refills: 0 | Status: SHIPPED | OUTPATIENT
Start: 2022-07-13 | End: 2022-08-12

## 2022-06-27 RX ORDER — OXYCODONE AND ACETAMINOPHEN 10; 325 MG/1; MG/1
1 TABLET ORAL EVERY 6 HOURS PRN
Qty: 120 TABLET | Refills: 0 | Status: SHIPPED | OUTPATIENT
Start: 2022-08-12 | End: 2022-09-11

## 2022-06-27 ASSESSMENT — FIBROSIS 4 INDEX: FIB4 SCORE: 1.65

## 2022-07-06 ENCOUNTER — TELEMEDICINE (OUTPATIENT)
Dept: CARDIOLOGY | Facility: MEDICAL CENTER | Age: 77
End: 2022-07-06
Payer: MEDICARE

## 2022-07-06 VITALS
DIASTOLIC BLOOD PRESSURE: 79 MMHG | HEIGHT: 67 IN | SYSTOLIC BLOOD PRESSURE: 130 MMHG | BODY MASS INDEX: 42.06 KG/M2 | WEIGHT: 268 LBS | HEART RATE: 78 BPM | OXYGEN SATURATION: 93 %

## 2022-07-06 DIAGNOSIS — Z86.79 S/P MAZE OPERATION FOR ATRIAL FIBRILLATION: ICD-10-CM

## 2022-07-06 DIAGNOSIS — I48.0 PAROXYSMAL ATRIAL FIBRILLATION (HCC): Chronic | ICD-10-CM

## 2022-07-06 DIAGNOSIS — Z98.890 S/P MAZE OPERATION FOR ATRIAL FIBRILLATION: ICD-10-CM

## 2022-07-06 DIAGNOSIS — I10 PRIMARY HYPERTENSION: Chronic | ICD-10-CM

## 2022-07-06 DIAGNOSIS — Z86.73 HISTORY OF STROKE: Chronic | ICD-10-CM

## 2022-07-06 DIAGNOSIS — M79.7 FIBROMYALGIA: Chronic | ICD-10-CM

## 2022-07-06 DIAGNOSIS — E78.00 HYPERCHOLESTEREMIA: ICD-10-CM

## 2022-07-06 PROCEDURE — 99213 OFFICE O/P EST LOW 20 MIN: CPT | Mod: 95 | Performed by: INTERNAL MEDICINE

## 2022-07-06 ASSESSMENT — FIBROSIS 4 INDEX: FIB4 SCORE: 1.65

## 2022-07-06 NOTE — PROGRESS NOTES
Telemedicine Video Visit: Established Patient   This Remote Face to Face encounter was conducted via Zoom. Given the importance of social distancing and other strategies recommended to reduce the risk of COVID-19 transmission, I am providing medical care to this patient via audio/video visit in place of an in person visit at the request of the patient. Verbal consent to telehealth, risks, benefits, and consequences were discussed. Patient retains the right to withdraw at any time. All existing confidentiality protections apply. The patient has access to all transmitted medical information. No dissemination of any patient images or information to other entities without further written consent.The patient was in a private location in the state [facility].   The patient's identity was confirmed and verbal consent was obtained for this virtual visit.    Subjective:     Chief Complaint   Patient presents with    Atrial Fibrillation     F/V Dx: PAF (paroxysmal atrial fibrillation) (HCC)       Julio Gonzalez is a 76 y.o. male presenting for evaluation and management of:    History of previous CVA related to atrial fibrillation.  Status post surgical maze.  Hypertension on good regimen.  On potassium repletion.  No chest pain or shortness of breath.  Some residual aphasia from his stroke.  Patient states has been stable    ROS   Denies any recent fevers or chills. No nausea or vomiting. No chest pains or shortness of breath.     Allergies   Allergen Reactions    Dye Fdc Blue [Brilliant Blue Fcf]      Angiogram contrast sensitivity, became very irritable       Current medicines (including changes today)  Current Outpatient Medications   Medication Sig Dispense Refill    [START ON 7/13/2022] oxyCODONE-acetaminophen (PERCOCET-10)  MG Tab Take 1 Tablet by mouth every 6 hours as needed for Severe Pain (back pain) for up to 30 days. 120 Tablet 0    [START ON 8/12/2022] oxyCODONE-acetaminophen (PERCOCET-10)  MG  Tab Take 1 Tablet by mouth every 6 hours as needed for Severe Pain (back pain) for up to 30 days. 120 Tablet 0    [START ON 9/11/2022] oxyCODONE-acetaminophen (PERCOCET-10)  MG Tab Take 1 Tablet by mouth every 6 hours as needed for Severe Pain for up to 30 days. 120 Tablet 0    simvastatin (ZOCOR) 20 MG Tab TAKE ONE TABLET BY MOUTH EVERY EVENING 100 Tablet 2    zolpidem (AMBIEN) 10 MG Tab TAKE ONE TABLET BY MOUTH AT BEDTIME AS NEEDED FOR SLEEP FOR UP TO 90 DAYS. 90 Tablet 1    triamterene-hctz (MAXZIDE-25/DYAZIDE) 37.5-25 MG Tab Take 2 Tablets by mouth every day. 180 Tablet 3    naproxen (NAPROSYN) 500 MG Tab TAKE ONE TABLET BY MOUTH TWICE A DAY AS NEEDED FOR PAIN -GENERIC FOR NAPROSYN 180 Tablet 3    DULoxetine (CYMBALTA) 60 MG Cap DR Particles delayed-release capsule TAKE ONE CAPSULE BY MOUTH DAILY 90 Capsule 3    potassium chloride SA (KLOR-CON M20) 20 MEQ Tab CR Take 2 po qam, 1 po at noon, 2 po qpm 450 Tablet 2    amLODIPine (NORVASC) 10 MG Tab TAKE ONE TABLET BY MOUTH DAILY 100 Tablet 3    losartan (COZAAR) 100 MG Tab TAKE ONE TABLET BY MOUTH DAILY 100 Tablet 3    loratadine (CLARITIN) 10 MG Tab       Multiple Vitamins-Minerals (MULTI COMPLETE PO) Take  by mouth.      aspirin (ASA) 81 MG Chew Tab chewable tablet Take 1 Tab by mouth every day. 100 Tab 11    polyethylene glycol/lytes (MIRALAX) PACK Take 17 g by mouth every day.      docusate sodium (COLACE) 100 MG CAPS Take 100 mg by mouth every day.      vitamin D (CHOLECALCIFEROL) 1000 UNIT TABS Take 6,000 Units by mouth every day.       No current facility-administered medications for this visit.       Patient Active Problem List    Diagnosis Date Noted    Impaired glucose metabolism 11/08/2018    Fibromyalgia 03/26/2016    S/P Maze operation for atrial fibrillation 11/18/2015    Opiate dependence, continuous (CMS-HCC) 05/20/2015    Knee arthritis 09/03/2014    Depression, major, in full remission (HCC) 12/06/2013    Chronic pain 12/06/2013    Insomnia  "02/08/2013    Hypercholesteremia 02/13/2012    History of stroke 08/05/2011    S/p heel left surgery 11/02/2010    Obesity 09/17/2009    S/P hip replacement 09/16/2009    S/p lumbar surgery 09/16/2009    Neck pain 09/16/2009    Hypertension 09/16/2009    Paroxysmal atrial fibrillation (HCC) 09/16/2009    Dyslipidemia 09/16/2009    History of squamous cell carcinoma 09/16/2009    Preventative health care 09/16/2009    S/P knee replacement 09/16/2009       Family History   Problem Relation Age of Onset    Heart Disease Brother         atrial fib    Cancer Sister         breast cancer    Heart Disease Unknown     Hypertension Unknown     Stroke Unknown        He  has a past medical history of Aphasia, Arrhythmia, Arthritis, Hypercholesteremia, Hypertension, MEDICAL HOME, Pain, Stroke (HCC) (1999), and Unspecified cerebral artery occlusion without mention of cerebral infarction (2/13/2012).  He  has a past surgical history that includes hip replacement, total (2002/2004); other cardiac surgery (2000); other abdominal surgery (1981); knee replacement, total (2001); vein ligation (1987); other (1994); other (1999); block epidural steroid injection (2001); colonoscopy (2001/2007); angiogram (1999); eswt (12/9/2009); pr inj dx/ther agnt paravert facet joint, jessica* (11/4/2011); pr inj dx/ther agnt paravert facet joint, ce* (11/4/2011); maze procedure; fusion, spine, lumbar, plif (9/26/2012); lumbar decompression (9/26/2012); maze procedure; and knee replacement, total (2002).       Objective:   Vitals obtained by patient:  /79 (BP Location: Left arm, Patient Position: Sitting, BP Cuff Size: Adult)   Pulse 78   Ht 1.702 m (5' 7\")   Wt 122 kg (268 lb)   SpO2 93%   BMI 41.97 kg/m²     Physical Exam:  Constitutional: Alert, no distress, well-groomed.         Assessment and Plan:   The following treatment plan was discussed:     1. History of stroke    2. Paroxysmal atrial fibrillation (HCC)    3. " Hypercholesteremia    4. Fibromyalgia    5. S/P Maze operation for atrial fibrillation    6. Primary hypertension  1.  Hypertension continue current regimen.  2.  Hypokalemia on replacement.  Most likely related to hydrochlorothiazide.  3.  Hyperlipidemia on statins.  4.  Old CVA.  5.  Status post surgical maze.  6.  Video follow-up in 1 year          Face to Face Video Visit:   I spent 15 minutes with patient/guardian and I conducted this visit with audio and video present.  Irineo Skinner M.D.

## 2022-08-11 ENCOUNTER — TELEPHONE (OUTPATIENT)
Dept: MEDICAL GROUP | Facility: MEDICAL CENTER | Age: 77
End: 2022-08-11
Payer: MEDICARE

## 2022-08-11 NOTE — TELEPHONE ENCOUNTER
Julio Gonzalez's wife Juliana  [phone] (home)       Juliana would like you to change refill date on Julio's Hydrocodone as Sept 11 is a Sunday and the pharmacy is closed. [facility] told her she could not  one day early. You must send a new Rx with a  date of 9/10.

## 2022-08-12 DIAGNOSIS — M54.9 BACK PAIN, UNSPECIFIED BACK LOCATION, UNSPECIFIED BACK PAIN LATERALITY, UNSPECIFIED CHRONICITY: ICD-10-CM

## 2022-08-12 RX ORDER — OXYCODONE AND ACETAMINOPHEN 10; 325 MG/1; MG/1
1 TABLET ORAL EVERY 6 HOURS PRN
Qty: 120 TABLET | Refills: 0 | Status: SHIPPED | OUTPATIENT
Start: 2022-09-10 | End: 2022-09-26

## 2022-09-26 ENCOUNTER — OFFICE VISIT (OUTPATIENT)
Dept: MEDICAL GROUP | Facility: MEDICAL CENTER | Age: 77
End: 2022-09-26
Payer: MEDICARE

## 2022-09-26 VITALS
OXYGEN SATURATION: 92 % | BODY MASS INDEX: 40.47 KG/M2 | DIASTOLIC BLOOD PRESSURE: 68 MMHG | SYSTOLIC BLOOD PRESSURE: 144 MMHG | WEIGHT: 267 LBS | TEMPERATURE: 97.5 F | HEART RATE: 80 BPM | HEIGHT: 68 IN

## 2022-09-26 DIAGNOSIS — Z23 NEEDS FLU SHOT: ICD-10-CM

## 2022-09-26 DIAGNOSIS — I10 PRIMARY HYPERTENSION: ICD-10-CM

## 2022-09-26 DIAGNOSIS — G89.29 OTHER CHRONIC PAIN: ICD-10-CM

## 2022-09-26 DIAGNOSIS — E78.5 DYSLIPIDEMIA: Chronic | ICD-10-CM

## 2022-09-26 DIAGNOSIS — I48.0 PAROXYSMAL ATRIAL FIBRILLATION (HCC): Chronic | ICD-10-CM

## 2022-09-26 DIAGNOSIS — M54.9 BACK PAIN, UNSPECIFIED BACK LOCATION, UNSPECIFIED BACK PAIN LATERALITY, UNSPECIFIED CHRONICITY: ICD-10-CM

## 2022-09-26 DIAGNOSIS — Z00.00 PREVENTATIVE HEALTH CARE: Chronic | ICD-10-CM

## 2022-09-26 PROCEDURE — 99214 OFFICE O/P EST MOD 30 MIN: CPT | Mod: 25 | Performed by: INTERNAL MEDICINE

## 2022-09-26 PROCEDURE — 90662 IIV NO PRSV INCREASED AG IM: CPT | Performed by: INTERNAL MEDICINE

## 2022-09-26 PROCEDURE — G0008 ADMIN INFLUENZA VIRUS VAC: HCPCS | Performed by: INTERNAL MEDICINE

## 2022-09-26 RX ORDER — TRIAMTERENE AND HYDROCHLOROTHIAZIDE 37.5; 25 MG/1; MG/1
CAPSULE ORAL
COMMUNITY
Start: 2022-08-23 | End: 2022-12-29

## 2022-09-26 RX ORDER — ZOLPIDEM TARTRATE 10 MG/1
TABLET ORAL
COMMUNITY
Start: 2004-01-01 | End: 2022-11-16

## 2022-09-26 RX ORDER — OXYCODONE AND ACETAMINOPHEN 10; 325 MG/1; MG/1
1 TABLET ORAL EVERY 6 HOURS PRN
Qty: 120 TABLET | Refills: 0 | Status: SHIPPED | OUTPATIENT
Start: 2022-10-10 | End: 2022-11-09

## 2022-09-26 RX ORDER — OXYCODONE AND ACETAMINOPHEN 10; 325 MG/1; MG/1
1 TABLET ORAL EVERY 6 HOURS PRN
Qty: 120 TABLET | Refills: 0 | Status: SHIPPED | OUTPATIENT
Start: 2022-11-09 | End: 2022-12-09

## 2022-09-26 RX ORDER — OXYCODONE AND ACETAMINOPHEN 10; 325 MG/1; MG/1
1 TABLET ORAL EVERY 6 HOURS PRN
Qty: 120 TABLET | Refills: 0 | Status: SHIPPED | OUTPATIENT
Start: 2022-12-09 | End: 2022-12-29 | Stop reason: SDUPTHER

## 2022-09-26 ASSESSMENT — FIBROSIS 4 INDEX: FIB4 SCORE: 1.68

## 2022-09-26 NOTE — PROGRESS NOTES
Subjective     Julio Gonzalez is a 77 y.o. male who presents with pain medication refill Follow-Up            HPI    Patient here with his wife, follow-up pain medication refill, currently on Percocet 10 mg tablets, 1 p.o. 4 times daily quantity 120 for 30 days for chronic back and leg pain.  Medication has been beneficial for controlling his back pain, he has seen pain management Dr. Hwang in the past, has tried NSAIDs, pregabalin, hydrocodone, physical therapy, Cymbalta, steroid injections with no benefit and Percocet has been beneficial allowing him to keep active at home and perform his ADLs.  The Percocet does not cause drowsiness, sedation, memory loss, depression.  Occasional constipation but he takes a stool softener and Metamucil or MiraLAX daily as needed.  Tries to incorporate fiber in his diet.  Blood pressures been stable at home followed by cardiology for paroxysmal atrial fibrillation, will feel an occasional blip but no palpitations, chest pain, lightheadedness.  Occasional blip might be related to anxiety, he really does not drink caffeine other than tea.  Remains on losartan, Dyazide, amlodipine, potassium.  Most recent visit with cardiology July 6.  Dyslipidemia remains on simvastatin.    Medications, allergies, medical history, surgical history, social history, family history  reviewed and updated         Current Outpatient Medications   Medication Sig Dispense Refill    zolpidem (AMBIEN) 10 MG Tab       naproxen (NAPROSYN) 500 MG Tab TAKE ONE TABLET BY MOUTH TWICE A DAY AS NEEDED FOR PAIN -GENERIC FOR NAPROSYN 180 Tablet 3    triamterene/hctz (MAXZIDE-25/DYAZIDE) 37.5-25 MG Cap       amLODIPine (NORVASC) 10 MG Tab TAKE ONE TABLET BY MOUTH DAILY 90 Tablet 2    potassium chloride SA (KLOR-CON M20) 20 MEQ Tab CR TAKE 2 TABLETS BY MOUTH EVERY MORNING, 1 TABLET AT NOON, 2 TABLETS EVERY EVENING. CHANGING THE DOSING PER DAY FROM 2 TABLETS TWICE A DAY, ADDING EXTRA DOSE AT MIDDAY. 450 Tablet 3     simvastatin (ZOCOR) 20 MG Tab TAKE ONE TABLET BY MOUTH EVERY EVENING 100 Tablet 2    triamterene-hctz (MAXZIDE-25/DYAZIDE) 37.5-25 MG Tab Take 2 Tablets by mouth every day. 180 Tablet 3    DULoxetine (CYMBALTA) 60 MG Cap DR Particles delayed-release capsule TAKE ONE CAPSULE BY MOUTH DAILY 90 Capsule 3    losartan (COZAAR) 100 MG Tab TAKE ONE TABLET BY MOUTH DAILY 100 Tablet 3    loratadine (CLARITIN) 10 MG Tab       Multiple Vitamins-Minerals (MULTI COMPLETE PO) Take  by mouth.      aspirin (ASA) 81 MG Chew Tab chewable tablet Take 1 Tab by mouth every day. 100 Tab 11    polyethylene glycol/lytes (MIRALAX) PACK Take 17 g by mouth every day.      docusate sodium (COLACE) 100 MG CAPS Take 100 mg by mouth every day.      vitamin D (CHOLECALCIFEROL) 1000 UNIT TABS Take 6,000 Units by mouth every day.       No current facility-administered medications for this visit.                       Status post lumbar surgery  2/01 MRI lumbar spine DJD L5-S1 anterolisthesis 4-5 mm marked stenosis saw  neurosurgery  9/09  neurosurgery note  12/09 neurosurgery note, severe left-sided foraminal stenosis, L5-S1 spondylolisthesis 4-5 mm recommend surgery, patient declined, has had epidural with no benefit,tried lyrica and neurontin before, declines cymbalta trial  4/10 note dr. johnson neurosurgery who gives patient norco, he is retiring, and has offered patient surgical therapy versus continuing norco 10 mg which we'll assume dispensing.  10/11 MRI lumbar spine grade 1 spondylolisthesis 9mm, bilateral spondylosis L5, moderate bilateral L5 stenosis, 2.4 cm left kidney cyst  10/11 xray LS, grade 1 spondylolisthesis L5 on S1 increased from prior exam, 1.3 cm, no significant change in flexion  11/11  pain note right L5-S1 transforaminal epidural  1/16/12 start cymbalta trial (3/12 stop cymbalta no benefit)  1/12 dr. murphy neurosurgery note surgical intervention offered L4 to sacrum decompression  3/26/12  restarted cymbalta    9/26/12  neurosurgery operative note decompression at L5-S1and bilateral foraminotomies,transforaminal lumbar interbody fusion, L4-L5 and L5-S1, with expandable cage 8-12 mm.  2/20/13 dr. murphy neurosurgery note, lumbar films, EMG NCS lower ext inconclusive, repeat MRI lumbar spine pending  3/13/13 MRI lumbar spine postoperative changes L4-S1 lumbar laminectomy, interbody fusion, disc space insert L4-L5 L5-S1, posterior spinal fusion and fixation, L5-S1 moderate right foraminal stenosis. 2.4 cm mid right renal cyst unchanged  3/26/13 dr. murphy neurosurgery note, no further surgical intervention necessary, chronic narcotics norco 10 mg 5 per day; I will assume the norco rx from   1/25/15 off naprosyn and cymbalta; on norco 10 mg one every four hours #150  5/21/15 on percocet 10 mg five per day and on cymbalta 60 mg  3/25/16 start lyrica 50 mg tid for fibromyalgia, continue percocet 10 mg 5 per day and cymbalta 60 mg  4/12/16 increase lyrica to 100 mg tid (50 mg two tid), continue cymbalta 60 mg and percocet 10 mg 5 per day  9/26/16 off lyrica, on percocet 10 mg qid per  pain management and cymbalta  7/9/19 on percocet 10 mg #140 per 28 days from  pain management also on cymbalta 60 mg and naprosyn 500 mg bid  10/7/20  pain note on oxycodone #140 per 28 days will decrease to #112 per 28 days  11/4/20  pain note on oxycodone #112 per 28 days   10/7/21 I am taking over percocet refill, 4 per day #120 per 30 days  10/7/21   12/13/21 percocet 10 mg #120     Status post knee replacement left 2001     Status post hip replacement bilateral  12/03 right total hip replacement  1/04 left total hip replacement     s/p heel surgery  12/09  left foot heel spur surgery     Preventative health  4/6/07 colonoscopy [facility] negative  10/26/10 zoster vaccine  8/27/15 prevnar at [facility]  12/12/19 tdap  12/12/19 pneumovax  8/2/21 declines  colon  8/13/21 FIT negative  8/24/21 psa 2.2  8/24/21 hep c ab negative  10/7/21 shingrix second  10/14/21 covid pfizer booster  6/22/22 vit d 85     Paroxysmal atrial fibrillation  Sees [facility]  2005 s/p maze procedure with a stroke related to that as a complication, have no records at all regarding that, no CT scan or MRI scan and probably had a cardiac catheterization by renal physicians in 2005 that was negative for coronary artery disease  8/11 [facility] note EKG NSR  5/12 [facility] note, on asa daily  11/12 [facility] note  4/29/13 cardiology note  11/3/13 cardiology note on asa  9/22/14 cardiology note sinus, follow up 6 months  5/12/15  cardiology note, paroxysmal atrial fibrillation status post maze operation, sinus rhythm  11/18/15 cardiology note stable follow one year  4/4/17  cardiology note, paroxysmal atrial fibrillation no recurrence, follow-up one year  2/5/19 cardiology note paroxysmal atrial fibrillation, sinus on exam, no recurrence of atrial fibrillation status post maze, follow-up 1 year  6/16/20 cardiology note atrial fibrillation status post maze no recurrence, hypertension stable on current regimen, work on weight loss, follow-up 6 months  7/13/21  cardiology note  1/4/22 declines overnight pulse oximetry  7/6/22 cardiology no changes      neck pain  3/06 MRI cervical spine moderate subdural frontal stenosis C5-C6 lesser extent C6-C7     knee arthritis   9/3/14 x-ray right knee marked tricompartmental osteoarthritis, most severe medial compartment  2/19/16 has seen  orthopedics, declines knee replacement       Insomnia  2009 on ambien 10 mg  2/19/16 unable to taper ambien, referral to behavioral sleep psychology recommended he declines  9/26/16 on ambien 10 mg work on taper, declines referral to sleep psychology  7/9/18 on ambien declines sleep management referral  7/20/20 taper ambien if possible     Impaired glucose metabolism  11/8/18 A1c 6%  12/6/19 A1c 5.8%  8/24/21 A1c 5.5%       Hypertension  7/10 [facility] note change from hyzaar to enalapril hct 10/25  1/11 [facility] note bp not controlled on enalapril hct 10/25, change back to losartan hct 100/25 and norvasc 10 mg  6/12 K+ 3.0, increase kdur to 20 tid, consider decreasing hctz if possible to decrease K+ supplementation  12/12 cozaar 100 mg, dyazide 37.5/25 mg, kcl 20 meq, norvasc 10 mg  1/29/14 on cozaar 100 mg, dyazide 37.5/25 mg, norvasc 10 mg, klor 20 meq bid; will increase to klor 20 meq tid  9/3//14 K+ 3.1 on klor 20 meq tid, increase to 20 meq two tab bid  9/3/14 urine mac <0.5 on cozaar 100 mg, dyazide 37.5/25 mg, norvasc 10 mg, klor 20 meq two tab bid  6/30/15 Na 132,K+ 3.5 on cozaar 100 mg, dyazide 37.5/25 mg, norvasc 10 mg, klor 20 meq two tab bid  8/31/16 K+ 3.3 on cozaar 100 mg, dyazide 37.5/25 mg, norvasc 10 mg, klor 20 meq two tab qday will increase to 2 bid  7/11/17 K+3.3 on cozaar 100 mg, dyazide 37.5/25 mg, norvasc 10 mg, klor 20 meq two bid  2/5/19 cardiology note paroxysmal atrial fibrillation, sinus on exam, no recurrence of atrial fibrillation status post maze, follow-up 1 year  12/6/19 K+3.5 on cozaar 100 mg, dyazide 37.5/25 mg, norvasc 10 mg, klor 20 meq two bid  6/16/20 cardiology note atrial fibrillation status post maze no recurrence, hypertension stable on current regimen, work on weight loss, follow-up 6 months  7/13/21  cardiology note on cozaar 100 mg, dyazide 37.5/25 mg, norvasc 10 mg, klor 20 meq two bid  8/24/21 K+ 3.4 on klor 20 meq two bid recommend increase to 2 am, 1 noon, 2 pm  6/22/22 K+ 3.4 on klor 20 meq 2 am, 1 noon, 2 pm on cozaar 100 mg, dyazide 37.5/25 mg, norvasc 10 mg   7/6/22 cardiology no changes      History stroke  1999 affected right arm, no old records  Some difficulty with short recall, and occasional diff with expressing self when fatigued     History squamous cell carcinoma  Saw  dermatology offered aldara he declined  3/27/17  dermatology note  5/1/17   dermatology biopsy proven squamous cell carcinoma in situ left forehead, here for mohs  9/19/17  dermatology note continue      Fibromyalgia  3/25/16 start lyrica 50 mg tid for fibromyalgia, continue percocet 10 mg 5 per day and cymbalta 60 mg  4/12/16 increase lyrica to 100 mg tid (50 mg two tid), continue cymbalta 60 mg and percocet 10 mg 5 per day  4/28/16 increase lyrica to 150 mg tid, continue cymbalta 60 mg and percocet 10 mg five times per day  5/10/16 on lyrica 50 mg three tabs tid, change to 100 mg am, 150 mg noon, 100 mg pm     Dyslipidemia  Followed by cardiology  2/09 cholesterol 135, triglycerides 100, HDL 47, LDL 68  8/09 chol 138,trig 101,hdl 49,ldl 69  3/10 chol 123,trig 66,hdl 49,ldl 61  7/10 [facility] note change vytorin to zocor 40  11/10 chol 141,trig 75,hdl 62,ldl 64 on vytorin 10/20 mg per [facility]  10/11 chol 159,trig 102,hdl 52,ldl 87 on vytorin 10/20 per [facility]  2/29/12 stop vytorin, change to zocor 20 mg per [facility]  6/12 chol 152,trig 102,hdl 49,ldl 83 on zocor 20 mg  6/13 chol 150,trig 130,hdl 52,ldl 72 on zocor 20 mg  1/29/14 chol 147,trig 127,hdl 50,ldl 72 on zocor 20 mg  9/3/14 chol 169,trig 228,hdl 49,ldl 74 on zocor 20 mg  6/30/15 chol 151,trig 88,hdl 63,ldl 70 on zocor 20 mg  8/31/16 chol 169,trig 130,hdl 59,ldl 84 on zocor 20 mg  7/11/17 chol 147,trig 146,hdl 51,ldl 67 on zocor 20 mg  11/8/18 chol 189,trig 215,hdl 54,ldl 92 on zocor 20 mg  12/6/19 chol 163,trig 214,hdl 54,ldl 66 on zocor 20 mg  8/24/21 chol 166,trig 142,hdl 57,ldl 81 on zocor 20 mg  6/22/22 chol 149,trig 139,hdl 48,ldl 73 on zocor 20 mg     Depression  3/12 tried cymalta for pain no benefit  12/6/13 PHQ 9 score 15, start cymbalta samples 30 mg x 7 days, then 60 mg  12/20/13 cymbalta 60 mg  1/22/15 off cymbalta    5/21/15 on cymbalta 60 mg  2/19/16 referral to behavioral health recommended he declines  11/29/16 depression screening score 0, continues on cymbalta  1/30/18 depression screening score 0 on  cymbalta  8/2/21 on cymbalta screening 0 score   1/4/22 on cymbalta screening 0/3 score     Chronic opiate  3/26/13 dr. murphy neurosurgery note, no further surgical intervention necessary, chronic narcotics norco 10 mg 5 per day; I will assume the norco rx from   12/6/13 change from hydrocodone 10 mg 4-5 tablets per day to oxycodone 10 mg 4-5 tabs per day  1/22/15 UDT millenium consistent  2/24/15 change from hydrocodone 10 mg to oxycodone 10 mg (percocet) qid #150 per 30 days  1/22/14 norco 10 mg #180, millenium URT done  2/24/15 change to percocet 10 mg #150 only one prescription provided, if successful for pain relief call us and I will write next 3 refills that he can  and then followup in office after that  5/21/15 percocet 10 mg #150 refill  5/21/15 narcotic contract  5/21/15 opiate risk score 1  8/24/15 refill percocet #150 x 3  8/24/15 referral pain management  10/4/15   2/19/16   2/19/16 increase frequency to percocet 10 mg q 4 hours #180 per month, declined trial of MS contin, because of increasing utilization, referral made to pain management   5/24/16   5/24/16 UDT millennium  5/24/16 refill percocet #150 x 2 refer to  pain management to assume opiate prescription writing   6/22/16  pain management note, initiate pregabalin, work on taper oxycodone  7/6/16  continue oxycodone and pregabalin  8/9/16  pain management note continue oxycodone, consider ultrasound-guided knee injections to right  9/12/16  pain management note, UDT consistent  7/9/19 on percocet 10 mg #140 per 28 days from  pain management also on cymbalta 60 mg and naprosyn 500 mg bid  7/9/19 referral [facility]   7/20/20 on percocet 10 mg 5 per day per  pain management, cymbalta 60 mg, naprosyn 500 mg bid   4/23/21 6/23/21  pain note on percocet 10 mg one every 4 hours #112 per 28 days  6/23/21  pain note on percocet 10 mg one  "every 4 hours #112 per 28 days  6/23/21  pain note on percocet 10 mg one every 4 hours #112 per 28 days  10/7/21 I am taking over percocet refill, 4 per day #120 per 30 days  10/7/21   12/13/21 percocet 10 mg #120  1/4/22   1/4/22 controlled substance contract  3/30/22   3/30/22 UDT   6/27/22   Has tried hydrocodone, NSAIDs, pregabalin, steroid injections, physical therapy, cymbalta                  Patient Active Problem List   Diagnosis    S/P hip replacement    S/p lumbar surgery    Neck pain    Hypertension    Paroxysmal atrial fibrillation (HCC)    Dyslipidemia    History of squamous cell carcinoma    Preventative health care    S/P knee replacement    Obesity    S/p heel left surgery    History of stroke    Insomnia    Depression, major, in full remission (HCC)    Chronic pain    Knee arthritis    Opiate dependence, continuous (CMS-HCC)    S/P Maze operation for atrial fibrillation    Fibromyalgia    Impaired glucose metabolism       Depression Screening             Patient Care Team:  Norman Peterson M.D. as PCP - General  Normandary Peterson M.D. as PCP - [facility] Paneled  Jose Martin Campbell M.D. as Consulting Physician (Anesthesiology)  Irineo Skinner M.D. as Consulting Physician (Internal Medicine Clinical Cardiac Electrophysiology)  Pete Rangel O.D. as Consulting Physician (Optometry)  Reji Roberson as [facility]     ROS           Objective     BP (!) 144/68 (BP Location: Right arm, Patient Position: Sitting, BP Cuff Size: Large adult)   Pulse 80   Temp 36.4 °C (97.5 °F)   Ht 1.715 m (5' 7.5\")   Wt 121 kg (267 lb)   SpO2 92%   BMI 41.20 kg/m²      Physical Exam  Vitals and nursing note reviewed.   Constitutional:       Appearance: Normal appearance.   HENT:      Head: Normocephalic and atraumatic.      Right Ear: External ear normal.      Left Ear: External ear normal.   Eyes:      Conjunctiva/sclera: Conjunctivae normal.   Cardiovascular:      Rate and " Rhythm: Normal rate and regular rhythm.      Heart sounds: Normal heart sounds.   Pulmonary:      Effort: Pulmonary effort is normal.      Breath sounds: Normal breath sounds.   Abdominal:      General: There is no distension.   Musculoskeletal:         General: No swelling.   Skin:     Findings: No bruising.   Neurological:      General: No focal deficit present.      Mental Status: He is alert.   Psychiatric:         Mood and Affect: Mood normal.         Behavior: Behavior normal.                           Assessment & Plan        Assessment  #1 chronic leg and back pain with previous history of lumbar fusion decompression, also status post hip replacement, knee replacement, heel surgery, has seen neurosurgery, physical therapy, pain management, currently stable on Percocet 10 mg 4 times daily having seen Dr. Hwang from pain management as recently as 2 years ago    #2 chronic opiate therapy Percocet 10 mg daily quantity 120 for 30 days, last refill September 10, medication does cause occasional constipation but he takes a stool softener and Metamucil or MiraLAX, also tries to eat fiber in his diet, medication does not cause drowsiness, sedation, depression, memory loss, mood changes      #3 paroxysmal atrial fibrillation followed by cardiology status post Maze procedure occasional skipped symptoms but no significant palpitations    #4 hypertension stable on amlodipine, Dyazide, losartan, potassium supplementation with most recent potassium 3.4 on K-Felisha 20 mEq 2 AM, 1 at noon, 2 every evening    #5 dyslipidemia 6/22/22 chol 149,trig 139,hdl 48,ldl 73 on zocor 20 mg      Plan  #1 continue Percocet, medication is effective without significant side effects, continue fiber supplementation and stool softeners as needed, Percocet provides relief without significant side effects I believe the medication is beneficial and benefits outweigh risks    #2 most recent controlled substance contract in January, urine drug screen  in March,  reviewed today, prescriptions for the next 3 months sent to his pharmacy for the Percocet 30 days at a time monitoring for side effects of drowsiness, sedation, memory loss, constipation    #3 continue to check blood pressure regularly, follow-up cardiology    #4 continue to monitor carbohydrate content and portion sizes, limit sweets, limiting caffeine intake    #5 continue Zocor    #6 high-dose flu vaccine today, he can have the COVID-vaccine at the pharmacy    #7 follow-up 3 months

## 2022-10-18 DIAGNOSIS — I10 ESSENTIAL HYPERTENSION: ICD-10-CM

## 2022-10-18 DIAGNOSIS — I48.0 PAF (PAROXYSMAL ATRIAL FIBRILLATION) (HCC): ICD-10-CM

## 2022-10-20 NOTE — TELEPHONE ENCOUNTER
Is the patient due for a refill? Yes    Was the patient seen the past year? Yes    Date of last office visit: 7/06/22    Does the patient have an upcoming appointment?  No    Provider to refill:DS    Does the patients insurance require a 100 day supply?  Yes

## 2022-10-21 RX ORDER — LOSARTAN POTASSIUM 100 MG/1
TABLET ORAL
Qty: 100 TABLET | Refills: 2 | Status: SHIPPED | OUTPATIENT
Start: 2022-10-21 | End: 2023-08-11

## 2022-11-16 DIAGNOSIS — M25.561 RIGHT KNEE PAIN, UNSPECIFIED CHRONICITY: ICD-10-CM

## 2022-12-29 ENCOUNTER — OFFICE VISIT (OUTPATIENT)
Dept: MEDICAL GROUP | Facility: MEDICAL CENTER | Age: 77
End: 2022-12-29
Payer: MEDICARE

## 2022-12-29 VITALS
HEIGHT: 67 IN | DIASTOLIC BLOOD PRESSURE: 86 MMHG | WEIGHT: 273 LBS | SYSTOLIC BLOOD PRESSURE: 158 MMHG | OXYGEN SATURATION: 93 % | TEMPERATURE: 97.9 F | BODY MASS INDEX: 42.85 KG/M2 | HEART RATE: 114 BPM

## 2022-12-29 DIAGNOSIS — G89.29 OTHER CHRONIC PAIN: ICD-10-CM

## 2022-12-29 DIAGNOSIS — F33.41 RECURRENT MAJOR DEPRESSIVE DISORDER, IN PARTIAL REMISSION (HCC): Chronic | ICD-10-CM

## 2022-12-29 DIAGNOSIS — I48.0 PAROXYSMAL ATRIAL FIBRILLATION (HCC): Chronic | ICD-10-CM

## 2022-12-29 DIAGNOSIS — M54.9 BACK PAIN, UNSPECIFIED BACK LOCATION, UNSPECIFIED BACK PAIN LATERALITY, UNSPECIFIED CHRONICITY: ICD-10-CM

## 2022-12-29 DIAGNOSIS — F11.20 OPIATE DEPENDENCE, CONTINUOUS (HCC): Chronic | ICD-10-CM

## 2022-12-29 DIAGNOSIS — I10 PRIMARY HYPERTENSION: Chronic | ICD-10-CM

## 2022-12-29 DIAGNOSIS — M17.11 ARTHRITIS OF KNEE, RIGHT: Chronic | ICD-10-CM

## 2022-12-29 PROCEDURE — 99214 OFFICE O/P EST MOD 30 MIN: CPT | Performed by: INTERNAL MEDICINE

## 2022-12-29 RX ORDER — OXYCODONE AND ACETAMINOPHEN 10; 325 MG/1; MG/1
1 TABLET ORAL EVERY 6 HOURS PRN
Qty: 120 TABLET | Refills: 0 | Status: SHIPPED | OUTPATIENT
Start: 2023-02-10 | End: 2023-01-09 | Stop reason: SDUPTHER

## 2022-12-29 RX ORDER — OXYCODONE AND ACETAMINOPHEN 10; 325 MG/1; MG/1
1 TABLET ORAL EVERY 6 HOURS PRN
Qty: 120 TABLET | Refills: 0 | Status: SHIPPED | OUTPATIENT
Start: 2023-03-12 | End: 2023-01-09 | Stop reason: SDUPTHER

## 2022-12-29 RX ORDER — OXYCODONE AND ACETAMINOPHEN 10; 325 MG/1; MG/1
1 TABLET ORAL EVERY 6 HOURS PRN
Qty: 120 TABLET | Refills: 0 | Status: SHIPPED | OUTPATIENT
Start: 2023-01-11 | End: 2023-01-09 | Stop reason: SDUPTHER

## 2022-12-29 ASSESSMENT — FIBROSIS 4 INDEX: FIB4 SCORE: 1.68

## 2022-12-29 NOTE — PROGRESS NOTES
Subjective     Julio Gonzalez is a 77 y.o. male who presents with med refill Follow-Up            HPI    Patient is here with his wife, for medication refill currently on Percocet 10 mg 4 times a day quantity 120 for 30 days, 4 times daily last refill December 12, patient had been followed by pain management  until this past year as I took over his refills October 2021, and he has remained on the dosing regimen without drowsiness, sedation, memory loss, depression.  Morphine equivalent 60 but patient has not had any side effects and has been stable and controlled on the Percocet since 2015 and has been able to decrease from quantity 150 down to 120 for 30 days and that time.  We have agreed that with his morphine equivalent being high that he already saw pain management and has maintained on the dose and that I would continue to prescribe the medication for him as long as his quantity does not change for 30 days.  Occasional constipation in the past but takes MiraLAX daily.  Blood pressure 130s/70s checking regularly on losartan, Dyazide, amlodipine followed by cardiology for paroxysmal atrial fibrillation, no palpitations or chest pain.  Patient does admit to eating more sweets during the holidays.  Has gained some weight with the holidays.  No regular exercise.  Chronic back pain and knee pain has remained stable and controlled on the Percocet.  Has seen physical therapy, pain management, surgery in the past.  Has tried NSAIDs, pregabalin, oxycodone, hydrocodone, Cymbalta, has had steroid injections previously.  Chronic insomnia on Ambien with no daytime drowsiness or hangover effect with the medication.           Current Outpatient Medications   Medication Sig Dispense Refill    naproxen (NAPROSYN) 500 MG Tab TAKE ONE TABLET BY MOUTH TWICE A DAY AS NEEDED FOR PAIN -GENERIC FOR NAPROSYN 180 Tablet 3    DULoxetine (CYMBALTA) 60 MG Cap DR Particles delayed-release capsule TAKE ONE CAPSULE BY MOUTH  DAILY 90 Capsule 3    zolpidem (AMBIEN) 10 MG Tab TAKE ONE TABLET BY MOUTH AT BEDTIME AS NEEDED FOR SLEEP FOR UP TO 90 DAYS 90 Tablet 0    losartan (COZAAR) 100 MG Tab TAKE ONE TABLET BY MOUTH DAILY 100 Tablet 2    oxyCODONE-acetaminophen (PERCOCET-10)  MG Tab Take 1 Tablet by mouth every 6 hours as needed for Severe Pain (back and leg pain) for up to 30 days. 120 Tablet 0    amLODIPine (NORVASC) 10 MG Tab TAKE ONE TABLET BY MOUTH DAILY 90 Tablet 2    potassium chloride SA (KLOR-CON M20) 20 MEQ Tab CR TAKE 2 TABLETS BY MOUTH EVERY MORNING, 1 TABLET AT NOON, 2 TABLETS EVERY EVENING. CHANGING THE DOSING PER DAY FROM 2 TABLETS TWICE A DAY, ADDING EXTRA DOSE AT MIDDAY. 450 Tablet 3    simvastatin (ZOCOR) 20 MG Tab TAKE ONE TABLET BY MOUTH EVERY EVENING 100 Tablet 2    triamterene-hctz (MAXZIDE-25/DYAZIDE) 37.5-25 MG Tab Take 2 Tablets by mouth every day. 180 Tablet 3    loratadine (CLARITIN) 10 MG Tab       Multiple Vitamins-Minerals (MULTI COMPLETE PO) Take  by mouth.      aspirin (ASA) 81 MG Chew Tab chewable tablet Take 1 Tab by mouth every day. 100 Tab 11    polyethylene glycol/lytes (MIRALAX) PACK Take 17 g by mouth every day.      docusate sodium (COLACE) 100 MG CAPS Take 100 mg by mouth every day.      vitamin D (CHOLECALCIFEROL) 1000 UNIT TABS Take 6,000 Units by mouth every day.       No current facility-administered medications for this visit.           Status post lumbar surgery  2/01 MRI lumbar spine DJD L5-S1 anterolisthesis 4-5 mm marked stenosis saw  neurosurgery  9/09  neurosurgery note  12/09 neurosurgery note, severe left-sided foraminal stenosis, L5-S1 spondylolisthesis 4-5 mm recommend surgery, patient declined, has had epidural with no benefit,tried lyrica and neurontin before, declines cymbalta trial  4/10 note dr. johnson neurosurgery who gives patient norco, he is retiring, and has offered patient surgical therapy versus continuing norco 10 mg which we'll assume  dispensing.  10/11 MRI lumbar spine grade 1 spondylolisthesis 9mm, bilateral spondylosis L5, moderate bilateral L5 stenosis, 2.4 cm left kidney cyst  10/11 xray LS, grade 1 spondylolisthesis L5 on S1 increased from prior exam, 1.3 cm, no significant change in flexion  11/11  pain note right L5-S1 transforaminal epidural  1/16/12 start cymbalta trial (3/12 stop cymbalta no benefit)  1/12 dr. murphy neurosurgery note surgical intervention offered L4 to sacrum decompression  3/26/12 restarted cymbalta    9/26/12  neurosurgery operative note decompression at L5-S1and bilateral foraminotomies,transforaminal lumbar interbody fusion, L4-L5 and L5-S1, with expandable cage 8-12 mm.  2/20/13 dr. murphy neurosurgery note, lumbar films, EMG NCS lower ext inconclusive, repeat MRI lumbar spine pending  3/13/13 MRI lumbar spine postoperative changes L4-S1 lumbar laminectomy, interbody fusion, disc space insert L4-L5 L5-S1, posterior spinal fusion and fixation, L5-S1 moderate right foraminal stenosis. 2.4 cm mid right renal cyst unchanged  3/26/13 dr. murphy neurosurgery note, no further surgical intervention necessary, chronic narcotics norco 10 mg 5 per day; I will assume the norco rx from   1/25/15 off naprosyn and cymbalta; on norco 10 mg one every four hours #150  5/21/15 on percocet 10 mg five per day and on cymbalta 60 mg  3/25/16 start lyrica 50 mg tid for fibromyalgia, continue percocet 10 mg 5 per day and cymbalta 60 mg  4/12/16 increase lyrica to 100 mg tid (50 mg two tid), continue cymbalta 60 mg and percocet 10 mg 5 per day  9/26/16 off lyrica, on percocet 10 mg qid per  pain management and cymbalta  7/9/19 on percocet 10 mg #140 per 28 days from  pain management also on cymbalta 60 mg and naprosyn 500 mg bid  10/7/20  pain note on oxycodone #140 per 28 days will decrease to #112 per 28 days  11/4/20  pain note on oxycodone #112 per 28 days   10/7/21 I  am taking over percocet refill, 4 per day #120 per 30 days  10/7/21   12/13/21 percocet 10 mg #120     Status post knee replacement left 2001     Status post hip replacement bilateral  12/03 right total hip replacement  1/04 left total hip replacement      s/p heel surgery  12/09  left foot heel spur surgery     Preventative health  4/6/07 colonoscopy [facility] negative  8/27/15 prevnar at [facility]  12/12/19 tdap  12/12/19 pneumovax  8/2/21 declines colon  8/13/21 FIT negative  8/24/21 psa 2.2  8/24/21 hep c ab negative  10/7/21 shingrix second  10/14/21 covid pfizer booster  6/22/22 vit d 85  9/26/22 flu      Paroxysmal atrial fibrillation  Sees [facility]  2005 s/p maze procedure with a stroke related to that as a complication, have no records at all regarding that, no CT scan or MRI scan and probably had a cardiac catheterization by renal physicians in 2005 that was negative for coronary artery disease  8/11 [facility] note EKG NSR  5/12 [facility] note, on asa daily  11/12 [facility] note  4/29/13 cardiology note  11/3/13 cardiology note on asa  9/22/14 cardiology note sinus, follow up 6 months  5/12/15  cardiology note, paroxysmal atrial fibrillation status post maze operation, sinus rhythm  11/18/15 cardiology note stable follow one year  4/4/17  cardiology note, paroxysmal atrial fibrillation no recurrence, follow-up one year  2/5/19 cardiology note paroxysmal atrial fibrillation, sinus on exam, no recurrence of atrial fibrillation status post maze, follow-up 1 year  6/16/20 cardiology note atrial fibrillation status post maze no recurrence, hypertension stable on current regimen, work on weight loss, follow-up 6 months  7/13/21  cardiology note  1/4/22 declines overnight pulse oximetry  7/6/22 cardiology no changes      neck pain  3/06 MRI cervical spine moderate subdural frontal stenosis C5-C6 lesser extent C6-C7     knee arthritis   9/3/14 x-ray right knee marked tricompartmental osteoarthritis,  most severe medial compartment  2/19/16 has seen  orthopedics, declines knee replacement       Insomnia  2009 on ambien 10 mg  2/19/16 unable to taper ambien, referral to behavioral sleep psychology recommended he declines  9/26/16 on ambien 10 mg work on taper, declines referral to sleep psychology  7/9/18 on ambien declines sleep management referral  7/20/20 taper ambien if possible     Impaired glucose metabolism  11/8/18 A1c 6%  12/6/19 A1c 5.8%  8/24/21 A1c 5.5%      Hypertension  7/10 [facility] note change from hyzaar to enalapril hct 10/25  1/11 [facility] note bp not controlled on enalapril hct 10/25, change back to losartan hct 100/25 and norvasc 10 mg  6/12 K+ 3.0, increase kdur to 20 tid, consider decreasing hctz if possible to decrease K+ supplementation  12/12 cozaar 100 mg, dyazide 37.5/25 mg, kcl 20 meq, norvasc 10 mg  1/29/14 on cozaar 100 mg, dyazide 37.5/25 mg, norvasc 10 mg, klor 20 meq bid; will increase to klor 20 meq tid  9/3//14 K+ 3.1 on klor 20 meq tid, increase to 20 meq two tab bid  9/3/14 urine mac <0.5 on cozaar 100 mg, dyazide 37.5/25 mg, norvasc 10 mg, klor 20 meq two tab bid  6/30/15 Na 132,K+ 3.5 on cozaar 100 mg, dyazide 37.5/25 mg, norvasc 10 mg, klor 20 meq two tab bid  8/31/16 K+ 3.3 on cozaar 100 mg, dyazide 37.5/25 mg, norvasc 10 mg, klor 20 meq two tab qday will increase to 2 bid  7/11/17 K+3.3 on cozaar 100 mg, dyazide 37.5/25 mg, norvasc 10 mg, klor 20 meq two bid  2/5/19 cardiology note paroxysmal atrial fibrillation, sinus on exam, no recurrence of atrial fibrillation status post maze, follow-up 1 year  12/6/19 K+3.5 on cozaar 100 mg, dyazide 37.5/25 mg, norvasc 10 mg, klor 20 meq two bid  6/16/20 cardiology note atrial fibrillation status post maze no recurrence, hypertension stable on current regimen, work on weight loss, follow-up 6 months  7/13/21  cardiology note on cozaar 100 mg, dyazide 37.5/25 mg, norvasc 10 mg, klor 20 meq two bid  8/24/21 K+ 3.4 on klor 20  meq two bid recommend increase to 2 am, 1 noon, 2 pm  6/22/22 K+ 3.4 on klor 20 meq 2 am, 1 noon, 2 pm on cozaar 100 mg, dyazide 37.5/25 mg, norvasc 10 mg   7/6/22 cardiology note no changes      History stroke  1999 affected right arm, no old records  Some difficulty with short recall, and occasional diff with expressing self when fatigued     History squamous cell carcinoma  Saw  dermatology offered aldara he declined  3/27/17  dermatology note  5/1/17  dermatology biopsy proven squamous cell carcinoma in situ left forehead, here for mohs  9/19/17  dermatology note continue      Fibromyalgia  3/25/16 start lyrica 50 mg tid for fibromyalgia, continue percocet 10 mg 5 per day and cymbalta 60 mg  4/12/16 increase lyrica to 100 mg tid (50 mg two tid), continue cymbalta 60 mg and percocet 10 mg 5 per day  4/28/16 increase lyrica to 150 mg tid, continue cymbalta 60 mg and percocet 10 mg five times per day  5/10/16 on lyrica 50 mg three tabs tid, change to 100 mg am, 150 mg noon, 100 mg pm     Dyslipidemia  Followed by cardiology  2/09 cholesterol 135, triglycerides 100, HDL 47, LDL 68  8/09 chol 138,trig 101,hdl 49,ldl 69  3/10 chol 123,trig 66,hdl 49,ldl 61  7/10 [facility] note change vytorin to zocor 40  11/10 chol 141,trig 75,hdl 62,ldl 64 on vytorin 10/20 mg per [facility]  10/11 chol 159,trig 102,hdl 52,ldl 87 on vytorin 10/20 per [facility]  2/29/12 stop vytorin, change to zocor 20 mg per [facility]  6/12 chol 152,trig 102,hdl 49,ldl 83 on zocor 20 mg  6/13 chol 150,trig 130,hdl 52,ldl 72 on zocor 20 mg  1/29/14 chol 147,trig 127,hdl 50,ldl 72 on zocor 20 mg  9/3/14 chol 169,trig 228,hdl 49,ldl 74 on zocor 20 mg  6/30/15 chol 151,trig 88,hdl 63,ldl 70 on zocor 20 mg  8/31/16 chol 169,trig 130,hdl 59,ldl 84 on zocor 20 mg  7/11/17 chol 147,trig 146,hdl 51,ldl 67 on zocor 20 mg  11/8/18 chol 189,trig 215,hdl 54,ldl 92 on zocor 20 mg  12/6/19 chol 163,trig 214,hdl 54,ldl 66 on zocor 20 mg  8/24/21  "chol 166,trig 142,hdl 57,ldl 81 on zocor 20 mg  6/22/22 chol 149,trig 139,hdl 48,ldl 73 on zocor 20 mg     Depression  3/12 tried cymalta for pain no benefit  12/6/13 PHQ 9 score 15, start cymbalta samples 30 mg x 7 days, then 60 mg  12/20/13 cymbalta 60 mg  1/22/15 off cymbalta    5/21/15 on cymbalta 60 mg  2/19/16 referral to behavioral health recommended he declines  11/29/16 depression screening score 0, continues on cymbalta  1/30/18 depression screening score 0 on cymbalta  8/2/21 on cymbalta screening 0 score   1/4/22 on cymbalta screening 0/3 score     Chronic opiate  1/4/22 controlled substance contract  3/30/22   3/30/22 UDT   6/27/22   9/26/22   Has tried hydrocodone, NSAIDs, pregabalin, steroid injections, physical therapy, cymbalta       Patient Active Problem List   Diagnosis    S/P hip replacement    S/p lumbar surgery    Neck pain    Hypertension    Paroxysmal atrial fibrillation (HCC)    Dyslipidemia    History of squamous cell carcinoma    Preventative health care    S/P knee replacement    Obesity    S/p heel left surgery    History of stroke    Insomnia    Depression, major, in full remission ([facility])    Chronic pain    Knee arthritis    Opiate dependence, continuous (CMS-[facility])    S/P Maze operation for atrial fibrillation    Fibromyalgia    Impaired glucose metabolism                  Patient Care Team:  Norman Peterson M.D. as PCP - General  Norman Peterson M.D. as PCP - [facility] Paneled  Jose Martin Campbell M.D. as Consulting Physician (Anesthesiology)  Irineo Skinner M.D. as Consulting Physician (Internal Medicine Clinical Cardiac Electrophysiology)  Pete Rangel O.D. as Consulting Physician (Optometry)  Reji Thomas as [facility]       ROS           Objective     BP (!) 158/86 (BP Location: Right arm, Patient Position: Sitting, BP Cuff Size: Adult)   Pulse (!) 114   Temp 36.6 °C (97.9 °F)   Ht 1.702 m (5' 7\")   Wt 124 kg (273 lb)   SpO2 93%   BMI " 42.76 kg/m²      Physical Exam  Vitals and nursing note reviewed.   Constitutional:       Appearance: Normal appearance.   HENT:      Head: Normocephalic and atraumatic.      Right Ear: External ear normal.      Left Ear: External ear normal.   Eyes:      Conjunctiva/sclera: Conjunctivae normal.   Cardiovascular:      Rate and Rhythm: Normal rate and regular rhythm.      Heart sounds: Normal heart sounds.   Pulmonary:      Effort: Pulmonary effort is normal.      Breath sounds: Normal breath sounds.   Abdominal:      General: There is no distension.   Musculoskeletal:         General: No swelling.      Cervical back: Neck supple.   Skin:     Findings: No bruising.   Neurological:      General: No focal deficit present.      Mental Status: He is alert.   Psychiatric:         Mood and Affect: Mood normal.         Behavior: Behavior normal.                           Assessment & Plan        Assessment  #1 chronic low back pain status post lumbar surgery 2012, has seen neurosurgery, pain management, physical therapy, has had imaging, remains on Percocet 10 mg quantity 120 for 30 days    #2 chronic opiate therapy Percocet 10 mg 120 tablets per 30 days morphine equivalent 60, saw pain management for a number of years, wanted to change back to myself, as she was not satisfied with pain management, and I have been prescribing the Percocet for him for the past year with no change in quantity or dosing, he has been compliant with taking the medications, has signed a pain contract is up-to-date on his urine drug screen testing, the medication does not cause drowsiness, sedation, memory loss, depression or mood changes, no alcohol with the medication, and the medication is effective for his chronic back pain and knee pain, having seen neurosurgery, pain management, physical therapy    #3 chronic right knee pain secondary to arthritis    #4 hypertension stable blood pressures at home on losartan and Dyazide, amlodipine, blood  pressure elevated today but remains normal at home    #5 paroxysmal atrial fibrillation status post Maze procedure followed by cardiology currently asymptomatic, on aspirin    #6 BMI 40.9    #7 chronic insomnia on Ambien with no daytime drowsiness or hangover effect      #8 depression full remission on Cymbalta more for chronic pain rather than depression    Plan  #!     #2 refill Percocet 10 mg 4 times daily quantity 120 for 30 days at [facility] prescription for the next 3 months sent to his pharmacy    #3 as indicated above morphine equivalent 60 and he had been seen by pain management until last year when he wanted to see myself and have me prescribe his pain medication as I had previously, understanding that morphine equivalent greater than 50 puts him at high risk for complications, his Percocet use has decreased since 2015 and his current dosing regimen has been stable as monitored by pain management previously and we have an agreement that I will continue to provide the Percocet at his current dosing as long as he had no side effects and did not increase the quantity or frequency.  He continues to have no drowsiness, sedation, memory loss, depression or side effects from the Percocet.   and urine drug screening have been consistent.  Understanding long-term use may contribute to habituation, dependence I believe the benefits outweigh the risks of his current medication dosing    #4 work on limiting sweets, candies, processed food, work on weight loss    #5 follow-up cardiology    #6 recommend he get the updated COVID-vaccine at the pharmacy    #7 pain contract     #8 understands that Ambien may contribute long-term habituation, dependence, memory loss, he has tried to work on taper previously    #9 follow-up with myself 3 months    #10 continue to check blood pressure regularly, since blood pressures are stable at home although today's reading is elevated, since his blood pressures are  consistently normal at home, continue same current medication regimen working on weight loss

## 2023-01-09 ENCOUNTER — TELEPHONE (OUTPATIENT)
Dept: MEDICAL GROUP | Facility: MEDICAL CENTER | Age: 78
End: 2023-01-09
Payer: MEDICARE

## 2023-01-09 DIAGNOSIS — M17.11 ARTHRITIS OF KNEE, RIGHT: Chronic | ICD-10-CM

## 2023-01-09 DIAGNOSIS — M54.9 BACK PAIN, UNSPECIFIED BACK LOCATION, UNSPECIFIED BACK PAIN LATERALITY, UNSPECIFIED CHRONICITY: ICD-10-CM

## 2023-01-09 RX ORDER — OXYCODONE AND ACETAMINOPHEN 10; 325 MG/1; MG/1
1 TABLET ORAL EVERY 6 HOURS PRN
Qty: 120 TABLET | Refills: 0 | Status: SHIPPED | OUTPATIENT
Start: 2023-02-09 | End: 2023-03-11

## 2023-01-09 RX ORDER — OXYCODONE AND ACETAMINOPHEN 10; 325 MG/1; MG/1
1 TABLET ORAL EVERY 6 HOURS PRN
Qty: 120 TABLET | Refills: 0 | Status: SHIPPED | OUTPATIENT
Start: 2023-01-10 | End: 2023-02-09

## 2023-01-09 RX ORDER — OXYCODONE AND ACETAMINOPHEN 10; 325 MG/1; MG/1
1 TABLET ORAL EVERY 6 HOURS PRN
Qty: 120 TABLET | Refills: 0 | Status: SHIPPED | OUTPATIENT
Start: 2023-03-11 | End: 2023-03-23 | Stop reason: SDUPTHER

## 2023-01-09 NOTE — TELEPHONE ENCOUNTER
Noted, I do not see anything in the PDMP indicating that he received 3 days worth of medications. Could you please call the [facility] pharmacy to verify that the patient was given 3 days of the Percocet.     I will send the Percocet refill for [facility] tomorrow but I am not going to call the pharmacy directly to make sure that they can refill that.  The patient will have to do that himself.

## 2023-01-09 NOTE — TELEPHONE ENCOUNTER
I do now see the message in [employer] but I was never forwarded that [employer] message.  I will send the Percocet refill for tomorrow to his pharmacy.

## 2023-01-09 NOTE — TELEPHONE ENCOUNTER
Juliana called to advise that when Julio went to  his Percocet that his pharmacy ( [facility])was out of stock, The pharmacy ([facility])  gave him 3 days worth and then filled for the remainder amount later in the week. Now she says that the other 2 Rx's need to be moved back 3 days so that he can  sooner. Pt will run out of medication on 1/10/23. Please call and make sure that [facility] [address] [facility].

## 2023-03-23 ENCOUNTER — OFFICE VISIT (OUTPATIENT)
Dept: MEDICAL GROUP | Facility: MEDICAL CENTER | Age: 78
End: 2023-03-23
Payer: MEDICARE

## 2023-03-23 VITALS
BODY MASS INDEX: 41.22 KG/M2 | HEIGHT: 68 IN | WEIGHT: 272 LBS | DIASTOLIC BLOOD PRESSURE: 78 MMHG | RESPIRATION RATE: 20 BRPM | SYSTOLIC BLOOD PRESSURE: 132 MMHG | HEART RATE: 105 BPM | OXYGEN SATURATION: 94 % | TEMPERATURE: 98 F

## 2023-03-23 DIAGNOSIS — E66.01 MORBID (SEVERE) OBESITY DUE TO EXCESS CALORIES (HCC): ICD-10-CM

## 2023-03-23 DIAGNOSIS — Z12.5 PROSTATE CANCER SCREENING: ICD-10-CM

## 2023-03-23 DIAGNOSIS — M54.9 BACK PAIN, UNSPECIFIED BACK LOCATION, UNSPECIFIED BACK PAIN LATERALITY, UNSPECIFIED CHRONICITY: ICD-10-CM

## 2023-03-23 DIAGNOSIS — E78.5 DYSLIPIDEMIA: ICD-10-CM

## 2023-03-23 DIAGNOSIS — E55.9 VITAMIN D DEFICIENCY: ICD-10-CM

## 2023-03-23 DIAGNOSIS — Z00.00 PREVENTATIVE HEALTH CARE: Chronic | ICD-10-CM

## 2023-03-23 DIAGNOSIS — I48.0 PAROXYSMAL ATRIAL FIBRILLATION (HCC): ICD-10-CM

## 2023-03-23 DIAGNOSIS — R73.09 IMPAIRED GLUCOSE METABOLISM: ICD-10-CM

## 2023-03-23 DIAGNOSIS — F33.41 RECURRENT MAJOR DEPRESSIVE DISORDER, IN PARTIAL REMISSION (HCC): ICD-10-CM

## 2023-03-23 DIAGNOSIS — G89.29 OTHER CHRONIC PAIN: ICD-10-CM

## 2023-03-23 DIAGNOSIS — M17.11 ARTHRITIS OF KNEE, RIGHT: Chronic | ICD-10-CM

## 2023-03-23 PROCEDURE — 99214 OFFICE O/P EST MOD 30 MIN: CPT | Performed by: INTERNAL MEDICINE

## 2023-03-23 RX ORDER — OXYCODONE AND ACETAMINOPHEN 10; 325 MG/1; MG/1
1 TABLET ORAL EVERY 6 HOURS PRN
Qty: 120 TABLET | Refills: 0 | Status: SHIPPED | OUTPATIENT
Start: 2023-04-10 | End: 2023-05-10

## 2023-03-23 RX ORDER — OXYCODONE AND ACETAMINOPHEN 10; 325 MG/1; MG/1
1 TABLET ORAL EVERY 6 HOURS PRN
Qty: 120 TABLET | Refills: 0 | Status: SHIPPED | OUTPATIENT
Start: 2023-06-09 | End: 2023-07-09

## 2023-03-23 RX ORDER — OXYCODONE AND ACETAMINOPHEN 10; 325 MG/1; MG/1
1 TABLET ORAL EVERY 6 HOURS PRN
Qty: 120 TABLET | Refills: 0 | Status: SHIPPED | OUTPATIENT
Start: 2023-05-10 | End: 2023-06-22 | Stop reason: SDUPTHER

## 2023-03-23 ASSESSMENT — PATIENT HEALTH QUESTIONNAIRE - PHQ9
4. FEELING TIRED OR HAVING LITTLE ENERGY: NOT AT ALL
6. FEELING BAD ABOUT YOURSELF - OR THAT YOU ARE A FAILURE OR HAVE LET YOURSELF OR YOUR FAMILY DOWN: NOT AL ALL
SUM OF ALL RESPONSES TO PHQ QUESTIONS 1-9: 5
9. THOUGHTS THAT YOU WOULD BE BETTER OFF DEAD, OR OF HURTING YOURSELF: NOT AT ALL
2. FEELING DOWN, DEPRESSED, IRRITABLE, OR HOPELESS: NOT AT ALL
3. TROUBLE FALLING OR STAYING ASLEEP OR SLEEPING TOO MUCH: MORE THAN HALF THE DAYS
7. TROUBLE CONCENTRATING ON THINGS, SUCH AS READING THE NEWSPAPER OR WATCHING TELEVISION: SEVERAL DAYS
5. POOR APPETITE OR OVEREATING: MORE THAN HALF THE DAYS
8. MOVING OR SPEAKING SO SLOWLY THAT OTHER PEOPLE COULD HAVE NOTICED. OR THE OPPOSITE, BEING SO FIGETY OR RESTLESS THAT YOU HAVE BEEN MOVING AROUND A LOT MORE THAN USUAL: NOT AT ALL
1. LITTLE INTEREST OR PLEASURE IN DOING THINGS: NOT AT ALL
SUM OF ALL RESPONSES TO PHQ9 QUESTIONS 1 AND 2: 0

## 2023-03-23 ASSESSMENT — FIBROSIS 4 INDEX: FIB4 SCORE: 1.68

## 2023-03-23 NOTE — PROGRESS NOTES
Subjective     Julio Gonzalez is a 77 y.o. male who presents with Follow-Up (Med mgmt)            HPI  Patient here with his wife, here for medication refill.  Currently taking Percocet 10 mg 4 times daily quantity 120 per 30 days, has had chronic low back pain status post lumbar surgery 2012, chronic knee pain secondary arthritis has seen orthopedics, chronic pain is stable and controlled on Percocet, morphine equivalent 60 has seen pain management previously with Dr. Mathias, and was able to taper down the quantity from 150 per 30 days down to the current 120 per 30 days, patient not able to taper down further because of flareup of back pain.  Previously has tried hydrocodone, NSAIDs, pregabalin, physical therapy, steroid injections, also maintains on Cymbalta.  Chronic Percocet therapy does not cause drowsiness, sedation, memory loss, mood changes, constipation.    Paroxysmal atrial fibrillation no chest pain, palpitations, lightheadedness.  No additional caffeine intake.  Blood pressure runs 130/80 followed by cardiology, currently on losartan, Dyazide, amlodipine.  Patient tries to limit sweets and candies, does eat more microwave dinners but tries to take in lower sodium content dinners.  No regular exercise.  Dyslipidemia on Zocor 20 mg daily no muscle pain previously with statin therapy.  History of squamous cell skin cancer has not seen dermatology recently.  No sun exposure.        Current Outpatient Medications   Medication Sig Dispense Refill    naproxen (NAPROSYN) 500 MG Tab TAKE ONE TABLET BY MOUTH TWICE A DAY AS NEEDED FOR PAIN -GENERIC FOR NAPROSYN 180 Tablet 3    zolpidem (AMBIEN) 10 MG Tab TAKE ONE TABLET BY MOUTH AT BEDTIME AS NEEDED FOR SLEEP FOR UP TO 90 DAYS 90 Tablet 0    oxyCODONE-acetaminophen (PERCOCET-10)  MG Tab Take 1 Tablet by mouth every 6 hours as needed for Severe Pain for up to 30 days. 120 Tablet 0    DULoxetine (CYMBALTA) 60 MG Cap DR Particles delayed-release  capsule TAKE ONE CAPSULE BY MOUTH DAILY 90 Capsule 3    losartan (COZAAR) 100 MG Tab TAKE ONE TABLET BY MOUTH DAILY 100 Tablet 2    amLODIPine (NORVASC) 10 MG Tab TAKE ONE TABLET BY MOUTH DAILY 90 Tablet 2    potassium chloride SA (KLOR-CON M20) 20 MEQ Tab CR TAKE 2 TABLETS BY MOUTH EVERY MORNING, 1 TABLET AT NOON, 2 TABLETS EVERY EVENING. CHANGING THE DOSING PER DAY FROM 2 TABLETS TWICE A DAY, ADDING EXTRA DOSE AT MIDDAY. 450 Tablet 3    simvastatin (ZOCOR) 20 MG Tab TAKE ONE TABLET BY MOUTH EVERY EVENING 100 Tablet 2    triamterene-hctz (MAXZIDE-25/DYAZIDE) 37.5-25 MG Tab Take 2 Tablets by mouth every day. 180 Tablet 3    loratadine (CLARITIN) 10 MG Tab       Multiple Vitamins-Minerals (MULTI COMPLETE PO) Take  by mouth.      aspirin (ASA) 81 MG Chew Tab chewable tablet Take 1 Tab by mouth every day. 100 Tab 11    polyethylene glycol/lytes (MIRALAX) PACK Take 17 g by mouth every day.      docusate sodium (COLACE) 100 MG CAPS Take 100 mg by mouth every day.      vitamin D (CHOLECALCIFEROL) 1000 UNIT TABS Take 6,000 Units by mouth every day.       No current facility-administered medications for this visit.              Status post lumbar surgery  2/01 MRI lumbar spine DJD L5-S1 anterolisthesis 4-5 mm marked stenosis saw  neurosurgery  9/09  neurosurgery note  12/09 neurosurgery note, severe left-sided foraminal stenosis, L5-S1 spondylolisthesis 4-5 mm recommend surgery, patient declined, has had epidural with no benefit,tried lyrica and neurontin before, declines cymbalta trial  4/10 note dr. johnson neurosurgery who gives patient norco, he is retiring, and has offered patient surgical therapy versus continuing norco 10 mg which we'll assume dispensing.  10/11 MRI lumbar spine grade 1 spondylolisthesis 9mm, bilateral spondylosis L5, moderate bilateral L5 stenosis, 2.4 cm left kidney cyst  10/11 xray LS, grade 1 spondylolisthesis L5 on S1 increased from prior exam, 1.3 cm, no significant change in  flexion  11/11  pain note right L5-S1 transforaminal epidural  1/16/12 start cymbalta trial (3/12 stop cymbalta no benefit)  1/12 dr. murphy neurosurgery note surgical intervention offered L4 to sacrum decompression  3/26/12 restarted cymbalta    9/26/12  neurosurgery operative note decompression at L5-S1and bilateral foraminotomies,transforaminal lumbar interbody fusion, L4-L5 and L5-S1, with expandable cage 8-12 mm.  2/20/13 dr. murphy neurosurgery note, lumbar films, EMG NCS lower ext inconclusive, repeat MRI lumbar spine pending  3/13/13 MRI lumbar spine postoperative changes L4-S1 lumbar laminectomy, interbody fusion, disc space insert L4-L5 L5-S1, posterior spinal fusion and fixation, L5-S1 moderate right foraminal stenosis. 2.4 cm mid right renal cyst unchanged  3/26/13 dr. murphy neurosurgery note, no further surgical intervention necessary, chronic narcotics norco 10 mg 5 per day; I will assume the norco rx from   1/25/15 off naprosyn and cymbalta; on norco 10 mg one every four hours #150  5/21/15 on percocet 10 mg five per day and on cymbalta 60 mg  3/25/16 start lyrica 50 mg tid for fibromyalgia, continue percocet 10 mg 5 per day and cymbalta 60 mg  4/12/16 increase lyrica to 100 mg tid (50 mg two tid), continue cymbalta 60 mg and percocet 10 mg 5 per day  9/26/16 off lyrica, on percocet 10 mg qid per  pain management and cymbalta  7/9/19 on percocet 10 mg #140 per 28 days from  pain management also on cymbalta 60 mg and naprosyn 500 mg bid  10/7/20  pain note on oxycodone #140 per 28 days will decrease to #112 per 28 days  11/4/20  pain note on oxycodone #112 per 28 days   10/7/21 I am taking over percocet refill, 4 per day #120 per 30 days  12/13/21 percocet 10 mg #120     Status post knee replacement left 2001     Status post hip replacement bilateral  12/03 right total hip replacement  1/04 left total hip replacement      s/p  heel surgery  12/09  left foot heel spur surgery     Preventative health  4/6/07 colonoscopy [facility] negative  8/27/15 prevnar at [facility]  12/12/19 tdap  12/12/19 pneumovax  8/2/21 declines colon  8/13/21 FIT negative  8/24/21 psa 2.2  8/24/21 hep c ab negative  10/7/21 shingrix second  10/14/21covid pfizer booster  6/22/22 vit d 85     Paroxysmal atrial fibrillation  Sees [facility]  2005 s/p maze procedure with a stroke related to that as a complication, have no records at all regarding that, no CT scan or MRI scan and probably had a cardiac catheterization by renal physicians in 2005 that was negative for coronary artery disease  8/11 [facility] note EKG NSR  5/12 [facility] note, on asa daily  11/12 [facility] note  4/29/13 cardiology note  11/3/13 cardiology note on asa  9/22/14 cardiology note sinus, follow up 6 months  5/12/15  cardiology note, paroxysmal atrial fibrillation status post maze operation, sinus rhythm  11/18/15 cardiology note stable follow one year  4/4/17  cardiology note, paroxysmal atrial fibrillation no recurrence, follow-up one year  2/5/19 cardiology note paroxysmal atrial fibrillation, sinus on exam, no recurrence of atrial fibrillation status post maze, follow-up 1 year  6/16/20 cardiology note atrial fibrillation status post maze no recurrence, hypertension stable on current regimen, work on weight loss, follow-up 6 months  7/13/21  cardiology note  1/4/22 declines overnight pulse oximetry  7/6/22 cardiology no changes      neck pain  3/06 MRI cervical spine moderate subdural frontal stenosis C5-C6 lesser extent C6-C7     knee arthritis   9/3/14 x-ray right knee marked tricompartmental osteoarthritis, most severe medial compartment  2/19/16 has seen  orthopedics, declines knee replacement       Insomnia  2009 on ambien 10 mg  2/19/16 unable to taper ambien, referral to behavioral sleep psychology recommended he declines  9/26/16 on ambien 10 mg work on taper, declines  referral to sleep psychology  7/9/18 on ambien declines sleep management referral  7/20/20 taper ambien if possible     Impaired glucose metabolism  11/8/18 A1c 6%  12/6/19 A1c 5.8%  8/24/21 A1c 5.5%      Hypertension  7/10 [facility] note change from hyzaar to enalapril hct 10/25  1/11 [facility] note bp not controlled on enalapril hct 10/25, change back to losartan hct 100/25 and norvasc 10 mg  6/12 K+ 3.0, increase kdur to 20 tid, consider decreasing hctz if possible to decrease K+ supplementation  12/12 cozaar 100 mg, dyazide 37.5/25 mg, kcl 20 meq, norvasc 10 mg  1/29/14 on cozaar 100 mg, dyazide 37.5/25 mg, norvasc 10 mg, klor 20 meq bid; will increase to klor 20 meq tid  9/3//14 K+ 3.1 on klor 20 meq tid, increase to 20 meq two tab bid  9/3/14 urine mac <0.5 on cozaar 100 mg, dyazide 37.5/25 mg, norvasc 10 mg, klor 20 meq two tab bid  6/30/15 Na 132,K+ 3.5 on cozaar 100 mg, dyazide 37.5/25 mg, norvasc 10 mg, klor 20 meq two tab bid  8/31/16 K+ 3.3 on cozaar 100 mg, dyazide 37.5/25 mg, norvasc 10 mg, klor 20 meq two tab qday will increase to 2 bid  7/11/17 K+3.3 on cozaar 100 mg, dyazide 37.5/25 mg, norvasc 10 mg, klor 20 meq two bid  2/5/19 cardiology note paroxysmal atrial fibrillation, sinus on exam, no recurrence of atrial fibrillation status post maze, follow-up 1 year  12/6/19 K+3.5 on cozaar 100 mg, dyazide 37.5/25 mg, norvasc 10 mg, klor 20 meq two bid  6/16/20 cardiology note atrial fibrillation status post maze no recurrence, hypertension stable on current regimen, work on weight loss, follow-up 6 months  7/13/21  cardiology note on cozaar 100 mg, dyazide 37.5/25 mg, norvasc 10 mg, klor 20 meq two bid  8/24/21 K+ 3.4 on klor 20 meq two bid recommend increase to 2 am, 1 noon, 2 pm  6/22/22 K+ 3.4 on klor 20 meq 2 am, 1 noon, 2 pm on cozaar 100 mg, dyazide 37.5/25 mg, norvasc 10 mg   7/6/22 cardiology note no changes      History stroke  1999 affected right arm, no old records  Some difficulty with short  recall, and occasional diff with expressing self when fatigued     History squamous cell carcinoma  Saw  dermatology offered aldara he declined  3/27/17  dermatology note  5/1/17  dermatology biopsy proven squamous cell carcinoma in situ left forehead, here for mohs  9/19/17  dermatology note continue      Fibromyalgia  3/25/16 start lyrica 50 mg tid for fibromyalgia, continue percocet 10 mg 5 per day and cymbalta 60 mg  4/12/16 increase lyrica to 100 mg tid (50 mg two tid), continue cymbalta 60 mg and percocet 10 mg 5 per day  4/28/16 increase lyrica to 150 mg tid, continue cymbalta 60 mg and percocet 10 mg five times per day  5/10/16 on lyrica 50 mg three tabs tid, change to 100 mg am, 150 mg noon, 100 mg pm     Dyslipidemia  Followed by cardiology  2/09 cholesterol 135, triglycerides 100, HDL 47, LDL 68  8/09 chol 138,trig 101,hdl 49,ldl 69  3/10 chol 123,trig 66,hdl 49,ldl 61  7/10 [facility] note change vytorin to zocor 40  11/10 chol 141,trig 75,hdl 62,ldl 64 on vytorin 10/20 mg per [facility]  10/11 chol 159,trig 102,hdl 52,ldl 87 on vytorin 10/20 per [facility]  2/29/12 stop vytorin, change to zocor 20 mg per [facility]  6/12 chol 152,trig 102,hdl 49,ldl 83 on zocor 20 mg  6/13 chol 150,trig 130,hdl 52,ldl 72 on zocor 20 mg  1/29/14 chol 147,trig 127,hdl 50,ldl 72 on zocor 20 mg  9/3/14 chol 169,trig 228,hdl 49,ldl 74 on zocor 20 mg  6/30/15 chol 151,trig 88,hdl 63,ldl 70 on zocor 20 mg  8/31/16 chol 169,trig 130,hdl 59,ldl 84 on zocor 20 mg  7/11/17 chol 147,trig 146,hdl 51,ldl 67 on zocor 20 mg  11/8/18 chol 189,trig 215,hdl 54,ldl 92 on zocor 20 mg  12/6/19 chol 163,trig 214,hdl 54,ldl 66 on zocor 20 mg  8/24/21 chol 166,trig 142,hdl 57,ldl 81 on zocor 20 mg  6/22/22 chol 149,trig 139,hdl 48,ldl 73 on zocor 20 mg     Depression  3/12 tried cymalta for pain no benefit  12/6/13 PHQ 9 score 15, start cymbalta samples 30 mg x 7 days, then 60 mg  12/20/13 cymbalta 60 mg  1/22/15 off cymbalta     5/21/15 on cymbalta 60 mg  2/19/16 referral to behavioral health recommended he declines  11/29/16 depression screening score 0, continues on cymbalta  1/30/18 depression screening score 0 on cymbalta  8/2/21 on cymbalta screening 0 score   1/4/22 on cymbalta screening 0/3 score     Chronic opiate  3/30/22 UDT   12/29/22 morphine equivalent 60 but has been stable and controlled  12/29/22 pain contract  Has tried hydrocodone, NSAIDs, pregabalin, steroid injections, physical therapy, cymbalta       Patient Active Problem List   Diagnosis    S/P hip replacement    S/p lumbar surgery    Neck pain    Hypertension    Paroxysmal atrial fibrillation (HCC)    Dyslipidemia    History of squamous cell carcinoma    Preventative health care    S/P knee replacement    Obesity    S/p heel left surgery    History of stroke    Insomnia    Depression, major, in full remission (HCC)    Chronic pain    Knee arthritis    Opiate dependence, continuous (CMS-HCC)    S/P Maze operation for atrial fibrillation    Fibromyalgia    Impaired glucose metabolism                Patient Care Team:  Norman Peterson M.D. as PCP - General  Normandary Peterson M.D. as PCP - [facility] Paneled  Jose Martin Campbell M.D. as Consulting Physician (Anesthesiology)  Irineo Skinner M.D. as Consulting Physician (Internal Medicine Clinical Cardiac Electrophysiology)  Pete Rangel O.D. as Consulting Physician (Optometry)  Reji Thomas as [facility]       ROS           Objective          Physical Exam  Vitals and nursing note reviewed.   Constitutional:       Appearance: Normal appearance.   HENT:      Head: Normocephalic and atraumatic.      Right Ear: External ear normal.      Left Ear: External ear normal.   Eyes:      Conjunctiva/sclera: Conjunctivae normal.   Cardiovascular:      Rate and Rhythm: Normal rate and regular rhythm.      Heart sounds: Normal heart sounds.   Pulmonary:      Effort: Pulmonary effort is normal.      Breath  sounds: Normal breath sounds.   Abdominal:      General: There is no distension.   Musculoskeletal:         General: No swelling.   Skin:     Findings: No bruising.   Neurological:      General: No focal deficit present.      Mental Status: He is alert.   Psychiatric:         Mood and Affect: Mood normal.         Behavior: Behavior normal.                           Assessment & Plan        Assessment  #1  Chronic back pain status post lumbar surgery, currently on Percocet 10 mg 4 times daily quantity 120 for 30 days, medication is effective in limiting his back pain allowing him to perform ADLs    #2 chronic opiate therapy Percocet 10 mg 4 times daily, no drowsiness, sedation, memory loss, depression, no side effects with the medication    #3 hypertension followed by cardiology stable on losartan, Dyazide, Norvasc, tries to follow a low-sodium diet    #4 dyslipidemia on Zocor     #5 Paroxysmal atrial fibrillation sinus on exam no symptoms, followed by cardiology, asymptomatic    #6 major depression full remission on Cymbalta    #7 BMI 41.36 morbid obesity    #8 vitamin deficiency    #9 impaired glucose metabolism, limiting sweets       Plan   #! UDT patient provided specimen but quantity not sufficient    #2 cardiology follow-up    #3 follow-up dermatology    #4 declines prevnar     #5     #6 refill Percocet 3 separate prescriptions sent to his pharmacy for the next 3 months quantity 120 per 30 days, medication is effective without side effects, has seen pain management previously and maintains on his current regimen that he was on with pain management.  I believe the benefits outweigh the risks of chronic opiate therapy    #7 Labs    #8 follow-up 3 months    #9 continue losartan, Dyazide, amlodipine, check blood pressure regularly and record    #10 previously have encouraged the patient to try to keep active with exercise, he will continue his low-sodium diet, limiting processed foods if possible    #11 continue  Cymbalta

## 2023-04-04 DIAGNOSIS — E78.5 DYSLIPIDEMIA: ICD-10-CM

## 2023-04-05 RX ORDER — SIMVASTATIN 20 MG
TABLET ORAL
Qty: 100 TABLET | Refills: 0 | Status: SHIPPED | OUTPATIENT
Start: 2023-04-05 | End: 2023-07-06

## 2023-04-05 NOTE — TELEPHONE ENCOUNTER
Is the patient due for a refill? Yes    Was the patient seen the past year? Yes    Date of last office visit: 7/6/2022    Does the patient have an upcoming appointment?  Yes   If yes, When? 7/31/2023    Provider to refill:[email or web address]    Does the patients insurance require a 100 day supply?  Yes

## 2023-05-16 DIAGNOSIS — I10 ESSENTIAL HYPERTENSION: ICD-10-CM

## 2023-05-16 DIAGNOSIS — I48.0 PAF (PAROXYSMAL ATRIAL FIBRILLATION) (HCC): ICD-10-CM

## 2023-05-17 RX ORDER — TRIAMTERENE AND HYDROCHLOROTHIAZIDE 37.5; 25 MG/1; MG/1
CAPSULE ORAL
COMMUNITY
Start: 2023-04-24 | End: 2023-06-22

## 2023-05-17 RX ORDER — AMLODIPINE BESYLATE 10 MG/1
TABLET ORAL
Qty: 90 TABLET | Refills: 0 | Status: SHIPPED | OUTPATIENT
Start: 2023-05-17 | End: 2023-08-08

## 2023-05-17 NOTE — TELEPHONE ENCOUNTER
Received request via: Pharmacy    Was the patient seen in the last year in this department? Yes 7/6/22    Does the patient have an active prescription (recently filled or refills available) for medication(s) requested? No    Does the patient have [facility] and need 100 day supply (blood pressure, diabetes and cholesterol meds only)? Yes, quantity updated to 100 days

## 2023-05-25 ENCOUNTER — TELEPHONE (OUTPATIENT)
Dept: HEALTH INFORMATION MANAGEMENT | Facility: OTHER | Age: 78
End: 2023-05-25
Payer: MEDICARE

## 2023-06-19 ENCOUNTER — HOSPITAL ENCOUNTER (OUTPATIENT)
Dept: LAB | Facility: MEDICAL CENTER | Age: 78
End: 2023-06-19
Attending: INTERNAL MEDICINE
Payer: MEDICARE

## 2023-06-19 DIAGNOSIS — Z12.5 PROSTATE CANCER SCREENING: ICD-10-CM

## 2023-06-19 DIAGNOSIS — E55.9 VITAMIN D DEFICIENCY: ICD-10-CM

## 2023-06-19 DIAGNOSIS — E78.5 DYSLIPIDEMIA: ICD-10-CM

## 2023-06-19 DIAGNOSIS — R73.09 IMPAIRED GLUCOSE METABOLISM: ICD-10-CM

## 2023-06-19 LAB
25(OH)D3 SERPL-MCNC: 90 NG/ML (ref 30–100)
ALBUMIN SERPL BCP-MCNC: 4.5 G/DL (ref 3.2–4.9)
ALBUMIN/GLOB SERPL: 1.5 G/DL
ALP SERPL-CCNC: 75 U/L (ref 30–99)
ALT SERPL-CCNC: 12 U/L (ref 2–50)
ANION GAP SERPL CALC-SCNC: 14 MMOL/L (ref 7–16)
AST SERPL-CCNC: 18 U/L (ref 12–45)
BASOPHILS # BLD AUTO: 0.6 % (ref 0–1.8)
BASOPHILS # BLD: 0.05 K/UL (ref 0–0.12)
BILIRUB SERPL-MCNC: 0.6 MG/DL (ref 0.1–1.5)
BUN SERPL-MCNC: 22 MG/DL (ref 8–22)
CALCIUM ALBUM COR SERPL-MCNC: 10.1 MG/DL (ref 8.5–10.5)
CALCIUM SERPL-MCNC: 10.5 MG/DL (ref 8.4–10.2)
CHLORIDE SERPL-SCNC: 101 MMOL/L (ref 96–112)
CHOLEST SERPL-MCNC: 153 MG/DL (ref 100–199)
CO2 SERPL-SCNC: 25 MMOL/L (ref 20–33)
CREAT SERPL-MCNC: 0.88 MG/DL (ref 0.5–1.4)
EOSINOPHIL # BLD AUTO: 0.12 K/UL (ref 0–0.51)
EOSINOPHIL NFR BLD: 1.4 % (ref 0–6.9)
ERYTHROCYTE [DISTWIDTH] IN BLOOD BY AUTOMATED COUNT: 43.2 FL (ref 35.9–50)
EST. AVERAGE GLUCOSE BLD GHB EST-MCNC: 114 MG/DL
FASTING STATUS PATIENT QL REPORTED: NORMAL
GFR SERPLBLD CREATININE-BSD FMLA CKD-EPI: 89 ML/MIN/1.73 M 2
GLOBULIN SER CALC-MCNC: 3 G/DL (ref 1.9–3.5)
GLUCOSE SERPL-MCNC: 114 MG/DL (ref 65–99)
HBA1C MFR BLD: 5.6 % (ref 4–5.6)
HCT VFR BLD AUTO: 48 % (ref 42–52)
HDLC SERPL-MCNC: 51 MG/DL
HGB BLD-MCNC: 17 G/DL (ref 14–18)
IMM GRANULOCYTES # BLD AUTO: 0.03 K/UL (ref 0–0.11)
IMM GRANULOCYTES NFR BLD AUTO: 0.3 % (ref 0–0.9)
LDLC SERPL CALC-MCNC: 70 MG/DL
LYMPHOCYTES # BLD AUTO: 2.47 K/UL (ref 1–4.8)
LYMPHOCYTES NFR BLD: 28.2 % (ref 22–41)
MCH RBC QN AUTO: 33.1 PG (ref 27–33)
MCHC RBC AUTO-ENTMCNC: 35.4 G/DL (ref 32.3–36.5)
MCV RBC AUTO: 93.6 FL (ref 81.4–97.8)
MONOCYTES # BLD AUTO: 0.58 K/UL (ref 0–0.85)
MONOCYTES NFR BLD AUTO: 6.6 % (ref 0–13.4)
NEUTROPHILS # BLD AUTO: 5.52 K/UL (ref 1.82–7.42)
NEUTROPHILS NFR BLD: 62.9 % (ref 44–72)
NRBC # BLD AUTO: 0 K/UL
NRBC BLD-RTO: 0 /100 WBC (ref 0–0.2)
PLATELET # BLD AUTO: 260 K/UL (ref 164–446)
PMV BLD AUTO: 9 FL (ref 9–12.9)
POTASSIUM SERPL-SCNC: 3.8 MMOL/L (ref 3.6–5.5)
PROT SERPL-MCNC: 7.5 G/DL (ref 6–8.2)
PSA SERPL-MCNC: 3.37 NG/ML (ref 0–4)
RBC # BLD AUTO: 5.13 M/UL (ref 4.7–6.1)
SODIUM SERPL-SCNC: 140 MMOL/L (ref 135–145)
TRIGL SERPL-MCNC: 160 MG/DL (ref 0–149)
TSH SERPL DL<=0.005 MIU/L-ACNC: 1.93 UIU/ML (ref 0.38–5.33)
WBC # BLD AUTO: 8.8 K/UL (ref 4.8–10.8)

## 2023-06-19 PROCEDURE — 80061 LIPID PANEL: CPT

## 2023-06-19 PROCEDURE — 84443 ASSAY THYROID STIM HORMONE: CPT

## 2023-06-19 PROCEDURE — 83036 HEMOGLOBIN GLYCOSYLATED A1C: CPT

## 2023-06-19 PROCEDURE — 82306 VITAMIN D 25 HYDROXY: CPT

## 2023-06-19 PROCEDURE — 36415 COLL VENOUS BLD VENIPUNCTURE: CPT

## 2023-06-19 PROCEDURE — 85025 COMPLETE CBC W/AUTO DIFF WBC: CPT

## 2023-06-19 PROCEDURE — 84153 ASSAY OF PSA TOTAL: CPT

## 2023-06-19 PROCEDURE — 80053 COMPREHEN METABOLIC PANEL: CPT

## 2023-06-22 ENCOUNTER — OFFICE VISIT (OUTPATIENT)
Dept: MEDICAL GROUP | Facility: MEDICAL CENTER | Age: 78
End: 2023-06-22
Payer: MEDICARE

## 2023-06-22 ENCOUNTER — HOSPITAL ENCOUNTER (OUTPATIENT)
Facility: MEDICAL CENTER | Age: 78
End: 2023-06-22
Attending: INTERNAL MEDICINE
Payer: MEDICARE

## 2023-06-22 VITALS
OXYGEN SATURATION: 93 % | RESPIRATION RATE: 16 BRPM | WEIGHT: 267 LBS | DIASTOLIC BLOOD PRESSURE: 90 MMHG | TEMPERATURE: 97.8 F | HEART RATE: 109 BPM | SYSTOLIC BLOOD PRESSURE: 146 MMHG | BODY MASS INDEX: 38.22 KG/M2 | HEIGHT: 70 IN

## 2023-06-22 DIAGNOSIS — M54.9 BACK PAIN, UNSPECIFIED BACK LOCATION, UNSPECIFIED BACK PAIN LATERALITY, UNSPECIFIED CHRONICITY: ICD-10-CM

## 2023-06-22 DIAGNOSIS — M17.11 ARTHRITIS OF KNEE, RIGHT: Chronic | ICD-10-CM

## 2023-06-22 DIAGNOSIS — F11.20 OPIATE DEPENDENCE, CONTINUOUS (HCC): Chronic | ICD-10-CM

## 2023-06-22 DIAGNOSIS — G89.29 OTHER CHRONIC PAIN: ICD-10-CM

## 2023-06-22 DIAGNOSIS — E66.01 MORBID (SEVERE) OBESITY DUE TO EXCESS CALORIES (HCC): ICD-10-CM

## 2023-06-22 DIAGNOSIS — Z23 NEED FOR VACCINATION FOR PNEUMOCOCCUS: ICD-10-CM

## 2023-06-22 DIAGNOSIS — I10 PRIMARY HYPERTENSION: Chronic | ICD-10-CM

## 2023-06-22 DIAGNOSIS — I48.0 PAROXYSMAL ATRIAL FIBRILLATION (HCC): Chronic | ICD-10-CM

## 2023-06-22 PROCEDURE — G0481 DRUG TEST DEF 8-14 CLASSES: HCPCS

## 2023-06-22 PROCEDURE — 3080F DIAST BP >= 90 MM HG: CPT | Performed by: INTERNAL MEDICINE

## 2023-06-22 PROCEDURE — G0009 ADMIN PNEUMOCOCCAL VACCINE: HCPCS | Performed by: INTERNAL MEDICINE

## 2023-06-22 PROCEDURE — 99214 OFFICE O/P EST MOD 30 MIN: CPT | Mod: 25 | Performed by: INTERNAL MEDICINE

## 2023-06-22 PROCEDURE — 90677 PCV20 VACCINE IM: CPT | Performed by: INTERNAL MEDICINE

## 2023-06-22 PROCEDURE — 3077F SYST BP >= 140 MM HG: CPT | Performed by: INTERNAL MEDICINE

## 2023-06-22 RX ORDER — OXYCODONE AND ACETAMINOPHEN 10; 325 MG/1; MG/1
1 TABLET ORAL EVERY 6 HOURS PRN
Qty: 120 TABLET | Refills: 0 | Status: SHIPPED | OUTPATIENT
Start: 2023-08-08 | End: 2023-09-07

## 2023-06-22 RX ORDER — OXYCODONE AND ACETAMINOPHEN 10; 325 MG/1; MG/1
1 TABLET ORAL EVERY 6 HOURS PRN
Qty: 120 TABLET | Refills: 0 | Status: SHIPPED | OUTPATIENT
Start: 2023-07-09 | End: 2023-08-11 | Stop reason: SDUPTHER

## 2023-06-22 RX ORDER — OXYCODONE AND ACETAMINOPHEN 10; 325 MG/1; MG/1
1 TABLET ORAL EVERY 6 HOURS PRN
Qty: 120 TABLET | Refills: 0 | Status: SHIPPED | OUTPATIENT
Start: 2023-09-07 | End: 2023-09-22 | Stop reason: SDUPTHER

## 2023-06-22 ASSESSMENT — FIBROSIS 4 INDEX: FIB4 SCORE: 1.52

## 2023-06-22 NOTE — PROGRESS NOTES
Subjective     Julio Gonzalez is a 77 y.o. male who presents with med refill Follow-Up            HPI    Here for medication refill on hydrocodone 10 mg 4 times a day most recent refill of the medication tonight, on Percocet 10 mg 4 times daily quantity 120 per 30 days for chronic back and leg pain, medication still works well controlling his pain without side effects of drowsiness, sedation, memory loss, constipation.  Has seen pain management previously, has tried hydrocodone, NSAIDs, pregabalin, physical therapy, Cymbalta, steroid injections in the past with no benefit.  Percocet 10 mg 4 tablets/day allows him to perform his activities of daily living without side effects.  Blood pressure stable at home 130/80s currently on amlodipine 10 mg, losartan 100 mg, Maxide 2 tablets/day, potassium supplementation 20 mill equivalents, 2 AM, 1 at noon, 2 PM.  Tries to minimize processed food intake, no added salt.  No coffee, no soda, no alcohol, drinks water.  Will take MiraLAX and Colace to prevent constipation.  Paroxysmal atrial fibrillation followed by cardiology, no palpitations, chest pain, has follow-up appoint with cardiology December.  Occasional sinus congestion and ear popping sensation  Medications, allergies, medical history, surgical history, social history, family history  reviewed and updated    Current Outpatient Medications   Medication Sig Dispense Refill    naproxen (NAPROSYN) 500 MG Tab TAKE ONE TABLET BY MOUTH TWICE A DAY AS NEEDED FOR PAIN -GENERIC FOR NAPROSYN 180 Tablet 3    zolpidem (AMBIEN) 10 MG Tab Take 1 Tablet by mouth at bedtime as needed for Sleep for up to 90 days. For up to 90 days 90 Tablet 0    amLODIPine (NORVASC) 10 MG Tab TAKE ONE TABLET BY MOUTH DAILY 90 Tablet 0    triamterene/hctz (MAXZIDE-25/DYAZIDE) 37.5-25 MG Cap       simvastatin (ZOCOR) 20 MG Tab TAKE ONE TABLET BY MOUTH EVERY EVENING 100 Tablet 0    oxyCODONE-acetaminophen (PERCOCET-10)  MG Tab Take 1 Tablet  by mouth every 6 hours as needed for Severe Pain or Moderate Pain (back pain) for up to 30 days. 120 Tablet 0    DULoxetine (CYMBALTA) 60 MG Cap DR Particles delayed-release capsule TAKE ONE CAPSULE BY MOUTH DAILY 90 Capsule 3    losartan (COZAAR) 100 MG Tab TAKE ONE TABLET BY MOUTH DAILY 100 Tablet 2    potassium chloride SA (KLOR-CON M20) 20 MEQ Tab CR TAKE 2 TABLETS BY MOUTH EVERY MORNING, 1 TABLET AT NOON, 2 TABLETS EVERY EVENING. CHANGING THE DOSING PER DAY FROM 2 TABLETS TWICE A DAY, ADDING EXTRA DOSE AT MIDDAY. 450 Tablet 3    triamterene-hctz (MAXZIDE-25/DYAZIDE) 37.5-25 MG Tab Take 2 Tablets by mouth every day. 180 Tablet 3    loratadine (CLARITIN) 10 MG Tab       Multiple Vitamins-Minerals (MULTI COMPLETE PO) Take  by mouth.      aspirin (ASA) 81 MG Chew Tab chewable tablet Take 1 Tab by mouth every day. 100 Tab 11    polyethylene glycol/lytes (MIRALAX) PACK Take 17 g by mouth every day.      docusate sodium (COLACE) 100 MG CAPS Take 100 mg by mouth every day.      vitamin D (CHOLECALCIFEROL) 1000 UNIT TABS Take 6,000 Units by mouth every day.       No current facility-administered medications for this visit.              Status post lumbar surgery  2/01 MRI lumbar spine DJD L5-S1 anterolisthesis 4-5 mm marked stenosis saw  neurosurgery  9/09  neurosurgery note  12/09 neurosurgery note, severe left-sided foraminal stenosis, L5-S1 spondylolisthesis 4-5 mm recommend surgery, patient declined, has had epidural with no benefit,tried lyrica and neurontin before, declines cymbalta trial  4/10 note dr. johnson neurosurgery who gives patient norco, he is retiring, and has offered patient surgical therapy versus continuing norco 10 mg which we'll assume dispensing.  10/11 MRI lumbar spine grade 1 spondylolisthesis 9mm, bilateral spondylosis L5, moderate bilateral L5 stenosis, 2.4 cm left kidney cyst  10/11 xray LS, grade 1 spondylolisthesis L5 on S1 increased from prior exam, 1.3 cm, no significant  change in flexion  11/11  pain note right L5-S1 transforaminal epidural  1/16/12 start cymbalta trial (3/12 stop cymbalta no benefit)  1/12 dr. murphy neurosurgery note surgical intervention offered L4 to sacrum decompression  3/26/12 restarted cymbalta    9/26/12  neurosurgery operative note decompression at L5-S1and bilateral foraminotomies,transforaminal lumbar interbody fusion, L4-L5 and L5-S1, with expandable cage 8-12 mm.  2/20/13 dr. murphy neurosurgery note, lumbar films, EMG NCS lower ext inconclusive, repeat MRI lumbar spine pending  3/13/13 MRI lumbar spine postoperative changes L4-S1 lumbar laminectomy, interbody fusion, disc space insert L4-L5 L5-S1, posterior spinal fusion and fixation, L5-S1 moderate right foraminal stenosis. 2.4 cm mid right renal cyst unchanged  3/26/13 dr. murphy neurosurgery note, no further surgical intervention necessary, chronic narcotics norco 10 mg 5 per day; I will assume the norco rx from   1/25/15 off naprosyn and cymbalta; on norco 10 mg one every four hours #150  5/21/15 on percocet 10 mg five per day and on cymbalta 60 mg  3/25/16 start lyrica 50 mg tid for fibromyalgia, continue percocet 10 mg 5 per day and cymbalta 60 mg  4/12/16 increase lyrica to 100 mg tid (50 mg two tid), continue cymbalta 60 mg and percocet 10 mg 5 per day  9/26/16 off lyrica, on percocet 10 mg qid per  pain management and cymbalta  7/9/19 on percocet 10 mg #140 per 28 days from  pain management also on cymbalta 60 mg and naprosyn 500 mg bid  10/7/20  pain note on oxycodone #140 per 28 days will decrease to #112 per 28 days  11/4/20  pain note on oxycodone #112 per 28 days   10/7/21 I am taking over percocet refill, 4 per day #120 per 30 days  12/13/21 percocet 10 mg #120     Status post knee replacement left 2001     Status post hip replacement bilateral  12/03 right total hip replacement  1/04 left total hip replacement       s/p heel surgery  12/09  left foot heel spur surgery     Preventative health  4/6/07 colonoscopy [facility] negative  8/27/15 prevnar at [facility]  12/12/19 tdap  12/12/19 pneumovax  8/2/21 declines colon  8/13/21 FIT negative  8/24/21 hep c ab negative  10/7/21 shingrix second  10/14/21 covid pfizer booster  3/23/23 declines prevnar 20  6/19/23 psa 3.3  6/19/23 vit d 90      Paroxysmal atrial fibrillation  Sees [facility]  2005 s/p maze procedure with a stroke related to that as a complication, have no records at all regarding that, no CT scan or MRI scan and probably had a cardiac catheterization by renal physicians in 2005 that was negative for coronary artery disease  8/11 [facility] note EKG NSR  5/12 [facility] note, on asa daily  11/12 [facility] note  4/29/13 cardiology note  11/3/13 cardiology note on asa  9/22/14 cardiology note sinus, follow up 6 months  5/12/15  cardiology note, paroxysmal atrial fibrillation status post maze operation, sinus rhythm  11/18/15 cardiology note stable follow one year  4/4/17  cardiology note, paroxysmal atrial fibrillation no recurrence, follow-up one year  2/5/19 cardiology note paroxysmal atrial fibrillation, sinus on exam, no recurrence of atrial fibrillation status post maze, follow-up 1 year  6/16/20 cardiology note atrial fibrillation status post maze no recurrence, hypertension stable on current regimen, work on weight loss, follow-up 6 months  7/13/21  cardiology note  1/4/22 declines overnight pulse oximetry  7/6/22 cardiology no changes      neck pain  3/06 MRI cervical spine moderate subdural frontal stenosis C5-C6 lesser extent C6-C7     knee arthritis   9/3/14 x-ray right knee marked tricompartmental osteoarthritis, most severe medial compartment  2/19/16 has seen  orthopedics, declines knee replacement       Insomnia  2009 on ambien 10 mg  2/19/16 unable to taper ambien, referral to behavioral sleep psychology recommended he  declines  9/26/16 on ambien 10 mg work on taper, declines referral to sleep psychology  7/9/18 on ambien declines sleep management referral  7/20/20 taper ambien if possible     Impaired glucose metabolism  11/8/18 A1c 6%  12/6/19 A1c 5.8%  8/24/21 A1c 5.5%  6/19/23 A1c 5.6%      Hypertension  7/10 [facility] note change from hyzaar to enalapril hct 10/25  1/11 [facility] note bp not controlled on enalapril hct 10/25, change back to losartan hct 100/25 and norvasc 10 mg  6/12 K+ 3.0, increase kdur to 20 tid, consider decreasing hctz if possible to decrease K+ supplementation  12/12 cozaar 100 mg, dyazide 37.5/25 mg, kcl 20 meq, norvasc 10 mg  1/29/14 on cozaar 100 mg, dyazide 37.5/25 mg, norvasc 10 mg, klor 20 meq bid; will increase to klor 20 meq tid  9/3//14 K+ 3.1 on klor 20 meq tid, increase to 20 meq two tab bid  9/3/14 urine mac <0.5 on cozaar 100 mg, dyazide 37.5/25 mg, norvasc 10 mg, klor 20 meq two tab bid  6/30/15 Na 132,K+ 3.5 on cozaar 100 mg, dyazide 37.5/25 mg, norvasc 10 mg, klor 20 meq two tab bid  8/31/16 K+ 3.3 on cozaar 100 mg, dyazide 37.5/25 mg, norvasc 10 mg, klor 20 meq two tab qday will increase to 2 bid  7/11/17 K+3.3 on cozaar 100 mg, dyazide 37.5/25 mg, norvasc 10 mg, klor 20 meq two bid  2/5/19 cardiology note paroxysmal atrial fibrillation, sinus on exam, no recurrence of atrial fibrillation status post maze, follow-up 1 year  12/6/19 K+3.5 on cozaar 100 mg, dyazide 37.5/25 mg, norvasc 10 mg, klor 20 meq two bid  6/16/20 cardiology note atrial fibrillation status post maze no recurrence, hypertension stable on current regimen, work on weight loss, follow-up 6 months  7/13/21  cardiology note on cozaar 100 mg, dyazide 37.5/25 mg, norvasc 10 mg, klor 20 meq two bid  8/24/21 K+ 3.4 on klor 20 meq two bid recommend increase to 2 am, 1 noon, 2 pm  6/22/22 K+ 3.4 on klor 20 meq 2 am, 1 noon, 2 pm on cozaar 100 mg, dyazide 37.5/25 mg, norvasc 10 mg   7/6/22 cardiology note no changes   6/19/23 K+ 3.8  on klor 20 meq 2 am, 1 noon, 2 pm on cozaar 100 mg, dyazide 37.5/25 mg, norvasc 10 mg      History stroke  1999 affected right arm, no old records  Some difficulty with short recall, and occasional diff with expressing self when fatigued     History squamous cell carcinoma  Saw  dermatology offered aldara he declined  3/27/17  dermatology note  5/1/17  dermatology biopsy proven squamous cell carcinoma in situ left forehead, here for mohs  9/19/17  dermatology note continue      Fibromyalgia  3/25/16 start lyrica 50 mg tid for fibromyalgia, continue percocet 10 mg 5 per day and cymbalta 60 mg  4/12/16 increase lyrica to 100 mg tid (50 mg two tid), continue cymbalta 60 mg and percocet 10 mg 5 per day  4/28/16 increase lyrica to 150 mg tid, continue cymbalta 60 mg and percocet 10 mg five times per day  5/10/16 on lyrica 50 mg three tabs tid, change to 100 mg am, 150 mg noon, 100 mg pm     Dyslipidemia  Followed by cardiology  2/09 cholesterol 135, triglycerides 100, HDL 47, LDL 68  8/09 chol 138,trig 101,hdl 49,ldl 69  3/10 chol 123,trig 66,hdl 49,ldl 61  7/10 [facility] note change vytorin to zocor 40  11/10 chol 141,trig 75,hdl 62,ldl 64 on vytorin 10/20 mg per [facility]  10/11 chol 159,trig 102,hdl 52,ldl 87 on vytorin 10/20 per [facility]  2/29/12 stop vytorin, change to zocor 20 mg per [facility]  6/12 chol 152,trig 102,hdl 49,ldl 83 on zocor 20 mg  6/13 chol 150,trig 130,hdl 52,ldl 72 on zocor 20 mg  1/29/14 chol 147,trig 127,hdl 50,ldl 72 on zocor 20 mg  9/3/14 chol 169,trig 228,hdl 49,ldl 74 on zocor 20 mg  6/30/15 chol 151,trig 88,hdl 63,ldl 70 on zocor 20 mg  8/31/16 chol 169,trig 130,hdl 59,ldl 84 on zocor 20 mg  7/11/17 chol 147,trig 146,hdl 51,ldl 67 on zocor 20 mg  11/8/18 chol 189,trig 215,hdl 54,ldl 92 on zocor 20 mg  12/6/19 chol 163,trig 214,hdl 54,ldl 66 on zocor 20 mg  8/24/21 chol 166,trig 142,hdl 57,ldl 81 on zocor 20 mg  6/22/22 chol 149,trig 139,hdl 48,ldl 73 on zocor 20  "mg  6/19/23 chol 153,trig 160,hdl 51,ldl 70 on zocor 20 mg    Depression  3/12 tried cymalta for pain no benefit  12/6/13 PHQ 9 score 15, start cymbalta samples 30 mg x 7 days, then 60 mg  12/20/13 cymbalta 60 mg  1/22/15 off cymbalta    5/21/15 on cymbalta 60 mg  2/19/16 referral to behavioral health recommended he declines  11/29/16 depression screening score 0, continues on cymbalta  1/30/18 depression screening score 0 on cymbalta  8/2/21 on cymbalta screening 0 score   1/4/22 on cymbalta screening 0/3 score     Chronic opiate  12/29/22 pain contract  3/23/23 urine specimen not enough for UDT  Has tried hydrocodone, NSAIDs, pregabalin, steroid injections, physical therapy, cymbalta                    Patient Active Problem List   Diagnosis    S/P hip replacement    S/p lumbar surgery    Neck pain    Hypertension    Paroxysmal atrial fibrillation ([facility])    Dyslipidemia    History of squamous cell carcinoma    Preventative health care    S/P knee replacement    Morbid (severe) obesity due to excess calories ([facility])    S/p heel left surgery    History of stroke    Insomnia    Depression, major, in full remission ([facility])    Chronic pain    Knee arthritis    Opiate dependence, continuous (CMS-[facility])    S/P Maze operation for atrial fibrillation    Fibromyalgia    Impaired glucose metabolism                   Patient Care Team:  Norman Peterson M.D. as PCP - General  Normandary Peterson M.D. as PCP - [facility]  Jose Martin Campbell M.D. as Consulting Physician (Anesthesiology)  Irineo Skinner M.D. as Consulting Physician (Internal Medicine Clinical Cardiac Electrophysiology)  Pete Rangel O.D. as Consulting Physician (Optometry)  Reji Thomas as [facility]     ROS           Objective     BP (!) 146/90 (BP Location: Right arm, Patient Position: Sitting, BP Cuff Size: Large adult)   Pulse (!) 109   Temp 36.6 °C (97.8 °F)   Resp 16   Ht 1.778 m (5' 10\")   Wt 121 kg (267 lb)   SpO2 93%   " BMI 38.31 kg/m²      Physical Exam  Vitals and nursing note reviewed.   Constitutional:       Appearance: Normal appearance.   HENT:      Head: Normocephalic and atraumatic.      Right Ear: Tympanic membrane and external ear normal.      Left Ear: Tympanic membrane and external ear normal.   Eyes:      Conjunctiva/sclera: Conjunctivae normal.   Cardiovascular:      Rate and Rhythm: Normal rate and regular rhythm.      Heart sounds: Normal heart sounds.   Pulmonary:      Effort: Pulmonary effort is normal.      Breath sounds: Normal breath sounds.   Abdominal:      General: There is no distension.   Musculoskeletal:         General: No swelling.   Skin:     Coloration: Skin is not jaundiced.   Neurological:      General: No focal deficit present.      Mental Status: He is alert.   Psychiatric:         Mood and Affect: Mood normal.         Behavior: Behavior normal.                             Assessment & Plan        Assessment    #1 chronic opiate therapy Percocet 10 mg 4 times daily quantity 120 per 30 days, most recent refill June 9, medication is effective in treating his chronic back and leg pain without side effects, has seen pain management previously, has tried physical therapy    #2 Percocet 10 mg 1 p.o. 4 times daily quantity 120 per 30 days does not cause drowsiness, sedation, memory loss, depression, medication is effective for chronic pain    #3 hypertension elevated blood pressure today, has been checking his blood pressures at home running 130s over 80 checks this a few times per month, blood pressure a bit high today but has been stable at home checking this regularly    #4 dyslipidemia on Zocor 6/19/23 chol 153,trig 160,hdl 51,ldl 70 on zocor 20 mg    #5 BMI 38.31 working on good nutrition program    #6 hypokalemia due to Maxide therapy, remains on potassium supplementation, most recent potassium 3.8 on June 19, normal renal function    #7 eustachian tube dysfunction TMs no evidence of otitis, no  evidence of cerumen    #8 paroxysmal atrial fibrillation followed by cardiology no breakthrough symptoms    Plan  #!  reviewed    #2 urine drug screen today    #3 Percocet 10 mg 4 times daily is effective for chronic pain without side effects of drowsiness, sedation, depression, memory loss, I believe long-term benefits of medication outweighs potential risks of habituation and dependence, allowing him to perform his ADLs, prescription sent to his pharmacy for the next 3 months Percocet 10 mg 3 times daily quantity 120 for 30 days    #4 continue check blood pressure regularly, slightly higher today but his home blood pressure readings have been stable    #5 continue work on a good nutrition program, he has lost some weight from his last visit continue limit processed foods and salt in his diet    #6 follow-up cardiology    #7 reviewed laboratory testing from June 19    #8 follow-up 3 months

## 2023-06-26 LAB
1OH-MIDAZOLAM UR QL SCN: NOT DETECTED
6MAM UR QL: NOT DETECTED
7AMINOCLONAZEPAM UR QL: NOT DETECTED
A-OH ALPRAZ UR QL: NOT DETECTED
ALPRAZ UR QL: NOT DETECTED
AMPHET UR QL SCN: NOT DETECTED
ANNOTATION COMMENT IMP: NORMAL
ANNOTATION COMMENT IMP: NORMAL
BARBITURATES UR QL: NOT DETECTED
BUPRENORPHINE UR QL: NOT DETECTED
BZE UR QL: NOT DETECTED
CARBOXYTHC UR QL: NOT DETECTED
CARISOPRODOL UR QL: NOT DETECTED
CLONAZEPAM UR QL: NOT DETECTED
CODEINE UR QL: NOT DETECTED
DIAZEPAM UR QL: NOT DETECTED
ETHYL GLUCURONIDE UR QL: NOT DETECTED
FENTANYL UR QL: NOT DETECTED
GABAPENTIN UR QL: NOT DETECTED
HYDROCODONE UR QL: NOT DETECTED
HYDROMORPHONE UR QL: NOT DETECTED
LORAZEPAM UR QL: NOT DETECTED
MDA UR QL: NOT DETECTED
MDEA UR QL: NOT DETECTED
MDMA UR QL: NOT DETECTED
MEPERIDINE UR QL: NOT DETECTED
METHADONE UR QL: NOT DETECTED
METHAMPHET UR QL: NOT DETECTED
MIDAZOLAM UR QL SCN: NOT DETECTED
MORPHINE UR QL: NOT DETECTED
NALOXONE UR QL SCN: NOT DETECTED
NORBUPRENORPHINE UR QL CFM: NOT DETECTED
NORDIAZEPAM UR QL: NOT DETECTED
NORFENTANYL UR QL: NOT DETECTED
NORHYDROCODONE UR QL CFM: NOT DETECTED
NOROXYCODONE UR QL CFM: PRESENT
NOROXYMORPH CO100 Q0458: PRESENT
OXAZEPAM UR QL: NOT DETECTED
OXYCODONE UR QL: PRESENT
OXYMORPHONE UR QL: PRESENT
PATHOLOGY STUDY: NORMAL
PCP UR QL: NOT DETECTED
PHENTERMINE UR QL: NOT DETECTED
PPAA UR QL: NOT DETECTED
PREGABALIN UR QL SCN: NOT DETECTED
SERVICE CMNT-IMP: NORMAL
TAPENADOL OSULF CO200 Q0473: NOT DETECTED
TAPENTADOL UR QL SCN: NOT DETECTED
TEMAZEPAM UR QL: NOT DETECTED
TRAMADOL UR QL: NOT DETECTED
ZOLPIDEM PHENYL-4-CARB UR QL SCN: PRESENT
ZOLPIDEM UR QL: PRESENT

## 2023-07-01 DIAGNOSIS — E78.5 DYSLIPIDEMIA: ICD-10-CM

## 2023-07-06 RX ORDER — SIMVASTATIN 20 MG
TABLET ORAL
Qty: 24 TABLET | Refills: 0 | Status: SHIPPED | OUTPATIENT
Start: 2023-07-06 | End: 2023-09-22

## 2023-07-31 ENCOUNTER — TELEMEDICINE (OUTPATIENT)
Dept: CARDIOLOGY | Facility: MEDICAL CENTER | Age: 78
End: 2023-07-31
Attending: INTERNAL MEDICINE
Payer: MEDICARE

## 2023-07-31 VITALS
HEIGHT: 70 IN | HEART RATE: 68 BPM | BODY MASS INDEX: 38.22 KG/M2 | DIASTOLIC BLOOD PRESSURE: 78 MMHG | WEIGHT: 267 LBS | SYSTOLIC BLOOD PRESSURE: 131 MMHG

## 2023-07-31 DIAGNOSIS — E78.5 DYSLIPIDEMIA: ICD-10-CM

## 2023-07-31 DIAGNOSIS — Z86.73 HISTORY OF STROKE: Chronic | ICD-10-CM

## 2023-07-31 DIAGNOSIS — Z98.890 S/P MAZE OPERATION FOR ATRIAL FIBRILLATION: ICD-10-CM

## 2023-07-31 DIAGNOSIS — I48.0 PAROXYSMAL ATRIAL FIBRILLATION (HCC): Chronic | ICD-10-CM

## 2023-07-31 DIAGNOSIS — Z86.79 S/P MAZE OPERATION FOR ATRIAL FIBRILLATION: ICD-10-CM

## 2023-07-31 DIAGNOSIS — I10 PRIMARY HYPERTENSION: Chronic | ICD-10-CM

## 2023-07-31 DIAGNOSIS — E66.01 MORBID (SEVERE) OBESITY DUE TO EXCESS CALORIES (HCC): ICD-10-CM

## 2023-07-31 PROCEDURE — 99213 OFFICE O/P EST LOW 20 MIN: CPT | Mod: 95 | Performed by: INTERNAL MEDICINE

## 2023-07-31 RX ORDER — SIMVASTATIN 20 MG
20 TABLET ORAL NIGHTLY
Qty: 90 TABLET | Refills: 11 | Status: SHIPPED | OUTPATIENT
Start: 2023-07-31 | End: 2024-02-26 | Stop reason: SDUPTHER

## 2023-07-31 ASSESSMENT — FIBROSIS 4 INDEX: FIB4 SCORE: 1.54

## 2023-07-31 NOTE — PROGRESS NOTES
Virtual Visit: Established Patient   This visit was conducted via Zoom using secure and encrypted videoconferencing technology.   The patient was in their home in the state [facility].    The patient's identity was confirmed and verbal consent was obtained for this virtual visit.     Subjective:   CC:   Chief Complaint   Patient presents with    Atrial Fibrillation     F/v Dx:PAF (paroxysmal atrial fibrillation) ([facility])       Julio Gonzalez is a 78 y.o. male presenting for evaluation and management of:    Paroxysmal atrial fibrillation status post surgical maze with left atrial appendage ligation.  No arrhythmias.  Obesity and orthopedic issues.  Borderline diabetes A1c 5.6.  On statins      Current medicines (including changes today)  Current Outpatient Medications   Medication Sig Dispense Refill    simvastatin (ZOCOR) 20 MG Tab TAKE 1 TABLET BY MOUTH EVERY EVENING. KEEP CURRENT APPOINTMENT TO ENSURE FUTURE REFILLS. 24 Tablet 0    potassium chloride SA (KLOR-CON M20) 20 MEQ Tab CR TAKE TWO TABLETS BY MOUTH EVERY MORNING, ONE TABLET AT NOON, AND TWO TABLETS EVERY EVENING; (DOSING CHANGE FROM TWO TABLETS TWICE A DAY, ADDING EXTRA DOSE AT MIDDAY) 450 Tablet 3    oxyCODONE-acetaminophen (PERCOCET-10)  MG Tab Take 1 Tablet by mouth every 6 hours as needed for Severe Pain for up to 30 days. 120 Tablet 0    [START ON 8/8/2023] oxyCODONE-acetaminophen (PERCOCET-10)  MG Tab Take 1 Tablet by mouth every 6 hours as needed for Severe Pain for up to 30 days. 120 Tablet 0    [START ON 9/7/2023] oxyCODONE-acetaminophen (PERCOCET-10)  MG Tab Take 1 Tablet by mouth every 6 hours as needed for Severe Pain for up to 30 days. 120 Tablet 0    zolpidem (AMBIEN) 10 MG Tab Take 1 Tablet by mouth at bedtime as needed for Sleep for up to 90 days. For up to 90 days 90 Tablet 0    amLODIPine (NORVASC) 10 MG Tab TAKE ONE TABLET BY MOUTH DAILY 90 Tablet 0    DULoxetine (CYMBALTA) 60 MG Cap DR Particles delayed-release  "capsule TAKE ONE CAPSULE BY MOUTH DAILY 90 Capsule 3    losartan (COZAAR) 100 MG Tab TAKE ONE TABLET BY MOUTH DAILY 100 Tablet 2    naproxen (NAPROSYN) 500 MG Tab TAKE ONE TABLET BY MOUTH TWICE A DAY AS NEEDED FOR PAIN -GENERIC FOR NAPROSYN 180 Tablet 3    triamterene-hctz (MAXZIDE-25/DYAZIDE) 37.5-25 MG Tab Take 2 Tablets by mouth every day. 180 Tablet 3    loratadine (CLARITIN) 10 MG Tab       Multiple Vitamins-Minerals (MULTI COMPLETE PO) Take  by mouth.      aspirin (ASA) 81 MG Chew Tab chewable tablet Take 1 Tab by mouth every day. 100 Tab 11    polyethylene glycol/lytes (MIRALAX) PACK Take 17 g by mouth every day.      docusate sodium (COLACE) 100 MG CAPS Take 100 mg by mouth every day.      vitamin D (CHOLECALCIFEROL) 1000 UNIT TABS Take 6,000 Units by mouth every day.       No current facility-administered medications for this visit.       Patient Active Problem List    Diagnosis Date Noted    Impaired glucose metabolism 11/08/2018    Fibromyalgia 03/26/2016    S/P Maze operation for atrial fibrillation 11/18/2015    Opiate dependence, continuous (CMS-[facility]) 05/20/2015    Knee arthritis 09/03/2014    Depression, major, in full remission ([facility]) 12/06/2013    Chronic pain 12/06/2013    Insomnia 02/08/2013    History of stroke 08/05/2011    S/p heel left surgery 11/02/2010    Morbid (severe) obesity due to excess calories ([facility])     S/P hip replacement 09/16/2009    S/p lumbar surgery 09/16/2009    Neck pain 09/16/2009    Hypertension 09/16/2009    Paroxysmal atrial fibrillation (HCC) 09/16/2009    Dyslipidemia 09/16/2009    History of squamous cell carcinoma 09/16/2009    Preventative health care 09/16/2009    S/P knee replacement 09/16/2009        Objective:   /78 (BP Location: Left arm, Patient Position: Sitting, BP Cuff Size: Adult) Comment: pt's home cuff  Pulse 68   Ht 1.778 m (5' 10\")   Wt 121 kg (267 lb)   BMI 38.31 kg/m²     Physical Exam: Obese  Constitutional: Alert, no distress, " well-groomed.      Assessment and Plan:   The following treatment plan was discussed:     1. Paroxysmal atrial fibrillation (HCC)    2. Primary hypertension    3. S/P Maze operation for atrial fibrillation    4. History of stroke    5. Morbid (severe) obesity due to excess calories (HCC)  1.  Obesity and prediabetes discussed the use of Ozempic he will consider.  2.  Previous CVA.  3.  Atrial fibrillation status post surgical maze.  4.  Hypertension continue current regimen.  5.  Follow-up with me in 6 months

## 2023-08-06 DIAGNOSIS — I10 ESSENTIAL HYPERTENSION: ICD-10-CM

## 2023-08-06 DIAGNOSIS — I48.0 PAF (PAROXYSMAL ATRIAL FIBRILLATION) (HCC): ICD-10-CM

## 2023-08-08 RX ORDER — AMLODIPINE BESYLATE 10 MG/1
10 TABLET ORAL DAILY
Qty: 90 TABLET | Refills: 3 | Status: SHIPPED | OUTPATIENT
Start: 2023-08-08

## 2023-08-08 NOTE — TELEPHONE ENCOUNTER
Is the patient due for a refill? Yes    Was the patient seen the past year? Yes    Date of last office visit: 7/31/2023    Does the patient have an upcoming appointment?  No   If yes, When?     Provider to refill:DS    Does the patients insurance require a 100 day supply?  Yes

## 2023-08-09 ENCOUNTER — TELEPHONE (OUTPATIENT)
Dept: MEDICAL GROUP | Facility: MEDICAL CENTER | Age: 78
End: 2023-08-09

## 2023-08-09 DIAGNOSIS — I48.0 PAF (PAROXYSMAL ATRIAL FIBRILLATION) (HCC): ICD-10-CM

## 2023-08-09 DIAGNOSIS — Z79.899 LONG TERM CURRENT USE OF DIURETIC: ICD-10-CM

## 2023-08-09 DIAGNOSIS — I10 ESSENTIAL HYPERTENSION: ICD-10-CM

## 2023-08-10 NOTE — TELEPHONE ENCOUNTER
Is the patient due for a refill? Yes    Was the patient seen the past year? Yes    Date of last office visit: 7/31/23    Does the patient have an upcoming appointment?  No   If yes, When?     Provider to refill:ds    Does the patients insurance require a 100 day supply?  Yes

## 2023-08-10 NOTE — TELEPHONE ENCOUNTER
"I received this [employer] message in the patient's wife's [employer] account.  This should be in his chart so I have cut-and-paste the message from 8/9/2023.    \"Juliana Gonzalez called states they only gave her 15 days worth of oxycodone filled yesterday states she wanted to know if she could get the remaining sent in either for 30 or 15. She also called karina and was told that they were also low she is concerned about what will happen after her 15 days is over.      Pt asked if there is also an alternative she can take.     Please call her to her mobile [phone] ([phone] \"      Please call [employer] [phone] and ask the pharmacy why the patient only received a quantity of #60 hydrocodone 10 mg on his most recent refill August 8.  The prescription was sent for a quantity #120.      "

## 2023-08-11 DIAGNOSIS — M54.9 BACK PAIN, UNSPECIFIED BACK LOCATION, UNSPECIFIED BACK PAIN LATERALITY, UNSPECIFIED CHRONICITY: ICD-10-CM

## 2023-08-11 DIAGNOSIS — M17.11 ARTHRITIS OF KNEE, RIGHT: Chronic | ICD-10-CM

## 2023-08-11 RX ORDER — OXYCODONE AND ACETAMINOPHEN 10; 325 MG/1; MG/1
1 TABLET ORAL EVERY 6 HOURS PRN
Qty: 60 TABLET | Refills: 0 | Status: SHIPPED | OUTPATIENT
Start: 2023-08-11 | End: 2023-08-26

## 2023-08-11 RX ORDER — TRIAMTERENE AND HYDROCHLOROTHIAZIDE 37.5; 25 MG/1; MG/1
2 CAPSULE ORAL DAILY
Qty: 200 CAPSULE | Refills: 0 | Status: SHIPPED | OUTPATIENT
Start: 2023-08-11 | End: 2023-11-27

## 2023-08-11 RX ORDER — LOSARTAN POTASSIUM 100 MG/1
TABLET ORAL
Qty: 100 TABLET | Refills: 0 | Status: SHIPPED | OUTPATIENT
Start: 2023-08-11 | End: 2023-11-27

## 2023-08-11 NOTE — TELEPHONE ENCOUNTER
Juliana left a message this am explaining that [facility] can now fill the remainder of this Rx. Spoke with Irineo (pharmacist) at [facility] and he states that they will need a new Rx to fill the remainder of this months Rx. They do have enough stock on hand to fill today. Please send a new Rx.

## 2023-08-23 DIAGNOSIS — I10 ESSENTIAL HYPERTENSION: ICD-10-CM

## 2023-08-23 DIAGNOSIS — I48.0 PAF (PAROXYSMAL ATRIAL FIBRILLATION) (HCC): ICD-10-CM

## 2023-09-22 ENCOUNTER — OFFICE VISIT (OUTPATIENT)
Dept: MEDICAL GROUP | Facility: MEDICAL CENTER | Age: 78
End: 2023-09-22
Payer: MEDICARE

## 2023-09-22 VITALS
SYSTOLIC BLOOD PRESSURE: 138 MMHG | HEIGHT: 67 IN | TEMPERATURE: 97.3 F | OXYGEN SATURATION: 95 % | RESPIRATION RATE: 16 BRPM | HEART RATE: 82 BPM | BODY MASS INDEX: 41.28 KG/M2 | WEIGHT: 263 LBS | DIASTOLIC BLOOD PRESSURE: 74 MMHG

## 2023-09-22 DIAGNOSIS — F11.20 OPIATE DEPENDENCE, CONTINUOUS (HCC): Chronic | ICD-10-CM

## 2023-09-22 DIAGNOSIS — M17.11 ARTHRITIS OF KNEE, RIGHT: Chronic | ICD-10-CM

## 2023-09-22 DIAGNOSIS — Z00.00 PREVENTATIVE HEALTH CARE: Chronic | ICD-10-CM

## 2023-09-22 DIAGNOSIS — E66.01 MORBID (SEVERE) OBESITY DUE TO EXCESS CALORIES (HCC): ICD-10-CM

## 2023-09-22 DIAGNOSIS — G89.29 OTHER CHRONIC PAIN: ICD-10-CM

## 2023-09-22 DIAGNOSIS — M54.9 BACK PAIN, UNSPECIFIED BACK LOCATION, UNSPECIFIED BACK PAIN LATERALITY, UNSPECIFIED CHRONICITY: ICD-10-CM

## 2023-09-22 DIAGNOSIS — G47.00 INSOMNIA, UNSPECIFIED TYPE: Chronic | ICD-10-CM

## 2023-09-22 DIAGNOSIS — E78.5 DYSLIPIDEMIA: Chronic | ICD-10-CM

## 2023-09-22 PROCEDURE — 3075F SYST BP GE 130 - 139MM HG: CPT | Performed by: INTERNAL MEDICINE

## 2023-09-22 PROCEDURE — 3078F DIAST BP <80 MM HG: CPT | Performed by: INTERNAL MEDICINE

## 2023-09-22 PROCEDURE — 99214 OFFICE O/P EST MOD 30 MIN: CPT | Performed by: INTERNAL MEDICINE

## 2023-09-22 RX ORDER — OXYCODONE AND ACETAMINOPHEN 10; 325 MG/1; MG/1
1 TABLET ORAL EVERY 6 HOURS PRN
Qty: 120 TABLET | Refills: 0 | Status: SHIPPED | OUTPATIENT
Start: 2023-12-06 | End: 2023-12-19 | Stop reason: SDUPTHER

## 2023-09-22 RX ORDER — OXYCODONE AND ACETAMINOPHEN 10; 325 MG/1; MG/1
1 TABLET ORAL EVERY 6 HOURS PRN
Qty: 120 TABLET | Refills: 0 | Status: SHIPPED | OUTPATIENT
Start: 2023-11-06 | End: 2023-12-06

## 2023-09-22 RX ORDER — OXYCODONE AND ACETAMINOPHEN 10; 325 MG/1; MG/1
1 TABLET ORAL EVERY 6 HOURS PRN
Qty: 120 TABLET | Refills: 0 | Status: SHIPPED | OUTPATIENT
Start: 2023-10-07 | End: 2023-11-06

## 2023-09-22 ASSESSMENT — FIBROSIS 4 INDEX: FIB4 SCORE: 1.54

## 2023-09-22 NOTE — PROGRESS NOTES
Subjective     Julio Gonzalez is a 78 y.o. male who presents with  med refill Follow-Up            HPI  Patient is here with his wife for medication refill for chronic back pain, on oxycodone 10 mg quantity 120 per 30 days, pain is stable and controlled on the Percocet without side effects of drowsiness, sedation, constipation, numbness, depression.  Previously has had lumbar spinal surgery 2012, has seen physical therapy, pain management.  Last Percocet refill September 7.  Blood pressure 135/79 remains on amlodipine 10 mg, losartan 100 mg, tries to eat healthy limiting sweets and candies. Eats divya lawrence egg white and turkey, can tuna and wheat thins, and divya ryann for dinner or cottage cheese   Patient does not exercise at all.  Also followed by cardiology paroxysmal atrial fibrillation no recurrent symptoms.  Knee arthritis, has seen orthopedics in the past for injections, does have right knee pain at times, he is status post left knee replacement.  Previous x-rays right side has showed severe arthritis and has been offered knee replacement in the past per orthopedics.  No NSAIDs.  Medications, allergies, medical history, surgical history, social history, family history  reviewed and updated        Current Outpatient Medications   Medication Sig Dispense Refill    amLODIPine (NORVASC) 10 MG Tab TAKE 1 TABLET BY MOUTH DAILY 90 Tablet 3    zolpidem (AMBIEN) 10 MG Tab Take 1 Tablet by mouth at bedtime as needed for Sleep for up to 90 days. For up to 90 days 90 Tablet 0    losartan (COZAAR) 100 MG Tab TAKE ONE TABLET BY MOUTH DAILY 100 Tablet 0    triamterene/hctz (MAXZIDE-25/DYAZIDE) 37.5-25 MG Cap TAKE TWO CAPSULES BY MOUTH DAILY 200 Capsule 0    simvastatin (ZOCOR) 20 MG Tab Take 1 Tablet by mouth every evening. 90 Tablet 11    simvastatin (ZOCOR) 20 MG Tab TAKE 1 TABLET BY MOUTH EVERY EVENING. KEEP CURRENT APPOINTMENT TO ENSURE FUTURE REFILLS. 24 Tablet 0    potassium chloride SA (KLOR-CON M20) 20 MEQ  Tab CR TAKE TWO TABLETS BY MOUTH EVERY MORNING, ONE TABLET AT NOON, AND TWO TABLETS EVERY EVENING; (DOSING CHANGE FROM TWO TABLETS TWICE A DAY, ADDING EXTRA DOSE AT MIDDAY) 450 Tablet 3    oxyCODONE-acetaminophen (PERCOCET-10)  MG Tab Take 1 Tablet by mouth every 6 hours as needed for Severe Pain for up to 30 days. 120 Tablet 0    DULoxetine (CYMBALTA) 60 MG Cap DR Particles delayed-release capsule TAKE ONE CAPSULE BY MOUTH DAILY 90 Capsule 3    naproxen (NAPROSYN) 500 MG Tab TAKE ONE TABLET BY MOUTH TWICE A DAY AS NEEDED FOR PAIN -GENERIC FOR NAPROSYN 180 Tablet 3    loratadine (CLARITIN) 10 MG Tab       Multiple Vitamins-Minerals (MULTI COMPLETE PO) Take  by mouth.      aspirin (ASA) 81 MG Chew Tab chewable tablet Take 1 Tab by mouth every day. 100 Tab 11    polyethylene glycol/lytes (MIRALAX) PACK Take 17 g by mouth every day.      docusate sodium (COLACE) 100 MG CAPS Take 100 mg by mouth every day.      vitamin D (CHOLECALCIFEROL) 1000 UNIT TABS Take 6,000 Units by mouth every day.       No current facility-administered medications for this visit.                               Status post lumbar surgery  2/01 MRI lumbar spine DJD L5-S1 anterolisthesis 4-5 mm marked stenosis saw  neurosurgery  9/09  neurosurgery note  12/09 neurosurgery note, severe left-sided foraminal stenosis, L5-S1 spondylolisthesis 4-5 mm recommend surgery, patient declined, has had epidural with no benefit,tried lyrica and neurontin before, declines cymbalta trial  4/10 note dr. johnson neurosurgery who gives patient norco, he is retiring, and has offered patient surgical therapy versus continuing norco 10 mg which we'll assume dispensing.  10/11 MRI lumbar spine grade 1 spondylolisthesis 9mm, bilateral spondylosis L5, moderate bilateral L5 stenosis, 2.4 cm left kidney cyst  10/11 xray LS, grade 1 spondylolisthesis L5 on S1 increased from prior exam, 1.3 cm, no significant change in flexion  11/11  pain  note right L5-S1 transforaminal epidural  1/16/12 start cymbalta trial (3/12 stop cymbalta no benefit)  1/12 dr. murphy neurosurgery note surgical intervention offered L4 to sacrum decompression  3/26/12 restarted cymbalta    9/26/12  neurosurgery operative note decompression at L5-S1and bilateral foraminotomies,transforaminal lumbar interbody fusion, L4-L5 and L5-S1, with expandable cage 8-12 mm.  2/20/13 dr. murphy neurosurgery note, lumbar films, EMG NCS lower ext inconclusive, repeat MRI lumbar spine pending  3/13/13 MRI lumbar spine postoperative changes L4-S1 lumbar laminectomy, interbody fusion, disc space insert L4-L5 L5-S1, posterior spinal fusion and fixation, L5-S1 moderate right foraminal stenosis. 2.4 cm mid right renal cyst unchanged  3/26/13 dr. murphy neurosurgery note, no further surgical intervention necessary, chronic narcotics norco 10 mg 5 per day; I will assume the norco rx from   1/25/15 off naprosyn and cymbalta; on norco 10 mg one every four hours #150  5/21/15 on percocet 10 mg five per day and on cymbalta 60 mg  3/25/16 start lyrica 50 mg tid for fibromyalgia, continue percocet 10 mg 5 per day and cymbalta 60 mg  4/12/16 increase lyrica to 100 mg tid (50 mg two tid), continue cymbalta 60 mg and percocet 10 mg 5 per day  9/26/16 off lyrica, on percocet 10 mg qid per  pain management and cymbalta  7/9/19 on percocet 10 mg #140 per 28 days from  pain management also on cymbalta 60 mg and naprosyn 500 mg bid  10/7/20  pain note on oxycodone #140 per 28 days will decrease to #112 per 28 days  11/4/20  pain note on oxycodone #112 per 28 days   10/7/21 I am taking over percocet refill, 4 per day #120 per 30 days  12/13/21 percocet 10 mg #120     Status post knee replacement left 2001     Status post hip replacement bilateral  12/03 right total hip replacement  1/04 left total hip replacement      s/p heel surgery  12/09   left foot heel spur surgery     Preventative health  4/6/07 colonoscopy [facility] negative  8/27/15 prevnar at [facility]  12/12/19 tdap  12/12/19 pneumovax  8/2/21 declines colon  8/13/21 FIT negative  8/24/21 hep c ab negative  10/7/21 shingrix second  3/23/23 declines prevnar 20  6/19/23 psa 3.3  6/19/23 vit d 90      Paroxysmal atrial fibrillation  Sees [facility]  2005 s/p maze procedure with a stroke related to that as a complication, have no records at all regarding that, no CT scan or MRI scan and probably had a cardiac catheterization by renal physicians in 2005 that was negative for coronary artery disease  8/11 [facility] note EKG NSR  5/12 [facility] note, on asa daily  11/12 [facility] note  4/29/13 cardiology note  11/3/13 cardiology note on asa  9/22/14 cardiology note sinus, follow up 6 months  5/12/15  cardiology note, paroxysmal atrial fibrillation status post maze operation, sinus rhythm  11/18/15 cardiology note stable follow one year  4/4/17  cardiology note, paroxysmal atrial fibrillation no recurrence, follow-up one year  2/5/19 cardiology note paroxysmal atrial fibrillation, sinus on exam, no recurrence of atrial fibrillation status post maze, follow-up 1 year  6/16/20 cardiology note atrial fibrillation status post maze no recurrence, hypertension stable on current regimen, work on weight loss, follow-up 6 months  7/13/21  cardiology note  1/4/22 declines overnight pulse oximetry  7/6/22 cardiology no changes   7/31/23  cardiology note stable follow up 6 months     neck pain  3/06 MRI cervical spine moderate subdural frontal stenosis C5-C6 lesser extent C6-C7     knee arthritis   9/3/14 x-ray right knee marked tricompartmental osteoarthritis, most severe medial compartment  2/19/16 has seen  orthopedics, declines knee replacement       Insomnia  2009 on ambien 10 mg  2/19/16 unable to taper ambien, referral to behavioral sleep psychology recommended he declines  9/26/16 on ambien 10 mg  work on taper, declines referral to sleep psychology  7/9/18 on ambien declines sleep management referral  7/20/20 taper ambien if possible     Impaired glucose metabolism  11/8/18 A1c 6%  12/6/19 A1c 5.8%  8/24/21 A1c 5.5%  6/19/23 A1c 5.6%      Hypertension  7/10 [facility] note change from hyzaar to enalapril hct 10/25 1/11 [facility] note bp not controlled on enalapril hct 10/25, change back to losartan hct 100/25 and norvasc 10 mg  6/12 K+ 3.0, increase kdur to 20 tid, consider decreasing hctz if possible to decrease K+ supplementation  12/12 cozaar 100 mg, dyazide 37.5/25 mg, kcl 20 meq, norvasc 10 mg  1/29/14 on cozaar 100 mg, dyazide 37.5/25 mg, norvasc 10 mg, klor 20 meq bid; will increase to klor 20 meq tid  9/3//14 K+ 3.1 on klor 20 meq tid, increase to 20 meq two tab bid  9/3/14 urine mac <0.5 on cozaar 100 mg, dyazide 37.5/25 mg, norvasc 10 mg, klor 20 meq two tab bid  6/30/15 Na 132,K+ 3.5 on cozaar 100 mg, dyazide 37.5/25 mg, norvasc 10 mg, klor 20 meq two tab bid  8/31/16 K+ 3.3 on cozaar 100 mg, dyazide 37.5/25 mg, norvasc 10 mg, klor 20 meq two tab qday will increase to 2 bid  7/11/17 K+3.3 on cozaar 100 mg, dyazide 37.5/25 mg, norvasc 10 mg, klor 20 meq two bid  2/5/19 cardiology note paroxysmal atrial fibrillation, sinus on exam, no recurrence of atrial fibrillation status post maze, follow-up 1 year  12/6/19 K+3.5 on cozaar 100 mg, dyazide 37.5/25 mg, norvasc 10 mg, klor 20 meq two bid  6/16/20 cardiology note atrial fibrillation status post maze no recurrence, hypertension stable on current regimen, work on weight loss, follow-up 6 months  7/13/21  cardiology note on cozaar 100 mg, dyazide 37.5/25 mg, norvasc 10 mg, klor 20 meq two bid  8/24/21 K+ 3.4 on klor 20 meq two bid recommend increase to 2 am, 1 noon, 2 pm  6/22/22 K+ 3.4 on klor 20 meq 2 am, 1 noon, 2 pm on cozaar 100 mg, dyazide 37.5/25 mg, norvasc 10 mg   7/6/22 cardiology note no changes   6/19/23 K+ 3.8 on klor 20 meq 2 am, 1 noon, 2 pm  on cozaar 100 mg, dyazide 37.5/25 mg, norvasc 10 mg      History stroke  1999 affected right arm, no old records  Some difficulty with short recall, and occasional diff with expressing self when fatigued     History squamous cell carcinoma  Saw  dermatology offered aldara he declined  3/27/17  dermatology note  5/1/17  dermatology biopsy proven squamous cell carcinoma in situ left forehead, here for mohs  9/19/17  dermatology note continue      Fibromyalgia  3/25/16 start lyrica 50 mg tid for fibromyalgia, continue percocet 10 mg 5 per day and cymbalta 60 mg  4/12/16 increase lyrica to 100 mg tid (50 mg two tid), continue cymbalta 60 mg and percocet 10 mg 5 per day  4/28/16 increase lyrica to 150 mg tid, continue cymbalta 60 mg and percocet 10 mg five times per day  5/10/16 on lyrica 50 mg three tabs tid, change to 100 mg am, 150 mg noon, 100 mg pm     Dyslipidemia  Followed by cardiology  2/09 cholesterol 135, triglycerides 100, HDL 47, LDL 68  8/09 chol 138,trig 101,hdl 49,ldl 69  3/10 chol 123,trig 66,hdl 49,ldl 61  7/10 [facility] note change vytorin to zocor 40  11/10 chol 141,trig 75,hdl 62,ldl 64 on vytorin 10/20 mg per [facility]  10/11 chol 159,trig 102,hdl 52,ldl 87 on vytorin 10/20 per [facility]  2/29/12 stop vytorin, change to zocor 20 mg per [facility]  6/12 chol 152,trig 102,hdl 49,ldl 83 on zocor 20 mg  6/13 chol 150,trig 130,hdl 52,ldl 72 on zocor 20 mg  1/29/14 chol 147,trig 127,hdl 50,ldl 72 on zocor 20 mg  9/3/14 chol 169,trig 228,hdl 49,ldl 74 on zocor 20 mg  6/30/15 chol 151,trig 88,hdl 63,ldl 70 on zocor 20 mg  8/31/16 chol 169,trig 130,hdl 59,ldl 84 on zocor 20 mg  7/11/17 chol 147,trig 146,hdl 51,ldl 67 on zocor 20 mg  11/8/18 chol 189,trig 215,hdl 54,ldl 92 on zocor 20 mg  12/6/19 chol 163,trig 214,hdl 54,ldl 66 on zocor 20 mg  8/24/21 chol 166,trig 142,hdl 57,ldl 81 on zocor 20 mg  6/22/22 chol 149,trig 139,hdl 48,ldl 73 on zocor 20 mg  6/19/23 chol 153,trig 160,hdl  "51,ldl 70 on zocor 20 mg     Depression  3/12 tried cymalta for pain no benefit  12/6/13 PHQ 9 score 15, start cymbalta samples 30 mg x 7 days, then 60 mg  12/20/13 cymbalta 60 mg  1/22/15 off cymbalta    5/21/15 on cymbalta 60 mg  2/19/16 referral to behavioral health recommended he declines  11/29/16 depression screening score 0, continues on cymbalta  1/30/18 depression screening score 0 on cymbalta  8/2/21 on cymbalta screening 0 score   1/4/22 on cymbalta screening 0/3 score     Chronic opiate  12/29/22 pain contract  6/22/23 UDT  Has tried hydrocodone, NSAIDs, pregabalin, steroid injections, physical therapy, cymbalta    Patient Active Problem List   Diagnosis    S/P hip replacement    S/p lumbar surgery    Neck pain    Hypertension    Paroxysmal atrial fibrillation (HCC)    Dyslipidemia    History of squamous cell carcinoma    Preventative health care    S/P knee replacement    Morbid (severe) obesity due to excess calories ([facility])    S/p heel left surgery    History of stroke    Insomnia    Depression, major, in full remission ([facility])    Chronic pain    Knee arthritis    Opiate dependence, continuous (CMS-[facility])    S/P Maze operation for atrial fibrillation    Fibromyalgia    Impaired glucose metabolism              Patient Care Team:  Norman Peterson M.D. as PCP - General  Normandary Peterson M.D. as PCP - [facility]  Jose Martin Campbell M.D. as Consulting Physician (Anesthesiology)  Irineo Skinner M.D. as Consulting Physician (Internal Medicine Clinical Cardiac Electrophysiology)  Pete Rangel O.D. as Consulting Physician (Optometry)  Reji Thomas as [facility]       ROS           Objective     Pulse 82   Temp 36.3 °C (97.3 °F)   Resp 16   Ht 1.702 m (5' 7\")   Wt 119 kg (263 lb)   SpO2 95%   BMI 41.19 kg/m²      Physical Exam  Vitals and nursing note reviewed.   Constitutional:       General: He is not in acute distress.     Appearance: Normal appearance.   HENT:      Head: " Normocephalic and atraumatic.      Right Ear: External ear normal.      Left Ear: External ear normal.   Eyes:      Conjunctiva/sclera: Conjunctivae normal.   Cardiovascular:      Rate and Rhythm: Normal rate and regular rhythm.      Heart sounds: Normal heart sounds.   Pulmonary:      Effort: No respiratory distress.      Breath sounds: Normal breath sounds.   Abdominal:      General: There is no distension.   Musculoskeletal:         General: No swelling.   Skin:     Findings: No bruising.   Neurological:      General: No focal deficit present.      Mental Status: He is alert.   Psychiatric:         Mood and Affect: Mood normal.         Behavior: Behavior normal.                   Assessment & Plan        Assessment  #1 chronic low back pain status post lumbar surgery on Percocet 10 mg, 120 per 30 days, back pain has been stable last refill September 7    #2 chronic opiate therapy with Percocet 10 mg, 4/day, quantity 120 for 30 days no drowsiness, sedation, memory loss, depression, pain is stable and controlled without side effects allowing him to perform his ADLs    #3 hypertension not checking blood pressures regularly, remains on losartan, Dyazide, Norvasc, followed by cardiology    #4 dyslipidemia on Zocor 6/19/23 chol 153,trig 160,hdl 51,ldl 70 on zocor 20 mg    #5 paroxysmal atrial fibrillation followed by cardiology no chest pain or palpitations    #6 chronic knee pain and right knee arthritis has seen orthopedics, has had injections previously and has been offered knee replacement      #7 BMI 41.19 overweight, trying to limit processed foods and sweets, does not exercise      Plan  #1  reviewed    #2 up-to-date on urine drug screen and contract    #3 Percocet has been beneficial controlling his pain without side effects, I believe long-term benefits of chronic opiate therapy outweighs potential risks of sedation, habituation, dependence     #4 offered referral back to orthopedics he declines    #5  recommend exercise recumbent bike to help with blood pressure, cholesterol, weight, and knee arthritis    #6 Patient is asking about Ozempic, but advised the patient that Ozempic is only FDA approved for diabetes, his last A1c in June was 5.6% he does not have diabetes and his insurance will not cover Ozempic, in fact his insurance would not even cover Wegovy he could pay out-of-pocket for the Wegovy but that would be $1300 a month    #7 since he does not qualify for Wegovy again in the past option for weight loss would be regular exercise with a recumbent bike    #8 follow-up 3 months    #9 refill Percocet next few months sent to his pharmacy    #10 follow-up cardiology    #11 patient declines all vaccines      #12 check blood pressure periodically

## 2023-11-14 DIAGNOSIS — M25.561 RIGHT KNEE PAIN, UNSPECIFIED CHRONICITY: ICD-10-CM

## 2023-11-14 RX ORDER — NAPROXEN 500 MG/1
TABLET ORAL
Qty: 180 TABLET | Refills: 3 | Status: SHIPPED | OUTPATIENT
Start: 2023-11-14

## 2023-11-22 DIAGNOSIS — I10 ESSENTIAL HYPERTENSION: ICD-10-CM

## 2023-11-22 DIAGNOSIS — I48.0 PAF (PAROXYSMAL ATRIAL FIBRILLATION) (HCC): ICD-10-CM

## 2023-11-22 DIAGNOSIS — F39 MOOD DISORDER (HCC): ICD-10-CM

## 2023-11-23 RX ORDER — DULOXETIN HYDROCHLORIDE 60 MG/1
CAPSULE, DELAYED RELEASE ORAL
Qty: 90 CAPSULE | Refills: 3 | Status: SHIPPED | OUTPATIENT
Start: 2023-11-23

## 2023-11-24 DIAGNOSIS — G47.00 INSOMNIA, UNSPECIFIED TYPE: ICD-10-CM

## 2023-11-26 RX ORDER — ZOLPIDEM TARTRATE 10 MG/1
10 TABLET ORAL
Qty: 90 TABLET | Refills: 0 | Status: SHIPPED | OUTPATIENT
Start: 2023-11-26 | End: 2024-02-25 | Stop reason: SDUPTHER

## 2023-11-27 RX ORDER — TRIAMTERENE AND HYDROCHLOROTHIAZIDE 37.5; 25 MG/1; MG/1
2 CAPSULE ORAL DAILY
Qty: 200 CAPSULE | Refills: 0 | Status: SHIPPED | OUTPATIENT
Start: 2023-11-27 | End: 2024-02-12 | Stop reason: SDUPTHER

## 2023-11-27 RX ORDER — LOSARTAN POTASSIUM 100 MG/1
100 TABLET ORAL DAILY
Qty: 100 TABLET | Refills: 0 | Status: SHIPPED | OUTPATIENT
Start: 2023-11-27 | End: 2024-02-12 | Stop reason: SDUPTHER

## 2023-12-19 ENCOUNTER — OFFICE VISIT (OUTPATIENT)
Dept: MEDICAL GROUP | Facility: MEDICAL CENTER | Age: 78
End: 2023-12-19
Payer: MEDICARE

## 2023-12-19 VITALS
BODY MASS INDEX: 40.97 KG/M2 | OXYGEN SATURATION: 94 % | DIASTOLIC BLOOD PRESSURE: 82 MMHG | TEMPERATURE: 97 F | HEIGHT: 67 IN | WEIGHT: 261 LBS | SYSTOLIC BLOOD PRESSURE: 144 MMHG | HEART RATE: 108 BPM

## 2023-12-19 DIAGNOSIS — E66.01 MORBID (SEVERE) OBESITY DUE TO EXCESS CALORIES (HCC): ICD-10-CM

## 2023-12-19 DIAGNOSIS — G47.00 INSOMNIA, UNSPECIFIED TYPE: Chronic | ICD-10-CM

## 2023-12-19 DIAGNOSIS — F11.29 OPIOID DEPENDENCE WITH OPIOID-INDUCED DISORDER (HCC): ICD-10-CM

## 2023-12-19 DIAGNOSIS — M54.9 BACK PAIN, UNSPECIFIED BACK LOCATION, UNSPECIFIED BACK PAIN LATERALITY, UNSPECIFIED CHRONICITY: ICD-10-CM

## 2023-12-19 DIAGNOSIS — I10 PRIMARY HYPERTENSION: Chronic | ICD-10-CM

## 2023-12-19 DIAGNOSIS — Z00.00 PREVENTATIVE HEALTH CARE: Chronic | ICD-10-CM

## 2023-12-19 DIAGNOSIS — F33.41 RECURRENT MAJOR DEPRESSIVE DISORDER, IN PARTIAL REMISSION (HCC): Chronic | ICD-10-CM

## 2023-12-19 DIAGNOSIS — Z85.89 HISTORY OF SQUAMOUS CELL CARCINOMA: ICD-10-CM

## 2023-12-19 DIAGNOSIS — G89.29 OTHER CHRONIC PAIN: ICD-10-CM

## 2023-12-19 PROCEDURE — 3079F DIAST BP 80-89 MM HG: CPT | Performed by: INTERNAL MEDICINE

## 2023-12-19 PROCEDURE — 99214 OFFICE O/P EST MOD 30 MIN: CPT | Performed by: INTERNAL MEDICINE

## 2023-12-19 PROCEDURE — 3077F SYST BP >= 140 MM HG: CPT | Performed by: INTERNAL MEDICINE

## 2023-12-19 RX ORDER — OXYCODONE AND ACETAMINOPHEN 10; 325 MG/1; MG/1
1 TABLET ORAL EVERY 6 HOURS PRN
Qty: 120 TABLET | Refills: 0 | Status: SHIPPED | OUTPATIENT
Start: 2024-03-05 | End: 2024-03-21 | Stop reason: SDUPTHER

## 2023-12-19 RX ORDER — OXYCODONE AND ACETAMINOPHEN 10; 325 MG/1; MG/1
1 TABLET ORAL EVERY 6 HOURS PRN
Qty: 120 TABLET | Refills: 0 | Status: SHIPPED | OUTPATIENT
Start: 2024-01-05 | End: 2024-02-04

## 2023-12-19 RX ORDER — OXYCODONE AND ACETAMINOPHEN 10; 325 MG/1; MG/1
1 TABLET ORAL EVERY 6 HOURS PRN
Qty: 120 TABLET | Refills: 0 | Status: SHIPPED | OUTPATIENT
Start: 2024-02-04 | End: 2024-03-05

## 2023-12-19 ASSESSMENT — FIBROSIS 4 INDEX: FIB4 SCORE: 1.54

## 2023-12-19 NOTE — PROGRESS NOTES
Subjective     Julio Gonzalez is a 78 y.o. male who presents with med refill  Follow-Up            HPI      Patient is here with his wife, follow-up medication refill remains on Percocet 10 mg 4/day, quantity 120 per 30 days most recent refill December 6, has been on chronic opiate therapy for low back pain status post lumbar surgery 2012, has seen neurosurgery, pain management, has tried NSAIDs, Cymbalta, hydrocodone, pregabalin, seen physical therapy, has had steroid injections from pain management, had seen Dr. Mathias from pain management and I have taken over refilling his pain medications 2 years ago, has been able to taper down over the years from #150 per 30 days to his current dosing regimen.  Pain has been stable and controlled on the current regimen without drowsiness, sedation, memory loss, mood changes or depression.  Also on chronic Ambien therapy for chronic insomnia, unable to taper off that medication.  Ambien previously has not caused any drowsiness, sedation, memory loss.  Hypertension on losartan, triamterene hydrochlorothiazide, amlodipine, has been able to work on his nutrition program and has been able to lose weight.  No regular exercise.  Blood pressure at home running 135-145 systolic and diastolic less than 80.  Tries to limit salt in his diet.  Medications, allergies, medical history, surgical history, social history, family history  reviewed and updated        Current Outpatient Medications   Medication Sig Dispense Refill    losartan (COZAAR) 100 MG Tab TAKE 1 TABLET BY MOUTH DAILY 100 Tablet 0    triamterene/hctz (MAXZIDE-25/DYAZIDE) 37.5-25 MG Cap TAKE 2 CAPSULES BY MOUTH DAILY 200 Capsule 0    zolpidem (AMBIEN) 10 MG Tab Take 1 Tablet by mouth at bedtime as needed for Sleep for up to 90 days. For up to 90 days 90 Tablet 0    DULoxetine (CYMBALTA) 60 MG Cap DR Particles delayed-release capsule TAKE ONE CAPSULE BY MOUTH DAILY 90 Capsule 3    naproxen (NAPROSYN) 500 MG Tab TAKE  ONE TABLET BY MOUTH TWICE A DAY AS NEEDED FOR PAIN (GENERIC FOR NAPROSYN) 180 Tablet 3    oxyCODONE-acetaminophen (PERCOCET-10)  MG Tab Take 1 Tablet by mouth every 6 hours as needed for Severe Pain (back pain) for up to 30 days. 120 Tablet 0    amLODIPine (NORVASC) 10 MG Tab TAKE 1 TABLET BY MOUTH DAILY 90 Tablet 3    simvastatin (ZOCOR) 20 MG Tab Take 1 Tablet by mouth every evening. 90 Tablet 11    potassium chloride SA (KLOR-CON M20) 20 MEQ Tab CR TAKE TWO TABLETS BY MOUTH EVERY MORNING, ONE TABLET AT NOON, AND TWO TABLETS EVERY EVENING; (DOSING CHANGE FROM TWO TABLETS TWICE A DAY, ADDING EXTRA DOSE AT MIDDAY) 450 Tablet 3    loratadine (CLARITIN) 10 MG Tab       Multiple Vitamins-Minerals (MULTI COMPLETE PO) Take  by mouth.      aspirin (ASA) 81 MG Chew Tab chewable tablet Take 1 Tab by mouth every day. 100 Tab 11    polyethylene glycol/lytes (MIRALAX) PACK Take 17 g by mouth every day.      docusate sodium (COLACE) 100 MG CAPS Take 100 mg by mouth every day.      vitamin D (CHOLECALCIFEROL) 1000 UNIT TABS Take 6,000 Units by mouth every day.       No current facility-administered medications for this visit.                Status post lumbar surgery  2/01 MRI lumbar spine DJD L5-S1 anterolisthesis 4-5 mm marked stenosis saw  neurosurgery  9/09  neurosurgery note  12/09 neurosurgery note, severe left-sided foraminal stenosis, L5-S1 spondylolisthesis 4-5 mm recommend surgery, patient declined, has had epidural with no benefit,tried lyrica and neurontin before, declines cymbalta trial  4/10 note dr. johnson neurosurgery who gives patient norco, he is retiring, and has offered patient surgical therapy versus continuing norco 10 mg which we'll assume dispensing.  10/11 MRI lumbar spine grade 1 spondylolisthesis 9mm, bilateral spondylosis L5, moderate bilateral L5 stenosis, 2.4 cm left kidney cyst  10/11 xray LS, grade 1 spondylolisthesis L5 on S1 increased from prior exam, 1.3 cm, no  significant change in flexion  11/11  pain note right L5-S1 transforaminal epidural  1/16/12 start cymbalta trial (3/12 stop cymbalta no benefit)  1/12 dr. murphy neurosurgery note surgical intervention offered L4 to sacrum decompression  3/26/12 restarted cymbalta    9/26/12  neurosurgery operative note decompression at L5-S1and bilateral foraminotomies,transforaminal lumbar interbody fusion, L4-L5 and L5-S1, with expandable cage 8-12 mm.  2/20/13 dr. murphy neurosurgery note, lumbar films, EMG NCS lower ext inconclusive, repeat MRI lumbar spine pending  3/13/13 MRI lumbar spine postoperative changes L4-S1 lumbar laminectomy, interbody fusion, disc space insert L4-L5 L5-S1, posterior spinal fusion and fixation, L5-S1 moderate right foraminal stenosis. 2.4 cm mid right renal cyst unchanged  3/26/13 dr. murphy neurosurgery note, no further surgical intervention necessary, chronic narcotics norco 10 mg 5 per day; I will assume the norco rx from   1/25/15 off naprosyn and cymbalta; on norco 10 mg one every four hours #150  5/21/15 on percocet 10 mg five per day and on cymbalta 60 mg  3/25/16 start lyrica 50 mg tid for fibromyalgia, continue percocet 10 mg 5 per day and cymbalta 60 mg  4/12/16 increase lyrica to 100 mg tid (50 mg two tid), continue cymbalta 60 mg and percocet 10 mg 5 per day  9/26/16 off lyrica, on percocet 10 mg qid per  pain management and cymbalta  7/9/19 on percocet 10 mg #140 per 28 days from  pain management also on cymbalta 60 mg and naprosyn 500 mg bid  10/7/20  pain note on oxycodone #140 per 28 days will decrease to #112 per 28 days  11/4/20  pain note on oxycodone #112 per 28 days   10/7/21 I am taking over percocet refill, 4 per day #120 per 30 days  12/13/21 percocet 10 mg #120     Status post knee replacement left 2001     Status post hip replacement bilateral  12/03 right total hip replacement  1/04 left total hip  replacement      s/p heel surgery  12/09  left foot heel spur surgery     Preventative health  4/6/07 colonoscopy [facility] negative  8/27/15 prevnar at [facility]  12/12/19 tdap  12/12/19 pneumovax  8/2/21 declines colon  8/13/21 FIT negative  8/24/21 hep c ab negative  10/7/21 shingrix second  6/19/23 psa 3.3  6/19/23 vit d 90   9/22/23 declines all vaccines     Paroxysmal atrial fibrillation  Sees [facility]  2005 s/p maze procedure with a stroke related to that as a complication, have no records at all regarding that, no CT scan or MRI scan and probably had a cardiac catheterization by renal physicians in 2005 that was negative for coronary artery disease  8/11 [facility] note EKG NSR  5/12 [facility] note, on asa daily  11/12 [facility] note  4/29/13 cardiology note  11/3/13 cardiology note on asa  9/22/14 cardiology note sinus, follow up 6 months  5/12/15  cardiology note, paroxysmal atrial fibrillation status post maze operation, sinus rhythm  11/18/15 cardiology note stable follow one year  4/4/17  cardiology note, paroxysmal atrial fibrillation no recurrence, follow-up one year  2/5/19 cardiology note paroxysmal atrial fibrillation, sinus on exam, no recurrence of atrial fibrillation status post maze, follow-up 1 year  6/16/20 cardiology note atrial fibrillation status post maze no recurrence, hypertension stable on current regimen, work on weight loss, follow-up 6 months  7/13/21  cardiology note  1/4/22 declines overnight pulse oximetry  7/6/22 cardiology no changes   7/31/23  cardiology note stable follow up 6 months     neck pain  3/06 MRI cervical spine moderate subdural frontal stenosis C5-C6 lesser extent C6-C7     knee arthritis   9/3/14 x-ray right knee marked tricompartmental osteoarthritis, most severe medial compartment  2/19/16 has seen  orthopedics, declines knee replacement       Insomnia  2009 on ambien 10 mg  2/19/16 unable to taper ambien, referral to behavioral  sleep psychology recommended he declines  9/26/16 on ambien 10 mg work on taper, declines referral to sleep psychology  7/9/18 on ambien declines sleep management referral  7/20/20 taper ambien if possible  12/30/22 contract  6/22/23 UDT     Impaired glucose metabolism  11/8/18 A1c 6%  12/6/19 A1c 5.8%  8/24/21 A1c 5.5%  6/19/23 A1c 5.6%      Hypertension  7/10 [facility] note change from hyzaar to enalapril hct 10/25  1/11 [facility] note bp not controlled on enalapril hct 10/25, change back to losartan hct 100/25 and norvasc 10 mg  6/12 K+ 3.0, increase kdur to 20 tid, consider decreasing hctz if possible to decrease K+ supplementation  12/12 cozaar 100 mg, dyazide 37.5/25 mg, kcl 20 meq, norvasc 10 mg  1/29/14 on cozaar 100 mg, dyazide 37.5/25 mg, norvasc 10 mg, klor 20 meq bid; will increase to klor 20 meq tid  9/3//14 K+ 3.1 on klor 20 meq tid, increase to 20 meq two tab bid  9/3/14 urine mac <0.5 on cozaar 100 mg, dyazide 37.5/25 mg, norvasc 10 mg, klor 20 meq two tab bid  6/30/15 Na 132,K+ 3.5 on cozaar 100 mg, dyazide 37.5/25 mg, norvasc 10 mg, klor 20 meq two tab bid  8/31/16 K+ 3.3 on cozaar 100 mg, dyazide 37.5/25 mg, norvasc 10 mg, klor 20 meq two tab qday will increase to 2 bid  7/11/17 K+3.3 on cozaar 100 mg, dyazide 37.5/25 mg, norvasc 10 mg, klor 20 meq two bid  2/5/19 cardiology note paroxysmal atrial fibrillation, sinus on exam, no recurrence of atrial fibrillation status post maze, follow-up 1 year  12/6/19 K+3.5 on cozaar 100 mg, dyazide 37.5/25 mg, norvasc 10 mg, klor 20 meq two bid  6/16/20 cardiology note atrial fibrillation status post maze no recurrence, hypertension stable on current regimen, work on weight loss, follow-up 6 months  7/13/21  cardiology note on cozaar 100 mg, dyazide 37.5/25 mg, norvasc 10 mg, klor 20 meq two bid  8/24/21 K+ 3.4 on klor 20 meq two bid recommend increase to 2 am, 1 noon, 2 pm  6/22/22 K+ 3.4 on klor 20 meq 2 am, 1 noon, 2 pm on cozaar 100 mg, dyazide 37.5/25 mg,  norvasc 10 mg   7/6/22 cardiology note no changes   6/19/23 K+ 3.8 on klor 20 meq 2 am, 1 noon, 2 pm on cozaar 100 mg, dyazide 37.5/25 mg, norvasc 10 mg      History stroke  1999 affected right arm, no old records  Some difficulty with short recall, and occasional diff with expressing self when fatigued     History squamous cell carcinoma  Saw  dermatology offered aldara he declined  3/27/17  dermatology note  5/1/17  dermatology biopsy proven squamous cell carcinoma in situ left forehead, here for mohs  9/19/17  dermatology note continue      Fibromyalgia  3/25/16 start lyrica 50 mg tid for fibromyalgia, continue percocet 10 mg 5 per day and cymbalta 60 mg  4/12/16 increase lyrica to 100 mg tid (50 mg two tid), continue cymbalta 60 mg and percocet 10 mg 5 per day  4/28/16 increase lyrica to 150 mg tid, continue cymbalta 60 mg and percocet 10 mg five times per day  5/10/16 on lyrica 50 mg three tabs tid, change to 100 mg am, 150 mg noon, 100 mg pm     Dyslipidemia  Followed by cardiology  2/09 cholesterol 135, triglycerides 100, HDL 47, LDL 68  8/09 chol 138,trig 101,hdl 49,ldl 69  3/10 chol 123,trig 66,hdl 49,ldl 61  7/10 [facility] note change vytorin to zocor 40  11/10 chol 141,trig 75,hdl 62,ldl 64 on vytorin 10/20 mg per [facility]  10/11 chol 159,trig 102,hdl 52,ldl 87 on vytorin 10/20 per [facility]  2/29/12 stop vytorin, change to zocor 20 mg per [facility]  6/12 chol 152,trig 102,hdl 49,ldl 83 on zocor 20 mg  6/13 chol 150,trig 130,hdl 52,ldl 72 on zocor 20 mg  1/29/14 chol 147,trig 127,hdl 50,ldl 72 on zocor 20 mg  9/3/14 chol 169,trig 228,hdl 49,ldl 74 on zocor 20 mg  6/30/15 chol 151,trig 88,hdl 63,ldl 70 on zocor 20 mg  8/31/16 chol 169,trig 130,hdl 59,ldl 84 on zocor 20 mg  7/11/17 chol 147,trig 146,hdl 51,ldl 67 on zocor 20 mg  11/8/18 chol 189,trig 215,hdl 54,ldl 92 on zocor 20 mg  12/6/19 chol 163,trig 214,hdl 54,ldl 66 on zocor 20 mg  8/24/21 chol 166,trig 142,hdl 57,ldl 81 on  "zocor 20 mg  6/22/22 chol 149,trig 139,hdl 48,ldl 73 on zocor 20 mg  6/19/23 chol 153,trig 160,hdl 51,ldl 70 on zocor 20 mg     Depression  3/12 tried cymalta for pain no benefit  12/6/13 PHQ 9 score 15, start cymbalta samples 30 mg x 7 days, then 60 mg  12/20/13 cymbalta 60 mg  1/22/15 off cymbalta    5/21/15 on cymbalta 60 mg  2/19/16 referral to behavioral health recommended he declines  11/29/16 depression screening score 0, continues on cymbalta  1/30/18 depression screening score 0 on cymbalta  8/2/21 on cymbalta screening 0 score   1/4/22 on cymbalta screening 0/3 score     Chronic opiate  12/29/22 pain contract  3/23/23 urine specimen not enough for UDT  6/22/23 UDT  9/7/23 percocet refill  Has tried hydrocodone, NSAIDs, pregabalin, steroid injections, physical therapy, cymbalta    Patient Active Problem List   Diagnosis    S/P hip replacement    S/p lumbar surgery    Neck pain    Hypertension    Paroxysmal atrial fibrillation (HCC)    Dyslipidemia    History of squamous cell carcinoma    Preventative health care    S/P knee replacement    Morbid (severe) obesity due to excess calories ([facility])    S/p heel left surgery    History of stroke    Insomnia    Depression, major, in full remission ([facility])    Chronic pain    Knee arthritis    Opiate dependence, continuous (CMS-[facility])    S/P Maze operation for atrial fibrillation    Fibromyalgia    Impaired glucose metabolism                   Patient Care Team:  Normandary Peterson M.D. as PCP - General  Normandary Peterson M.D. as PCP - [facility] Paneled  Jose Martin Campbell M.D. as Consulting Physician (Anesthesiology)  Irineo Skinner M.D. as Consulting Physician (Internal Medicine Clinical Cardiac Electrophysiology)  Pete Rangel O.D. as Consulting Physician (Optometry)  Reji Thomas as [facility]        ROS           Objective     BP (!) 144/82   Pulse (!) 108   Temp 36.1 °C (97 °F) (Temporal)   Ht 1.702 m (5' 7\")   Wt 118 kg (261 lb)   SpO2 " 94%   BMI 40.88 kg/m²      Physical Exam  Vitals and nursing note reviewed.   Constitutional:       Appearance: Normal appearance.   HENT:      Head: Normocephalic and atraumatic.      Right Ear: External ear normal.      Left Ear: External ear normal.   Eyes:      Conjunctiva/sclera: Conjunctivae normal.   Cardiovascular:      Rate and Rhythm: Normal rate and regular rhythm.      Heart sounds: Normal heart sounds.   Pulmonary:      Effort: Pulmonary effort is normal.      Breath sounds: Normal breath sounds.   Abdominal:      General: There is no distension.   Musculoskeletal:         General: No swelling.   Skin:     Coloration: Skin is not jaundiced.      Findings: No bruising.   Neurological:      General: No focal deficit present.      Mental Status: He is alert.   Psychiatric:         Mood and Affect: Mood normal.         Behavior: Behavior normal.       Forehead yellowish plaque-like raised lesion circumferential, irregular borders                      Assessment & Plan        Assessment   #1 chronic low back pain status post lumbar surgery, chronic opiate therapy with Percocet 10 mg 4 times daily quantity 120 per 30 days, most recent refill December 6, pain is stable and controlled on the current regimen    #2 chronic opiate therapy Percocet 10 mg 4 times daily has seen pain management previously on the same regimen, most recent urine drug test June 22, due for opiate contract today, medication does not cause drowsiness, sedation, memory loss, depression    #3 chronic insomnia on Ambien 10 mg nightly unable to taper no drowsiness, sedation, memory loss with Ambien in conjunction with Percocet, no alcohol with medications, unable to taper off the medication    #4 chronic sedative hypnotic therapy with Ambien    #5 hypertension, on losartan, amlodipine, Dyazide, systolic blood pressure still running a bit high, he has been able to lose weight following good nutrition program, no current exercise    #6  dyslipidemia on simvastatin most recent lipid panel in June total cholesterol 153, HDL 51, LDL 70    #7 history of depression on Cymbalta for remission stable no mood changes    #8 history of squamous cell carcinoma, yellowish benign-appearing plaque forehead    #9 BMI 40.88 overweight, working on nutrition program he has been able to lose weight    Plan  #1     #2 refill Percocet 10 mg quantity 4/day, 120 per 30 days, prescription for 3 consecutive months sent to his pharmacy, medication can cause drowsiness, habituation, dependence, depression, pain is stable and controlled and I believe chronic Percocet therapy is beneficial and benefits outweighs risk, having seen pain management    #3 chronic sedative hypnotic therapy with Ambien, long-term risk of memory loss and dementia discussed previously    #4 chronic controlled substance contract signed    #5 follow-up 3 months    #6 declines all vaccines    #7 recommend follow-up dermatology for annual skin check, he declines    #8 continue work on a good nutrition program limiting sweets, candies, processed foods    #9 have recommended regular exercise program previously    #10 recommend addition of beta-blocker to achieve better blood pressure control, he declines, long-term elevated blood pressure increases risk for cardiac disease, heart attack, stroke    #11 continue check blood pressure regularly    #12 follow-up cardiology    #13 continue Cymbalta

## 2024-01-28 ENCOUNTER — TELEPHONE (OUTPATIENT)
Dept: MEDICAL GROUP | Facility: MEDICAL CENTER | Age: 79
End: 2024-01-28
Payer: MEDICARE

## 2024-02-12 DIAGNOSIS — I48.0 PAF (PAROXYSMAL ATRIAL FIBRILLATION) (HCC): ICD-10-CM

## 2024-02-12 DIAGNOSIS — I10 ESSENTIAL HYPERTENSION: ICD-10-CM

## 2024-02-12 RX ORDER — LOSARTAN POTASSIUM 100 MG/1
100 TABLET ORAL DAILY
Qty: 100 TABLET | Refills: 1 | Status: SHIPPED | OUTPATIENT
Start: 2024-02-12

## 2024-02-12 RX ORDER — TRIAMTERENE AND HYDROCHLOROTHIAZIDE 37.5; 25 MG/1; MG/1
2 CAPSULE ORAL DAILY
Qty: 200 CAPSULE | Refills: 1 | Status: SHIPPED | OUTPATIENT
Start: 2024-02-12

## 2024-02-12 NOTE — TELEPHONE ENCOUNTER
Received request via: Patient    Was the patient seen in the last year in this department? Yes    Does the patient have an active prescription (recently filled or refills available) for medication(s) requested? No    Pharmacy Name: [facility]    Does the patient have half-way Plus and need 100 day supply (blood pressure, diabetes and cholesterol meds only)? Yes, quantity updated to 100 days

## 2024-02-24 ENCOUNTER — PATIENT MESSAGE (OUTPATIENT)
Dept: CARDIOLOGY | Facility: MEDICAL CENTER | Age: 79
End: 2024-02-24
Payer: MEDICARE

## 2024-02-24 DIAGNOSIS — G47.00 INSOMNIA, UNSPECIFIED TYPE: ICD-10-CM

## 2024-02-24 DIAGNOSIS — E78.5 DYSLIPIDEMIA: ICD-10-CM

## 2024-02-24 NOTE — TELEPHONE ENCOUNTER
ZOLPIDEM TARTRATE 10MG TABS     Received request via: Pharmacy    Was the patient seen in the last year in this department? Yes    Does the patient have an active prescription (recently filled or refills available) for medication(s) requested? No    Pharmacy Name: [id number] - [place] [address] [facility] [place]     Does the patient have [facility] and need 100 day supply (blood pressure, diabetes and cholesterol meds only)? Yes, quantity updated to 100 days    Not for blood pressure, diabetes and cholesterol meds only

## 2024-02-25 DIAGNOSIS — I10 ESSENTIAL HYPERTENSION: ICD-10-CM

## 2024-02-25 DIAGNOSIS — I48.0 PAF (PAROXYSMAL ATRIAL FIBRILLATION) (HCC): ICD-10-CM

## 2024-02-25 DIAGNOSIS — G47.00 INSOMNIA, UNSPECIFIED TYPE: ICD-10-CM

## 2024-02-25 RX ORDER — ZOLPIDEM TARTRATE 10 MG/1
10 TABLET ORAL
Qty: 90 TABLET | Refills: 0 | OUTPATIENT
Start: 2024-02-25

## 2024-02-25 RX ORDER — ZOLPIDEM TARTRATE 10 MG/1
10 TABLET ORAL
Qty: 90 TABLET | Refills: 0 | Status: SHIPPED | OUTPATIENT
Start: 2024-02-26 | End: 2024-05-26

## 2024-02-26 RX ORDER — SIMVASTATIN 20 MG
20 TABLET ORAL NIGHTLY
Qty: 90 TABLET | Refills: 1 | Status: SHIPPED | OUTPATIENT
Start: 2024-02-26 | End: 2024-03-04 | Stop reason: SDUPTHER

## 2024-03-04 DIAGNOSIS — E78.5 DYSLIPIDEMIA: ICD-10-CM

## 2024-03-04 NOTE — TELEPHONE ENCOUNTER
Is the patient due for a refill? Yes    Was the patient seen the past year? Yes    Date of last office visit: 7/31/23    Does the patient have an upcoming appointment?  No    Provider to refill:DS    Does the patients insurance require a 100 day supply?  Yes

## 2024-03-05 RX ORDER — SIMVASTATIN 20 MG
20 TABLET ORAL NIGHTLY
Qty: 100 TABLET | Refills: 1 | Status: SHIPPED | OUTPATIENT
Start: 2024-03-05

## 2024-03-21 ENCOUNTER — OFFICE VISIT (OUTPATIENT)
Dept: MEDICAL GROUP | Facility: MEDICAL CENTER | Age: 79
End: 2024-03-21
Payer: MEDICARE

## 2024-03-21 VITALS
BODY MASS INDEX: 38.36 KG/M2 | WEIGHT: 259 LBS | RESPIRATION RATE: 16 BRPM | SYSTOLIC BLOOD PRESSURE: 168 MMHG | TEMPERATURE: 98.1 F | HEIGHT: 69 IN | OXYGEN SATURATION: 95 % | DIASTOLIC BLOOD PRESSURE: 72 MMHG | HEART RATE: 71 BPM

## 2024-03-21 DIAGNOSIS — E78.5 DYSLIPIDEMIA: Chronic | ICD-10-CM

## 2024-03-21 DIAGNOSIS — M54.9 BACK PAIN, UNSPECIFIED BACK LOCATION, UNSPECIFIED BACK PAIN LATERALITY, UNSPECIFIED CHRONICITY: ICD-10-CM

## 2024-03-21 DIAGNOSIS — I48.0 PAROXYSMAL ATRIAL FIBRILLATION (HCC): ICD-10-CM

## 2024-03-21 DIAGNOSIS — R73.09 IMPAIRED GLUCOSE METABOLISM: ICD-10-CM

## 2024-03-21 DIAGNOSIS — E55.9 VITAMIN D DEFICIENCY: ICD-10-CM

## 2024-03-21 DIAGNOSIS — Z12.5 PROSTATE CANCER SCREENING: ICD-10-CM

## 2024-03-21 DIAGNOSIS — G89.29 OTHER CHRONIC PAIN: ICD-10-CM

## 2024-03-21 DIAGNOSIS — F11.20 OPIATE DEPENDENCE, CONTINUOUS (HCC): ICD-10-CM

## 2024-03-21 DIAGNOSIS — M17.11 ARTHRITIS OF KNEE, RIGHT: ICD-10-CM

## 2024-03-21 DIAGNOSIS — I10 PRIMARY HYPERTENSION: Chronic | ICD-10-CM

## 2024-03-21 DIAGNOSIS — G47.00 INSOMNIA, UNSPECIFIED TYPE: Chronic | ICD-10-CM

## 2024-03-21 DIAGNOSIS — F33.41 RECURRENT MAJOR DEPRESSIVE DISORDER, IN PARTIAL REMISSION (HCC): ICD-10-CM

## 2024-03-21 DIAGNOSIS — E66.01 MORBID (SEVERE) OBESITY DUE TO EXCESS CALORIES (HCC): ICD-10-CM

## 2024-03-21 PROCEDURE — 3077F SYST BP >= 140 MM HG: CPT | Performed by: INTERNAL MEDICINE

## 2024-03-21 PROCEDURE — 99214 OFFICE O/P EST MOD 30 MIN: CPT | Performed by: INTERNAL MEDICINE

## 2024-03-21 PROCEDURE — 3078F DIAST BP <80 MM HG: CPT | Performed by: INTERNAL MEDICINE

## 2024-03-21 RX ORDER — OXYCODONE AND ACETAMINOPHEN 10; 325 MG/1; MG/1
1 TABLET ORAL EVERY 6 HOURS PRN
Qty: 120 TABLET | Refills: 0 | Status: SHIPPED | OUTPATIENT
Start: 2024-05-03 | End: 2024-03-23

## 2024-03-21 RX ORDER — OXYCODONE AND ACETAMINOPHEN 10; 325 MG/1; MG/1
1 TABLET ORAL EVERY 6 HOURS PRN
Qty: 120 TABLET | Refills: 0 | Status: SHIPPED | OUTPATIENT
Start: 2024-05-04 | End: 2024-06-03

## 2024-03-21 RX ORDER — OXYCODONE AND ACETAMINOPHEN 10; 325 MG/1; MG/1
1 TABLET ORAL EVERY 6 HOURS PRN
Qty: 120 TABLET | Refills: 0 | Status: SHIPPED | OUTPATIENT
Start: 2024-04-04 | End: 2024-05-04

## 2024-03-21 ASSESSMENT — PATIENT HEALTH QUESTIONNAIRE - PHQ9: CLINICAL INTERPRETATION OF PHQ2 SCORE: 0

## 2024-03-21 ASSESSMENT — FIBROSIS 4 INDEX: FIB4 SCORE: 1.54

## 2024-03-21 NOTE — PROGRESS NOTES
Subjective     Julio Gonzalez is a 78 y.o. male who presents with med refill Follow-Up            HPI      Here for Percocet refill, currently taking 10 mg 4 times a day quantity 120 tablets per 30 days. Most recent Percocet refill March 5 quantity 120 per 30 days, current MME average 60, for chronic back, hip, leg pain.  Previously patient has seen pain management, status post lumbar surgery 2012 L5-S1 decompression, bilateral foraminotomy L4-S1, was seeing Dr. Mathias from pain management, has been up to Percocet 10 mg quantity 140 per 28 days and has gradually been able to taper down to Percocet 120 tablets per 30 days, also on naproxen once or twice per week as needed, taking only 1 tablet a day with no GI upset.  Naprosyn does not cause stomach pain.  Percocet controls his pain allowing him to perform his ADLs.  The Percocet does not cause drowsiness, sedation, memory loss, depression, or constipation.  Mood has been stable on Cymbalta, no worsening anxiety or depression over the winter months.  Good social support with his wife.  Remains on zolpidem nightly for chronic insomnia 10 mg, unable to taper off the medication, does not cause any daytime drowsiness or somnolence or hangover effect with the zolpidem, has declined sleep psychology referral in the past.  Most recent controlled substance contract December 19, urine drug screen June 22.  No alcohol with the zolpidem or Percocet.  Sees cardiology for paroxysmal atrial fibrillation, hypertension, checks blood pressure daily, normally on losartan, Dyazide, amlodipine, potassium, did not take Dyazide this morning as he does not like to take that when he leaves the house.  Home blood pressures running from January through March 1, 2020 [id number], wife checks his blood pressure regularly.  Tries to eat low-sodium diet, limiting sweets, candies, processed foods in his regimen.  Does eat some microwave tenderness but low-sodium.  No regular exercise because of  chronic back pain.  Remains on simvastatin for cholesterol.  Follows up with cardiology every 6 to 12 months, last seen by cardiology June of last year.        Current Outpatient Medications   Medication Sig Dispense Refill    simvastatin (ZOCOR) 20 MG Tab Take 1 Tablet by mouth every evening. 100 Tablet 1    zolpidem (AMBIEN) 10 MG Tab Take 1 Tablet by mouth at bedtime as needed for Sleep for up to 90 days. For up to 90 days 90 Tablet 0    losartan (COZAAR) 100 MG Tab Take 1 Tablet by mouth every day. 100 Tablet 1    triamterene/hctz (MAXZIDE-25/DYAZIDE) 37.5-25 MG Cap Take 2 Capsules by mouth every day. 200 Capsule 1    oxyCODONE-acetaminophen (PERCOCET-10)  MG Tab Take 1 Tablet by mouth every 6 hours as needed for Severe Pain (back pain) for up to 30 days. 120 Tablet 0    DULoxetine (CYMBALTA) 60 MG Cap DR Particles delayed-release capsule TAKE ONE CAPSULE BY MOUTH DAILY 90 Capsule 3    naproxen (NAPROSYN) 500 MG Tab TAKE ONE TABLET BY MOUTH TWICE A DAY AS NEEDED FOR PAIN (GENERIC FOR NAPROSYN) 180 Tablet 3    amLODIPine (NORVASC) 10 MG Tab TAKE 1 TABLET BY MOUTH DAILY 90 Tablet 3    potassium chloride SA (KLOR-CON M20) 20 MEQ Tab CR TAKE TWO TABLETS BY MOUTH EVERY MORNING, ONE TABLET AT NOON, AND TWO TABLETS EVERY EVENING; (DOSING CHANGE FROM TWO TABLETS TWICE A DAY, ADDING EXTRA DOSE AT MIDDAY) 450 Tablet 3    loratadine (CLARITIN) 10 MG Tab       Multiple Vitamins-Minerals (MULTI COMPLETE PO) Take  by mouth.      aspirin (ASA) 81 MG Chew Tab chewable tablet Take 1 Tab by mouth every day. 100 Tab 11    polyethylene glycol/lytes (MIRALAX) PACK Take 17 g by mouth every day.      docusate sodium (COLACE) 100 MG CAPS Take 100 mg by mouth every day.      vitamin D (CHOLECALCIFEROL) 1000 UNIT TABS Take 6,000 Units by mouth every day.       No current facility-administered medications for this visit.            Status post lumbar surgery  2/01 MRI lumbar spine DJD L5-S1 anterolisthesis 4-5 mm marked stenosis saw   neurosurgery  9/09  neurosurgery note  12/09 neurosurgery note, severe left-sided foraminal stenosis, L5-S1 spondylolisthesis 4-5 mm recommend surgery, patient declined, has had epidural with no benefit,tried lyrica and neurontin before, declines cymbalta trial  4/10 note dr. johnson neurosurgery who gives patient norco, he is retiring, and has offered patient surgical therapy versus continuing norco 10 mg which we'll assume dispensing.  10/11 MRI lumbar spine grade 1 spondylolisthesis 9mm, bilateral spondylosis L5, moderate bilateral L5 stenosis, 2.4 cm left kidney cyst  10/11 xray LS, grade 1 spondylolisthesis L5 on S1 increased from prior exam, 1.3 cm, no significant change in flexion  11/11  pain note right L5-S1 transforaminal epidural  1/16/12 start cymbalta trial (3/12 stop cymbalta no benefit)  1/12 dr. murphy neurosurgery note surgical intervention offered L4 to sacrum decompression  3/26/12 restarted cymbalta    9/26/12  neurosurgery operative note decompression at L5-S1and bilateral foraminotomies,transforaminal lumbar interbody fusion, L4-L5 and L5-S1, with expandable cage 8-12 mm.  2/20/13 dr. murphy neurosurgery note, lumbar films, EMG NCS lower ext inconclusive, repeat MRI lumbar spine pending  3/13/13 MRI lumbar spine postoperative changes L4-S1 lumbar laminectomy, interbody fusion, disc space insert L4-L5 L5-S1, posterior spinal fusion and fixation, L5-S1 moderate right foraminal stenosis. 2.4 cm mid right renal cyst unchanged  3/26/13 dr. murphy neurosurgery note, no further surgical intervention necessary, chronic narcotics norco 10 mg 5 per day; I will assume the norco rx from   1/25/15 off naprosyn and cymbalta; on norco 10 mg one every four hours #150  5/21/15 on percocet 10 mg five per day and on cymbalta 60 mg  3/25/16 start lyrica 50 mg tid for fibromyalgia, continue percocet 10 mg 5 per day and cymbalta 60 mg  4/12/16 increase lyrica to 100 mg  tid (50 mg two tid), continue cymbalta 60 mg and percocet 10 mg 5 per day  9/26/16 off lyrica, on percocet 10 mg qid per  pain management and cymbalta  7/9/19 on percocet 10 mg #140 per 28 days from  pain management also on cymbalta 60 mg and naprosyn 500 mg bid  10/7/20  pain note on oxycodone #140 per 28 days will decrease to #112 per 28 days  11/4/20  pain note on oxycodone #112 per 28 days   10/7/21 I am taking over percocet refill, 4 per day #120 per 30 days  12/13/21 percocet 10 mg #120     Status post knee replacement left 2001     Status post hip replacement bilateral  12/03 right total hip replacement  1/04 left total hip replacement      s/p heel surgery  12/09  left foot heel spur surgery     Preventative health  4/6/07 colonoscopy [facility] negative  8/27/15 [facility]nar [facility]  12/12/19 tdap  12/12/19 pneumovax  8/2/21 declines colon  8/13/21 FIT negative  8/24/21 hep c ab negative  10/7/21 shingrix second  6/19/23 psa 3.3  6/19/23 vit d 90   12/19/23 declines all vaccines     Paroxysmal atrial fibrillation  Sees [facility]  2005 s/p maze procedure with a stroke related to that as a complication, have no records at all regarding that, no CT scan or MRI scan and probably had a cardiac catheterization by renal physicians in 2005 that was negative for coronary artery disease  8/11 [facility] note EKG NSR  5/12 [facility] note, on asa daily  11/12 [facility] note  4/29/13 cardiology note  11/3/13 cardiology note on asa  9/22/14 cardiology note sinus, follow up 6 months  5/12/15  cardiology note, paroxysmal atrial fibrillation status post maze operation, sinus rhythm  11/18/15 cardiology note stable follow one year  4/4/17  cardiology note, paroxysmal atrial fibrillation no recurrence, follow-up one year  2/5/19 cardiology note paroxysmal atrial fibrillation, sinus on exam, no recurrence of atrial fibrillation status post maze, follow-up 1 year  6/16/20 cardiology note  atrial fibrillation status post maze no recurrence, hypertension stable on current regimen, work on weight loss, follow-up 6 months  7/13/21  cardiology note  1/4/22 declines overnight pulse oximetry  7/6/22 cardiology no changes   7/31/23  cardiology note stable follow up 6 months     neck pain  3/06 MRI cervical spine moderate subdural frontal stenosis C5-C6 lesser extent C6-C7     knee arthritis   9/3/14 x-ray right knee marked tricompartmental osteoarthritis, most severe medial compartment  2/19/16 has seen  orthopedics, declines knee replacement       Insomnia  2009 on ambien 10 mg  2/19/16 unable to taper ambien, referral to behavioral sleep psychology recommended he declines  9/26/16 on ambien 10 mg work on taper, declines referral to sleep psychology  7/9/18 on ambien declines sleep management referral  7/20/20 taper ambien if possible  12/30/22 contract  6/22/23 UDT     Impaired glucose metabolism  11/8/18 A1c 6%  12/6/19 A1c 5.8%  8/24/21 A1c 5.5%  6/19/23 A1c 5.6%      Hypertension  7/10 [facility] note change from hyzaar to enalapril hct 10/25  1/11 [facility] note bp not controlled on enalapril hct 10/25, change back to losartan hct 100/25 and norvasc 10 mg  6/12 K+ 3.0, increase kdur to 20 tid, consider decreasing hctz if possible to decrease K+ supplementation  12/12 cozaar 100 mg, dyazide 37.5/25 mg, kcl 20 meq, norvasc 10 mg  1/29/14 on cozaar 100 mg, dyazide 37.5/25 mg, norvasc 10 mg, klor 20 meq bid; will increase to klor 20 meq tid  9/3//14 K+ 3.1 on klor 20 meq tid, increase to 20 meq two tab bid  9/3/14 urine mac <0.5 on cozaar 100 mg, dyazide 37.5/25 mg, norvasc 10 mg, klor 20 meq two tab bid  6/30/15 Na 132,K+ 3.5 on cozaar 100 mg, dyazide 37.5/25 mg, norvasc 10 mg, klor 20 meq two tab bid  8/31/16 K+ 3.3 on cozaar 100 mg, dyazide 37.5/25 mg, norvasc 10 mg, klor 20 meq two tab qday will increase to 2 bid  7/11/17 K+3.3 on cozaar 100 mg, dyazide 37.5/25 mg, norvasc 10 mg, klor 20  meq two bid  2/5/19 cardiology note paroxysmal atrial fibrillation, sinus on exam, no recurrence of atrial fibrillation status post maze, follow-up 1 year  12/6/19 K+3.5 on cozaar 100 mg, dyazide 37.5/25 mg, norvasc 10 mg, klor 20 meq two bid  6/16/20 cardiology note atrial fibrillation status post maze no recurrence, hypertension stable on current regimen, work on weight loss, follow-up 6 months  7/13/21  cardiology note on cozaar 100 mg, dyazide 37.5/25 mg, norvasc 10 mg, klor 20 meq two bid  8/24/21 K+ 3.4 on klor 20 meq two bid recommend increase to 2 am, 1 noon, 2 pm  6/22/22 K+ 3.4 on klor 20 meq 2 am, 1 noon, 2 pm on cozaar 100 mg, dyazide 37.5/25 mg, norvasc 10 mg   7/6/22 cardiology note no changes   6/19/23 K+ 3.8 on klor 20 meq 2 am, 1 noon, 2 pm on cozaar 100 mg, dyazide 37.5/25 mg, norvasc 10 mg   12/8/23 sbp running 135-145 recommend add coreg he declines     History stroke  1999 affected right arm, no old records  Some difficulty with short recall, and occasional diff with expressing self when fatigued     History squamous cell carcinoma  Saw  dermatology offered aldara he declined  3/27/17  dermatology note  5/1/17  dermatology biopsy proven squamous cell carcinoma in situ left forehead, here for mohs  9/19/17  dermatology note continue   12/19/23 declines dermatology follow up     Fibromyalgia  3/25/16 start lyrica 50 mg tid for fibromyalgia, continue percocet 10 mg 5 per day and cymbalta 60 mg  4/12/16 increase lyrica to 100 mg tid (50 mg two tid), continue cymbalta 60 mg and percocet 10 mg 5 per day  4/28/16 increase lyrica to 150 mg tid, continue cymbalta 60 mg and percocet 10 mg five times per day  5/10/16 on lyrica 50 mg three tabs tid, change to 100 mg am, 150 mg noon, 100 mg pm     Dyslipidemia  Followed by cardiology  2/09 cholesterol 135, triglycerides 100, HDL 47, LDL 68  8/09 chol 138,trig 101,hdl 49,ldl 69  3/10 chol 123,trig 66,hdl  49,ldl 61  7/10 [facility] note change vytorin to zocor 40  11/10 chol 141,trig 75,hdl 62,ldl 64 on vytorin 10/20 mg per [facility]  10/11 chol 159,trig 102,hdl 52,ldl 87 on vytorin 10/20 per [facility]  2/29/12 stop vytorin, change to zocor 20 mg per [facility]  6/12 chol 152,trig 102,hdl 49,ldl 83 on zocor 20 mg  6/13 chol 150,trig 130,hdl 52,ldl 72 on zocor 20 mg  1/29/14 chol 147,trig 127,hdl 50,ldl 72 on zocor 20 mg  9/3/14 chol 169,trig 228,hdl 49,ldl 74 on zocor 20 mg  6/30/15 chol 151,trig 88,hdl 63,ldl 70 on zocor 20 mg  8/31/16 chol 169,trig 130,hdl 59,ldl 84 on zocor 20 mg  7/11/17 chol 147,trig 146,hdl 51,ldl 67 on zocor 20 mg  11/8/18 chol 189,trig 215,hdl 54,ldl 92 on zocor 20 mg  12/6/19 chol 163,trig 214,hdl 54,ldl 66 on zocor 20 mg  8/24/21 chol 166,trig 142,hdl 57,ldl 81 on zocor 20 mg  6/22/22 chol 149,trig 139,hdl 48,ldl 73 on zocor 20 mg  6/19/23 chol 153,trig 160,hdl 51,ldl 70 on zocor 20 mg     Depression  3/12 tried cymalta for pain no benefit  12/6/13 PHQ 9 score 15, start cymbalta samples 30 mg x 7 days, then 60 mg  12/20/13 cymbalta 60 mg  1/22/15 off cymbalta    5/21/15 on cymbalta 60 mg  2/19/16 referral to behavioral health recommended he declines  11/29/16 depression screening score 0, continues on cymbalta  1/30/18 depression screening score 0 on cymbalta  8/2/21 on cymbalta screening 0 score   1/4/22 on cymbalta screening 0/3 score  3/16/24 on cymbalta     Chronic opiate  12/29/22 pain contract  3/23/23 urine specimen not enough for UDT  6/22/23 UDT  12/19/23 contract  1/5/23 percocet  Has tried hydrocodone, NSAIDs, pregabalin, steroid injections, physical therapy, cymbalta    Patient Active Problem List   Diagnosis    S/P hip replacement    S/p lumbar surgery    Neck pain    Hypertension    Paroxysmal atrial fibrillation (HCC)    Dyslipidemia    History of squamous cell carcinoma    Preventative health care    S/P knee replacement    Morbid (severe) obesity due to excess calories (HCC)    S/p heel left surgery  "   History of stroke    Insomnia    Depression, major, in full remission ([facility])    Chronic pain    Knee arthritis    Opiate dependence, continuous ([facility])    S/P Maze operation for atrial fibrillation    Fibromyalgia    Impaired glucose metabolism                Depression Screening  Little interest or pleasure in doing things?  0 - not at all  Feeling down, depressed , or hopeless? 0 - not at all  Patient Health Questionnaire Score: 0          Fall Risk Assessment  Has the patient had two or more falls in the last year or any fall with injury in the last year?  No                 Patient Care Team:  Norman Peterson M.D. as PCP - General  Normandary Peterson M.D. as PCP - H Paneled  Jose Martin Campbell M.D. as Consulting Physician (Anesthesiology)  Irineo Skinner M.D. as Consulting Physician (Internal Medicine Clinical Cardiac Electrophysiology)  Pete Rangel O.D. as Consulting Physician (Optometry)  Reji Thomas as [facility]       ROS           Objective     BP (!) 168/72 (BP Location: Left arm, Patient Position: Sitting, BP Cuff Size: Large adult)   Pulse 71   Temp 36.7 °C (98.1 °F)   Resp 16   Ht 1.753 m (5' 9\")   Wt 117 kg (259 lb)   SpO2 95%   BMI 38.25 kg/m²      Physical Exam  Vitals and nursing note reviewed.   Constitutional:       Appearance: Normal appearance.   HENT:      Head: Normocephalic and atraumatic.      Right Ear: External ear normal.      Left Ear: External ear normal.   Eyes:      Conjunctiva/sclera: Conjunctivae normal.   Cardiovascular:      Rate and Rhythm: Normal rate and regular rhythm.      Heart sounds: Normal heart sounds.   Pulmonary:      Effort: Pulmonary effort is normal.      Breath sounds: Normal breath sounds.   Abdominal:      General: There is no distension.   Musculoskeletal:         General: No swelling.      Cervical back: Neck supple.   Skin:     Coloration: Skin is not jaundiced.      Findings: No bruising.   Neurological:      " General: No focal deficit present.      Mental Status: He is alert.   Psychiatric:         Mood and Affect: Mood normal.         Behavior: Behavior normal.     Patient is here with his wife medication refill.                        Assessment & Plan   Assessment  #1 chronic low back pain status post lumbar surgery, currently on Percocet 10 mg quantity 120 per 30 days, also supplement with naproxen once or twice a week 1 tablet at a time, has tried hydrocodone previously, other NSAIDs, pregabalin, Cymbalta, has seen physical therapy, pain management, neurosurgery, back pain is stable and controlled on the Percocet allow him to perform his ADLs without significant side effects, morphine equivalent 60 which has been stable and decreased over time as he has been able to taper down the quantity of Percocet    #2 chronic opiate therapy Percocet 10 mg quantity 120 per 30 days most recent refill March 5, has been consistent with his prescription refills, no drowsiness, sedation, memory loss, depression, constipation, has seen pain management previously and I have resumed prescribe the prescriptions for him since his pain medication dosing has been stable, no alcohol with the medication, good social support with his wife    #3 hypertension elevated blood pressure today but did not take his Dyazide, blood pressures at home indicate good and stable blood pressure control running 120s over 70s, no regular exercise because of back pain, tries to limit salt intake and processed foods, remains on amlodipine 10 mg, Dyazide, potassium,    #4 dyslipidemia on Zocor    #5 paroxysmal atrial fibrillation sinus on exam today followed by cardiology, status post Maze procedure, aspirin daily     #6 BMI 38.25, has lost some weight from last year, has declined weight management program and weight loss medication, no regular exercise but is trying to limit sweets, candies, processed foods in his diet    #7 chronic insomnia on Ambien 10 mg  nightly unable to taper, denies mood changes, memory loss, has declined sleep psychology evaluation, no alcohol     #8 vitamin D deficiency    #9 impaired glucose metabolism          Plan   #!  reviewed and consistent    #2 continue Percocet 10 mg quantity 120 per 30 days, pain is stable and controlled on this regimen, has seen pain management and he has been able to taper down his quantity to 120 per 30 days, has seen neurosurgery status post lumbar surgery, tried physical therapy, has had imaging of his back, has tried NSAIDs, gabapentin, Cymbalta, hydrocodone, has had injections and is stable on his current dosing regimen without side effects, continue alcohol    #3 recommend working on tapering Ambien as long-term that medication is increased risk for memory loss, dementia, depression, do not recommend other sedative hypnotics, we have recommended sleep psychology in the past but he has declined, continue to optimize sleep hygiene, continue no alcohol    #4 check blood pressure daily, continue low-sodium diet, try to keep get some activity if possible, since blood pressures at home have been stable and he did not take Dyazide this morning his blood pressure elevated today no changes with his blood pressure medication since home blood pressure readings have been good over the past 3 months    #5 follow-up cardiology, continue to check blood pressure, heart rate, monitor for recurrent atrial fibrillation symptoms such as palpitations, lightheadedness, chest pain    #6 Labs    #7 refill Percocet subsequent 3 months sent to his pharmacy understanding long-term side effects of habituation, dependence, memory loss, depression, I believe medication currently provides benefit and benefits outweigh risks    #8 urine drug screen, up-to-date on contract    #9 follow-up 3 months

## 2024-03-25 DIAGNOSIS — M54.9 BACK PAIN, UNSPECIFIED BACK LOCATION, UNSPECIFIED BACK PAIN LATERALITY, UNSPECIFIED CHRONICITY: ICD-10-CM

## 2024-03-25 RX ORDER — OXYCODONE AND ACETAMINOPHEN 10; 325 MG/1; MG/1
1 TABLET ORAL EVERY 6 HOURS PRN
Qty: 120 TABLET | Refills: 0 | Status: SHIPPED | OUTPATIENT
Start: 2024-06-03 | End: 2024-07-03

## 2024-04-11 ENCOUNTER — TELEPHONE (OUTPATIENT)
Dept: HEALTH INFORMATION MANAGEMENT | Facility: OTHER | Age: 79
End: 2024-04-11
Payer: MEDICARE

## 2024-05-25 DIAGNOSIS — G47.00 INSOMNIA, UNSPECIFIED TYPE: ICD-10-CM

## 2024-05-26 DIAGNOSIS — G47.00 INSOMNIA, UNSPECIFIED TYPE: ICD-10-CM

## 2024-05-26 RX ORDER — ZOLPIDEM TARTRATE 10 MG/1
10 TABLET ORAL
Qty: 90 TABLET | Refills: 0 | Status: SHIPPED | OUTPATIENT
Start: 2024-05-26 | End: 2024-08-24

## 2024-05-26 RX ORDER — ZOLPIDEM TARTRATE 10 MG/1
10 TABLET ORAL
Qty: 90 TABLET | Refills: 0 | OUTPATIENT
Start: 2024-05-26

## 2024-06-11 DIAGNOSIS — I10 HYPERTENSION, UNSPECIFIED TYPE: ICD-10-CM

## 2024-06-11 RX ORDER — POTASSIUM CHLORIDE 20 MEQ/1
TABLET, EXTENDED RELEASE ORAL
Qty: 450 TABLET | Refills: 0 | Status: SHIPPED | OUTPATIENT
Start: 2024-06-11

## 2024-06-16 DIAGNOSIS — I10 HYPERTENSION, UNSPECIFIED TYPE: ICD-10-CM

## 2024-06-16 DIAGNOSIS — G89.29 OTHER CHRONIC PAIN: ICD-10-CM

## 2024-06-24 ENCOUNTER — HOSPITAL ENCOUNTER (OUTPATIENT)
Dept: LAB | Facility: MEDICAL CENTER | Age: 79
End: 2024-06-24
Attending: INTERNAL MEDICINE
Payer: MEDICARE

## 2024-06-24 DIAGNOSIS — I10 PRIMARY HYPERTENSION: Chronic | ICD-10-CM

## 2024-06-24 DIAGNOSIS — Z12.5 PROSTATE CANCER SCREENING: ICD-10-CM

## 2024-06-24 DIAGNOSIS — E55.9 VITAMIN D DEFICIENCY: ICD-10-CM

## 2024-06-24 DIAGNOSIS — E78.5 DYSLIPIDEMIA: Chronic | ICD-10-CM

## 2024-06-24 DIAGNOSIS — R73.09 IMPAIRED GLUCOSE METABOLISM: ICD-10-CM

## 2024-06-24 LAB
25(OH)D3 SERPL-MCNC: 87 NG/ML (ref 30–100)
ALBUMIN SERPL BCP-MCNC: 4.2 G/DL (ref 3.2–4.9)
ALBUMIN/GLOB SERPL: 1.2 G/DL
ALP SERPL-CCNC: 80 U/L (ref 30–99)
ALT SERPL-CCNC: 13 U/L (ref 2–50)
ANION GAP SERPL CALC-SCNC: 13 MMOL/L (ref 7–16)
APPEARANCE UR: CLEAR
AST SERPL-CCNC: 20 U/L (ref 12–45)
BASOPHILS # BLD AUTO: 0.6 % (ref 0–1.8)
BASOPHILS # BLD: 0.05 K/UL (ref 0–0.12)
BILIRUB SERPL-MCNC: 0.6 MG/DL (ref 0.1–1.5)
BILIRUB UR QL STRIP.AUTO: NEGATIVE
BUN SERPL-MCNC: 19 MG/DL (ref 8–22)
CALCIUM ALBUM COR SERPL-MCNC: 9.7 MG/DL (ref 8.5–10.5)
CALCIUM SERPL-MCNC: 9.9 MG/DL (ref 8.4–10.2)
CHLORIDE SERPL-SCNC: 98 MMOL/L (ref 96–112)
CHOLEST SERPL-MCNC: 162 MG/DL (ref 100–199)
CO2 SERPL-SCNC: 26 MMOL/L (ref 20–33)
COLOR UR: YELLOW
CREAT SERPL-MCNC: 0.86 MG/DL (ref 0.5–1.4)
CREAT UR-MCNC: 58.98 MG/DL
EOSINOPHIL # BLD AUTO: 0.2 K/UL (ref 0–0.51)
EOSINOPHIL NFR BLD: 2.3 % (ref 0–6.9)
ERYTHROCYTE [DISTWIDTH] IN BLOOD BY AUTOMATED COUNT: 42.8 FL (ref 35.9–50)
EST. AVERAGE GLUCOSE BLD GHB EST-MCNC: 108 MG/DL
FASTING STATUS PATIENT QL REPORTED: NORMAL
GFR SERPLBLD CREATININE-BSD FMLA CKD-EPI: 89 ML/MIN/1.73 M 2
GLOBULIN SER CALC-MCNC: 3.5 G/DL (ref 1.9–3.5)
GLUCOSE SERPL-MCNC: 117 MG/DL (ref 65–99)
GLUCOSE UR STRIP.AUTO-MCNC: NEGATIVE MG/DL
HBA1C MFR BLD: 5.4 % (ref 4–5.6)
HCT VFR BLD AUTO: 47.8 % (ref 42–52)
HDLC SERPL-MCNC: 54 MG/DL
HGB BLD-MCNC: 16.9 G/DL (ref 14–18)
IMM GRANULOCYTES # BLD AUTO: 0.02 K/UL (ref 0–0.11)
IMM GRANULOCYTES NFR BLD AUTO: 0.2 % (ref 0–0.9)
KETONES UR STRIP.AUTO-MCNC: NEGATIVE MG/DL
LDLC SERPL CALC-MCNC: 71 MG/DL
LEUKOCYTE ESTERASE UR QL STRIP.AUTO: NEGATIVE
LYMPHOCYTES # BLD AUTO: 3.44 K/UL (ref 1–4.8)
LYMPHOCYTES NFR BLD: 39.5 % (ref 22–41)
MCH RBC QN AUTO: 32.8 PG (ref 27–33)
MCHC RBC AUTO-ENTMCNC: 35.4 G/DL (ref 32.3–36.5)
MCV RBC AUTO: 92.8 FL (ref 81.4–97.8)
MICRO URNS: NORMAL
MICROALBUMIN UR-MCNC: 4.3 MG/DL
MICROALBUMIN/CREAT UR: 73 MG/G (ref 0–30)
MONOCYTES # BLD AUTO: 0.69 K/UL (ref 0–0.85)
MONOCYTES NFR BLD AUTO: 7.9 % (ref 0–13.4)
NEUTROPHILS # BLD AUTO: 4.3 K/UL (ref 1.82–7.42)
NEUTROPHILS NFR BLD: 49.5 % (ref 44–72)
NITRITE UR QL STRIP.AUTO: NEGATIVE
NRBC # BLD AUTO: 0 K/UL
NRBC BLD-RTO: 0 /100 WBC (ref 0–0.2)
PH UR STRIP.AUTO: 7 [PH] (ref 5–8)
PLATELET # BLD AUTO: 245 K/UL (ref 164–446)
PMV BLD AUTO: 8.6 FL (ref 9–12.9)
POTASSIUM SERPL-SCNC: 3.7 MMOL/L (ref 3.6–5.5)
PROT SERPL-MCNC: 7.7 G/DL (ref 6–8.2)
PROT UR QL STRIP: NEGATIVE MG/DL
PSA SERPL-MCNC: 4.41 NG/ML (ref 0–4)
RBC # BLD AUTO: 5.15 M/UL (ref 4.7–6.1)
RBC UR QL AUTO: NEGATIVE
SODIUM SERPL-SCNC: 137 MMOL/L (ref 135–145)
SP GR UR STRIP.AUTO: 1.01
TRIGL SERPL-MCNC: 184 MG/DL (ref 0–149)
TSH SERPL DL<=0.005 MIU/L-ACNC: 2.81 UIU/ML (ref 0.38–5.33)
WBC # BLD AUTO: 8.7 K/UL (ref 4.8–10.8)

## 2024-06-24 PROCEDURE — 82570 ASSAY OF URINE CREATININE: CPT

## 2024-06-24 PROCEDURE — 85025 COMPLETE CBC W/AUTO DIFF WBC: CPT

## 2024-06-24 PROCEDURE — 80053 COMPREHEN METABOLIC PANEL: CPT

## 2024-06-24 PROCEDURE — 81003 URINALYSIS AUTO W/O SCOPE: CPT

## 2024-06-24 PROCEDURE — 82043 UR ALBUMIN QUANTITATIVE: CPT

## 2024-06-24 PROCEDURE — 84443 ASSAY THYROID STIM HORMONE: CPT

## 2024-06-24 PROCEDURE — 84153 ASSAY OF PSA TOTAL: CPT

## 2024-06-24 PROCEDURE — 36415 COLL VENOUS BLD VENIPUNCTURE: CPT

## 2024-06-24 PROCEDURE — 83735 ASSAY OF MAGNESIUM: CPT

## 2024-06-24 PROCEDURE — 83036 HEMOGLOBIN GLYCOSYLATED A1C: CPT

## 2024-06-24 PROCEDURE — 82306 VITAMIN D 25 HYDROXY: CPT

## 2024-06-24 PROCEDURE — 80061 LIPID PANEL: CPT

## 2024-06-25 ENCOUNTER — HOSPITAL ENCOUNTER (OUTPATIENT)
Facility: MEDICAL CENTER | Age: 79
End: 2024-06-25
Attending: INTERNAL MEDICINE
Payer: MEDICARE

## 2024-06-25 ENCOUNTER — OFFICE VISIT (OUTPATIENT)
Dept: MEDICAL GROUP | Facility: MEDICAL CENTER | Age: 79
End: 2024-06-25
Payer: MEDICARE

## 2024-06-25 ENCOUNTER — TELEPHONE (OUTPATIENT)
Dept: MEDICAL GROUP | Facility: MEDICAL CENTER | Age: 79
End: 2024-06-25

## 2024-06-25 VITALS
DIASTOLIC BLOOD PRESSURE: 78 MMHG | RESPIRATION RATE: 16 BRPM | BODY MASS INDEX: 40.87 KG/M2 | TEMPERATURE: 97.7 F | WEIGHT: 260.4 LBS | HEART RATE: 100 BPM | OXYGEN SATURATION: 93 % | SYSTOLIC BLOOD PRESSURE: 136 MMHG | HEIGHT: 67 IN

## 2024-06-25 DIAGNOSIS — F11.20 OPIATE DEPENDENCE, CONTINUOUS (HCC): ICD-10-CM

## 2024-06-25 DIAGNOSIS — E66.01 MORBID (SEVERE) OBESITY DUE TO EXCESS CALORIES (HCC): ICD-10-CM

## 2024-06-25 DIAGNOSIS — R73.09 IMPAIRED GLUCOSE METABOLISM: ICD-10-CM

## 2024-06-25 DIAGNOSIS — Z00.00 PREVENTATIVE HEALTH CARE: Chronic | ICD-10-CM

## 2024-06-25 DIAGNOSIS — I10 PRIMARY HYPERTENSION: Chronic | ICD-10-CM

## 2024-06-25 DIAGNOSIS — M25.50 PAIN IN JOINT, MULTIPLE SITES: ICD-10-CM

## 2024-06-25 DIAGNOSIS — I48.0 PAROXYSMAL ATRIAL FIBRILLATION (HCC): ICD-10-CM

## 2024-06-25 DIAGNOSIS — M54.9 BACK PAIN, UNSPECIFIED BACK LOCATION, UNSPECIFIED BACK PAIN LATERALITY, UNSPECIFIED CHRONICITY: ICD-10-CM

## 2024-06-25 DIAGNOSIS — G89.4 CHRONIC PAIN DISORDER: ICD-10-CM

## 2024-06-25 DIAGNOSIS — G89.29 OTHER CHRONIC PAIN: ICD-10-CM

## 2024-06-25 DIAGNOSIS — R97.20 ABNORMAL PSA: ICD-10-CM

## 2024-06-25 DIAGNOSIS — G47.00 INSOMNIA, UNSPECIFIED TYPE: Chronic | ICD-10-CM

## 2024-06-25 DIAGNOSIS — E78.5 DYSLIPIDEMIA: Chronic | ICD-10-CM

## 2024-06-25 LAB — MAGNESIUM SERPL-MCNC: 1.9 MG/DL (ref 1.5–2.5)

## 2024-06-25 PROCEDURE — G0481 DRUG TEST DEF 8-14 CLASSES: HCPCS

## 2024-06-25 PROCEDURE — 99214 OFFICE O/P EST MOD 30 MIN: CPT | Performed by: INTERNAL MEDICINE

## 2024-06-25 PROCEDURE — 3075F SYST BP GE 130 - 139MM HG: CPT | Performed by: INTERNAL MEDICINE

## 2024-06-25 PROCEDURE — 3078F DIAST BP <80 MM HG: CPT | Performed by: INTERNAL MEDICINE

## 2024-06-25 RX ORDER — OXYCODONE AND ACETAMINOPHEN 10; 325 MG/1; MG/1
1 TABLET ORAL EVERY 6 HOURS PRN
Qty: 120 TABLET | Refills: 0 | Status: SHIPPED | OUTPATIENT
Start: 2024-09-01 | End: 2024-10-01

## 2024-06-25 RX ORDER — OXYCODONE AND ACETAMINOPHEN 10; 325 MG/1; MG/1
1 TABLET ORAL EVERY 6 HOURS PRN
Qty: 120 TABLET | Refills: 0 | Status: SHIPPED | OUTPATIENT
Start: 2024-06-03 | End: 2024-07-03

## 2024-06-25 RX ORDER — OXYCODONE AND ACETAMINOPHEN 10; 325 MG/1; MG/1
1 TABLET ORAL EVERY 6 HOURS PRN
Qty: 120 TABLET | Refills: 0 | Status: SHIPPED | OUTPATIENT
Start: 2024-08-02 | End: 2024-09-01

## 2024-06-25 ASSESSMENT — FIBROSIS 4 INDEX: FIB4 SCORE: 1.743343473850720098

## 2024-06-25 NOTE — PROGRESS NOTES
Subjective     Julio Gonzalez is a 78 y.o. male who presents with Follow-Up (Refill medications )            HPI    Here with wife, medication refill, currently on Percocet 10 mg 4 times daily quantity 120 per 30 days most recent refill 6/3/24 and ambien 10 mg 5/26/24, chronic back, knee, hip pain, has seen pain management previously with Dr. Mathias, has been able to taper Percocet down to 120 tablets per 30 days, previously had been taking 150 tablets every 30 days.  Status post lumbar surgery 2012, has tried hydrocodone, NSAIDs, pregabalin, Cymbalta, physical therapy, has seen neurosurgery, pain management, has had previous steroid injections.  Pain is stable and controlled on Percocet 10 mg 4/day without drowsiness, sedation, memory loss, confusion, depression.  No alcohol with the medication.  The Percocet allows him to perform his ADLs and continue to be active at home.  Also using duloxetine and naproxen without GI upset.  Hypertension, patient and wife have been checking the blood pressure, remains on losartan, Dyazide, amlodipine, average blood pressure last 3 months 122/73 march to june blood pressures running 114-120s average 122/73, low-sodium diet although summer months may be more difficult on his diet because of the heat, he has been diligently minimizing sweets such as ice cream, cookies, candies.  On zocor for dyslipidemia, recent labs done today  Paroxysmal atrial fibrillation followed by cardiology, magnesium ordered by cardiology but that was not done by the lab  6/24/24 chol 162,trig 184,hdl 54,ldl 71  6/24/24 vit d 87  6/24/24 A1c 5.4%  6/24/24 urine mac 73  6/24/24 psa 4.1  6/24/24 magnesium 1.9  6/24/24 bun 19,creat 0.86,Na 137,K+3.7  No increase in urination frequency or urgency, dribbling, nocturia once, no incontinence, no history of abnormal PSA  Chronic insomnia on Ambien, medication is effective without causing daytime drowsiness or hangover effect.  Unable to taper off the  medication.  Medications, allergies, medical history, surgical history, social history, family history  reviewed and updated    Patient Active Problem List   Diagnosis    S/P hip replacement    S/p lumbar surgery    Neck pain    Hypertension    Paroxysmal atrial fibrillation (HCC)    Dyslipidemia    History of squamous cell carcinoma    Preventative health care    S/P knee replacement    Morbid (severe) obesity due to excess calories (HCC)    S/p heel left surgery    History of stroke    Insomnia    Depression, major, in full remission (HCC)    Chronic pain    Knee arthritis    Opiate dependence, continuous (CMS-HCC)    S/P Maze operation for atrial fibrillation    Fibromyalgia    Impaired glucose metabolism    Abnormal PSA           Status post lumbar surgery  2/01 MRI lumbar spine DJD L5-S1 anterolisthesis 4-5 mm marked stenosis saw  neurosurgery  9/09  neurosurgery note  12/09 neurosurgery note, severe left-sided foraminal stenosis, L5-S1 spondylolisthesis 4-5 mm recommend surgery, patient declined, has had epidural with no benefit,tried lyrica and neurontin before, declines cymbalta trial  4/10 note dr. johnson neurosurgery who gives patient norco, he is retiring, and has offered patient surgical therapy versus continuing norco 10 mg which we'll assume dispensing.  10/11 MRI lumbar spine grade 1 spondylolisthesis 9mm, bilateral spondylosis L5, moderate bilateral L5 stenosis, 2.4 cm left kidney cyst  10/11 xray LS, grade 1 spondylolisthesis L5 on S1 increased from prior exam, 1.3 cm, no significant change in flexion  11/11  pain note right L5-S1 transforaminal epidural  1/16/12 start cymbalta trial (3/12 stop cymbalta no benefit)  1/12 dr. murphy neurosurgery note surgical intervention offered L4 to sacrum decompression  3/26/12 restarted cymbalta    9/26/12  neurosurgery operative note decompression at L5-S1and bilateral foraminotomies,transforaminal lumbar interbody fusion,  L4-L5 and L5-S1, with expandable cage 8-12 mm.  2/20/13 dr. murphy neurosurgery note, lumbar films, EMG NCS lower ext inconclusive, repeat MRI lumbar spine pending  3/13/13 MRI lumbar spine postoperative changes L4-S1 lumbar laminectomy, interbody fusion, disc space insert L4-L5 L5-S1, posterior spinal fusion and fixation, L5-S1 moderate right foraminal stenosis. 2.4 cm mid right renal cyst unchanged  3/26/13 dr. murphy neurosurgery note, no further surgical intervention necessary, chronic narcotics norco 10 mg 5 per day; I will assume the norco rx from   1/25/15 off naprosyn and cymbalta; on norco 10 mg one every four hours #150  5/21/15 on percocet 10 mg five per day and on cymbalta 60 mg  3/25/16 start lyrica 50 mg tid for fibromyalgia, continue percocet 10 mg 5 per day and cymbalta 60 mg  4/12/16 increase lyrica to 100 mg tid (50 mg two tid), continue cymbalta 60 mg and percocet 10 mg 5 per day  9/26/16 off lyrica, on percocet 10 mg qid per  pain management and cymbalta  7/9/19 on percocet 10 mg #140 per 28 days from  pain management also on cymbalta 60 mg and naprosyn 500 mg bid  10/7/20  pain note on oxycodone #140 per 28 days will decrease to #112 per 28 days  11/4/20  pain note on oxycodone #112 per 28 days   10/7/21 I am taking over percocet refill, 4 per day #120 per 30 days  12/13/21 percocet 10 mg #120     Status post knee replacement left 2001     Status post hip replacement bilateral  12/03 right total hip replacement  1/04 left total hip replacement      s/p heel surgery  12/09  left foot heel spur surgery     Preventative health  4/6/07 colonoscopy [facility] negative  8/27/15 prevnar at [facility]  12/12/19 tdap  12/12/19 pneumovax  8/2/21 declines colon  8/13/21 FIT negative  8/24/21 hep c ab negative  10/7/21 shingrix second  12/19/23 declines all vaccines  6/24/24 vit d 87  6/24/24 psa 4.1     Paroxysmal atrial fibrillation  Sees [facility]  2005 s/p  maze procedure with a stroke related to that as a complication, have no records at all regarding that, no CT scan or MRI scan and probably had a cardiac catheterization by renal physicians in 2005 that was negative for coronary artery disease  8/11 [facility] note EKG NSR  5/12 [facility] note, on asa daily  11/12 [facility] note  4/29/13 cardiology note  11/3/13 cardiology note on asa  9/22/14 cardiology note sinus, follow up 6 months  5/12/15  cardiology note, paroxysmal atrial fibrillation status post maze operation, sinus rhythm  11/18/15 cardiology note stable follow one year  4/4/17  cardiology note, paroxysmal atrial fibrillation no recurrence, follow-up one year  2/5/19 cardiology note paroxysmal atrial fibrillation, sinus on exam, no recurrence of atrial fibrillation status post maze, follow-up 1 year  6/16/20 cardiology note atrial fibrillation status post maze no recurrence, hypertension stable on current regimen, work on weight loss, follow-up 6 months  7/13/21  cardiology note  1/4/22 declines overnight pulse oximetry  7/6/22 cardiology no changes   7/31/23  cardiology note stable follow up 6 months     neck pain  3/06 MRI cervical spine moderate subdural frontal stenosis C5-C6 lesser extent C6-C7     knee arthritis   9/3/14 x-ray right knee marked tricompartmental osteoarthritis, most severe medial compartment  2/19/16 has seen  orthopedics, declines knee replacement       Insomnia  2009 on ambien 10 mg  2/19/16 unable to taper ambien, referral to behavioral sleep psychology recommended he declines  9/26/16 on ambien 10 mg work on taper, declines referral to sleep psychology  7/9/18 on ambien declines sleep management referral  7/20/20 taper ambien if possible  12/30/22 contract  6/22/23 UDT  12/19/23 contract  2/26/23 ambien refill  3/26/24 work on ambien taper   5/26/24 ambien refill #90     Impaired glucose metabolism  11/8/18 A1c 6%  12/6/19 A1c 5.8%  8/24/21 A1c 5.5%  6/19/23  A1c 5.6%  6/24/24 A1c 5.4%      Hypertension  7/10 [facility] note change from hyzaar to enalapril hct 10/25 1/11 [facility] note bp not controlled on enalapril hct 10/25, change back to losartan hct 100/25 and norvasc 10 mg  6/12 K+ 3.0, increase kdur to 20 tid, consider decreasing hctz if possible to decrease K+ supplementation  12/12 cozaar 100 mg, dyazide 37.5/25 mg, kcl 20 meq, norvasc 10 mg  1/29/14 on cozaar 100 mg, dyazide 37.5/25 mg, norvasc 10 mg, klor 20 meq bid; will increase to klor 20 meq tid  9/3//14 K+ 3.1 on klor 20 meq tid, increase to 20 meq two tab bid  9/3/14 urine mac <0.5 on cozaar 100 mg, dyazide 37.5/25 mg, norvasc 10 mg, klor 20 meq two tab bid  6/30/15 Na 132,K+ 3.5 on cozaar 100 mg, dyazide 37.5/25 mg, norvasc 10 mg, klor 20 meq two tab bid  8/31/16 K+ 3.3 on cozaar 100 mg, dyazide 37.5/25 mg, norvasc 10 mg, klor 20 meq two tab qday will increase to 2 bid  7/11/17 K+3.3 on cozaar 100 mg, dyazide 37.5/25 mg, norvasc 10 mg, klor 20 meq two bid  2/5/19 cardiology note paroxysmal atrial fibrillation, sinus on exam, no recurrence of atrial fibrillation status post maze, follow-up 1 year  12/6/19 K+3.5 on cozaar 100 mg, dyazide 37.5/25 mg, norvasc 10 mg, klor 20 meq two bid  6/16/20 cardiology note atrial fibrillation status post maze no recurrence, hypertension stable on current regimen, work on weight loss, follow-up 6 months  7/13/21  cardiology note on cozaar 100 mg, dyazide 37.5/25 mg, norvasc 10 mg, klor 20 meq two bid  8/24/21 K+ 3.4 on klor 20 meq two bid recommend increase to 2 am, 1 noon, 2 pm  6/22/22 K+ 3.4 on klor 20 meq 2 am, 1 noon, 2 pm on cozaar 100 mg, dyazide 37.5/25 mg, norvasc 10 mg   7/6/22 cardiology note no changes   6/19/23 K+ 3.8 on klor 20 meq 2 am, 1 noon, 2 pm on cozaar 100 mg, dyazide 37.5/25 mg, norvasc 10 mg   12/8/23 sbp running 135-145 recommend add coreg he declines  3/21/24 home blood pressures running 126/73 on klor 20 meq 2 am, 1 noon, 2 pm on cozaar 100 mg,  dyazide 37.5/25 mg, norvasc 10 mg    6/25/24 bun 19,creat 0.86,Na 137,K+3.7 on cozaar 100 mg,norvasc 10 mg,dyazide 37.5/25 mg average blood pressure april to june 122/73     History stroke  1999 affected right arm, no old records  Some difficulty with short recall, and occasional diff with expressing self when fatigued     History squamous cell carcinoma  Saw  dermatology offered aldara he declined  3/27/17  dermatology note  5/1/17  dermatology biopsy proven squamous cell carcinoma in situ left forehead, here for mohs  9/19/17  dermatology note continue   12/19/23 declines dermatology follow up     Fibromyalgia  3/25/16 start lyrica 50 mg tid for fibromyalgia, continue percocet 10 mg 5 per day and cymbalta 60 mg  4/12/16 increase lyrica to 100 mg tid (50 mg two tid), continue cymbalta 60 mg and percocet 10 mg 5 per day  4/28/16 increase lyrica to 150 mg tid, continue cymbalta 60 mg and percocet 10 mg five times per day  5/10/16 on lyrica 50 mg three tabs tid, change to 100 mg am, 150 mg noon, 100 mg pm     Dyslipidemia  Followed by cardiology  2/09 cholesterol 135, triglycerides 100, HDL 47, LDL 68  8/09 chol 138,trig 101,hdl 49,ldl 69  3/10 chol 123,trig 66,hdl 49,ldl 61  7/10 [facility] note change vytorin to zocor 40  11/10 chol 141,trig 75,hdl 62,ldl 64 on vytorin 10/20 mg per [facility]  10/11 chol 159,trig 102,hdl 52,ldl 87 on vytorin 10/20 per [facility]  2/29/12 stop vytorin, change to zocor 20 mg per [facility]  6/12 chol 152,trig 102,hdl 49,ldl 83 on zocor 20 mg  6/13 chol 150,trig 130,hdl 52,ldl 72 on zocor 20 mg  1/29/14 chol 147,trig 127,hdl 50,ldl 72 on zocor 20 mg  9/3/14 chol 169,trig 228,hdl 49,ldl 74 on zocor 20 mg  6/30/15 chol 151,trig 88,hdl 63,ldl 70 on zocor 20 mg  8/31/16 chol 169,trig 130,hdl 59,ldl 84 on zocor 20 mg  7/11/17 chol 147,trig 146,hdl 51,ldl 67 on zocor 20 mg  11/8/18 chol 189,trig 215,hdl 54,ldl 92 on zocor 20 mg  12/6/19 chol 163,trig 214,hdl 54,ldl 66 on  zocor 20 mg  8/24/21 chol 166,trig 142,hdl 57,ldl 81 on zocor 20 mg  6/22/22 chol 149,trig 139,hdl 48,ldl 73 on zocor 20 mg  6/19/23 chol 153,trig 160,hdl 51,ldl 70 on zocor 20 mg  6/24/24 chol 162,trig 184,hdl 54,ldl 71 on zocor 20 mg     Depression  3/12 tried cymalta for pain no benefit  12/6/13 PHQ 9 score 15, start cymbalta samples 30 mg x 7 days, then 60 mg  12/20/13 cymbalta 60 mg  1/22/15 off cymbalta    5/21/15 on cymbalta 60 mg  2/19/16 referral to behavioral health recommended he declines  11/29/16 depression screening score 0, continues on cymbalta  1/30/18 depression screening score 0 on cymbalta  8/2/21 on cymbalta screening 0 score   1/4/22 on cymbalta screening 0/3 score  3/16/24 on cymbalta     Chronic opiate  12/29/22 pain contract  3/23/23 urine specimen not enough for UDT  6/22/23 UDT  12/19/23 contract  1/5/23 percocet  2/4/23 percocet  3/5/23 percocet  4/4/24 percocet  5/4/24 percocet  6/3/24 percocet  6/24/24 UDT  Has tried hydrocodone, NSAIDs, pregabalin, steroid injections, physical therapy, cymbalta                 Current Outpatient Medications   Medication Sig Dispense Refill    potassium chloride SA (KDUR) 20 MEQ Tab CR TAKE 2 TABLETS BY MOUTH EVERY  MORNING, 1 TABLET AT NOON, AND 2 TABLETS EVERY EVENING. DOSING  CHANGE FROM 2 TABS TWICE A DAY,  ADDING EXTRA DOSE AT MIDDAY. 450 Tablet 0    zolpidem (AMBIEN) 10 MG Tab Take 1 Tablet by mouth at bedtime as needed for Sleep for up to 90 days. G47.00, #90 per 90 days ok refill 5/26/24 90 Tablet 0    oxyCODONE-acetaminophen (PERCOCET-10)  MG Tab Take 1 Tablet by mouth every 6 hours as needed for Severe Pain (back pain) for up to 30 days. 120 Tablet 0    simvastatin (ZOCOR) 20 MG Tab Take 1 Tablet by mouth every evening. 100 Tablet 1    losartan (COZAAR) 100 MG Tab Take 1 Tablet by mouth every day. 100 Tablet 1    triamterene/hctz (MAXZIDE-25/DYAZIDE) 37.5-25 MG Cap Take 2 Capsules by mouth every day. 200 Capsule 1    DULoxetine  "(CYMBALTA) 60 MG Cap DR Particles delayed-release capsule TAKE ONE CAPSULE BY MOUTH DAILY 90 Capsule 3    naproxen (NAPROSYN) 500 MG Tab TAKE ONE TABLET BY MOUTH TWICE A DAY AS NEEDED FOR PAIN (GENERIC FOR NAPROSYN) 180 Tablet 3    amLODIPine (NORVASC) 10 MG Tab TAKE 1 TABLET BY MOUTH DAILY 90 Tablet 3    loratadine (CLARITIN) 10 MG Tab       Multiple Vitamins-Minerals (MULTI COMPLETE PO) Take  by mouth.      aspirin (ASA) 81 MG Chew Tab chewable tablet Take 1 Tab by mouth every day. 100 Tab 11    polyethylene glycol/lytes (MIRALAX) PACK Take 17 g by mouth every day.      docusate sodium (COLACE) 100 MG CAPS Take 100 mg by mouth every day.      vitamin D (CHOLECALCIFEROL) 1000 UNIT TABS Take 6,000 Units by mouth every day.       No current facility-administered medications for this visit.         ROS           Objective     /78 (BP Location: Right arm, Patient Position: Sitting, BP Cuff Size: Adult)   Pulse 100   Temp 36.5 °C (97.7 °F) (Temporal)   Resp 16   Ht 1.702 m (5' 7\")   Wt 118 kg (260 lb 6.4 oz)   SpO2 93%   BMI 40.78 kg/m²      Physical Exam  Vitals and nursing note reviewed.   Constitutional:       Appearance: Normal appearance.   HENT:      Head: Normocephalic and atraumatic.      Right Ear: External ear normal.      Left Ear: External ear normal.   Eyes:      Conjunctiva/sclera: Conjunctivae normal.   Cardiovascular:      Rate and Rhythm: Normal rate and regular rhythm.      Heart sounds: Normal heart sounds.   Pulmonary:      Effort: Pulmonary effort is normal.      Breath sounds: Normal breath sounds.   Abdominal:      General: There is no distension.   Musculoskeletal:         General: No swelling.      Cervical back: Neck supple.   Skin:     Findings: No bruising.   Neurological:      General: No focal deficit present.      Mental Status: He is alert.       Declines rectal prostate exam                      Assessment & Plan      Assessment  #1 hypertension blood pressures have " improved, average since April 122/73, continues on amlodipine 10 mg, losartan 100 mg, Dyazide 37.5/25 mg daily, continues to follow low-sodium diet    #2 chronic low back pain, knee and hip pain, stable on Percocet 10 mg 4/day, quantity 120 per 30 days most recent refill [facility] 3, medication is effective controlling his pain allowing him to perform his activities of daily living without side effects    #3 chronic opiate therapy Percocet 10 mg quantity 120 per 30 days, having tried hydrocodone, NSAIDs, pregabalin, Cymbalta, steroids, seen by physical therapy, pain management, neurosurgery previously, no drowsiness, sedation, memory loss, depression, no alcohol with the medication.  Pain is stable and controlled on medication chronic opiate therapy, continues on naproxen and Cymbalta    #4 chronic insomnia on Ambien 10 mg nightly no daytime drowsiness or hangover effect, most recent refill May 26    #5 chronic sedative hypnotic therapy with Ambien 10 mg no daytime drowsiness, does not affect mood, mentation, no falls, no alcohol with the medication    #6 dyslipidemia 6/24/24 chol 162,trig 184,hdl 54,ldl 71 on zocor 20 mg    #7 abnormal PSA 4.4, previous PSA a year ago 3.3, some occasional urgency, dribbling, nocturia x 1    #8 paroxysmal atrial fibrillation, sinus rhythm on my exam, follows up with cardiology    #9 BMI 40.78 obesity    Plan  #1 continue losartan, amlodipine, Dyazide, check blood pressure periodically and record    #2 continue his low-sodium diet, try to limit processed food intake, limiting sweets and candies    #3 reviewed laboratory testing from June 24 with him, cardiology ordered magnesium but that was not done by the lab    #4 continue simvastatin, slightly elevated triglycerides, no need for medication changes continue work on limiting sweets, candies, processed foods in his diet    #5 abnormal PSA, he declines prostate exam, will repeat PSA 3 months diagnostic ordered    #6 continue Percocet 10  mg 1 tablet 4 times a day quantity 120 per 30 days most recent refill Consuelo 3, subsequent 3 months sent to his pharmacy, I believe benefits of chronic opiate therapy outweigh potential risks understanding long-term habituation, dependence, mood changes, memory loss, continue no alcohol with the medication    #7 continue Ambien, I believe long-term benefits outweigh potential risks of habituation, dependence, memory loss    #8 magnesium added to labs done yesterday, this was ordered by cardiology last year but not done at yesterday's lab draw    #9 follow-up 3 months    #10 follow-up cardiology

## 2024-06-25 NOTE — TELEPHONE ENCOUNTER
Called [facility] , spoke with Cammie . They were able to add the magnesium test to orders . Will process lab

## 2024-06-30 LAB
1OH-MIDAZOLAM UR QL SCN: NOT DETECTED
6MAM UR QL: NOT DETECTED
7AMINOCLONAZEPAM UR QL: NOT DETECTED
A-OH ALPRAZ UR QL: NOT DETECTED
ALPRAZ UR QL: NOT DETECTED
AMPHET UR QL SCN: NOT DETECTED
ANNOTATION COMMENT IMP: NORMAL
BARBITURATES UR QL: NEGATIVE
BUPRENORPHINE UR QL: NOT DETECTED
BZE UR QL: NEGATIVE
CARBOXYTHC UR QL: NEGATIVE
CARISOPRODOL UR QL: NEGATIVE
CLONAZEPAM UR QL: NOT DETECTED
CODEINE UR QL: NOT DETECTED
CREAT UR-MCNC: 176.6 MG/DL (ref 20–400)
DIAZEPAM UR QL: NOT DETECTED
ETHYL GLUCURONIDE UR QL: NORMAL
FENTANYL UR QL: NOT DETECTED
GABAPENTIN UR QL CFM: NOT DETECTED
HYDROCODONE UR QL: NOT DETECTED
HYDROMORPHONE UR QL: NOT DETECTED
LORAZEPAM UR QL: NOT DETECTED
MDA UR QL: NOT DETECTED
MDEA UR QL: NOT DETECTED
MDMA UR QL: NOT DETECTED
ME-PHENIDATE UR QL: NOT DETECTED
METHADONE UR QL: NEGATIVE
METHAMPHET UR QL: NOT DETECTED
MIDAZOLAM UR QL SCN: NOT DETECTED
MORPHINE UR QL: NOT DETECTED
NALOXONE UR QL CFM: NOT DETECTED
NORBUPRENORPHINE UR QL CFM: NOT DETECTED
NORDIAZEPAM UR QL: NOT DETECTED
NORFENTANYL UR QL: NOT DETECTED
NORHYDROCODONE UR QL CFM: NOT DETECTED
NORMEPERIDINE UR QL CFM: NOT DETECTED
NOROXYCODONE UR QL CFM: PRESENT
NOROXYMORPHONE UR QL SCN: PRESENT
OXAZEPAM UR QL: NOT DETECTED
OXYCODONE UR QL: PRESENT
OXYMORPHONE UR QL: PRESENT
PATHOLOGY STUDY: NORMAL
PCP UR QL: NEGATIVE
PHENTERMINE UR QL: NOT DETECTED
PREGABALIN UR QL CFM: NOT DETECTED
SERVICE CMNT-IMP: NORMAL
TAPENTADOL UR QL SCN: NOT DETECTED
TAPENTADOL UR QL SCN: NOT DETECTED
TEMAZEPAM UR QL: NOT DETECTED
TRAMADOL UR QL: NEGATIVE
ZOLPIDEM PHENYL-4-CARB UR QL SCN: PRESENT
ZOLPIDEM UR QL: PRESENT

## 2024-07-25 ENCOUNTER — TELEPHONE (OUTPATIENT)
Dept: CARDIOLOGY | Facility: MEDICAL CENTER | Age: 79
End: 2024-07-25
Payer: MEDICARE

## 2024-07-30 ENCOUNTER — APPOINTMENT (OUTPATIENT)
Dept: CARDIOLOGY | Facility: MEDICAL CENTER | Age: 79
End: 2024-07-30
Attending: INTERNAL MEDICINE
Payer: MEDICARE

## 2024-08-04 DIAGNOSIS — I48.0 PAF (PAROXYSMAL ATRIAL FIBRILLATION) (HCC): ICD-10-CM

## 2024-08-04 DIAGNOSIS — I10 ESSENTIAL HYPERTENSION: ICD-10-CM

## 2024-08-05 RX ORDER — AMLODIPINE BESYLATE 10 MG/1
10 TABLET ORAL DAILY
Qty: 100 TABLET | Refills: 0 | Status: SHIPPED | OUTPATIENT
Start: 2024-08-05

## 2024-08-05 NOTE — TELEPHONE ENCOUNTER
Is the patient due for a refill? Yes    Was the patient seen the past year? No    Date of last office visit: 07/31/2023    Does the patient have an upcoming appointment?  Yes   If yes, When? 09/10/2024    Provider to refill:[email or web address]    Does the patients insurance require a 100 day supply?  Yes

## 2024-08-11 DIAGNOSIS — I10 ESSENTIAL HYPERTENSION: ICD-10-CM

## 2024-08-11 DIAGNOSIS — I48.0 PAF (PAROXYSMAL ATRIAL FIBRILLATION) (HCC): ICD-10-CM

## 2024-08-12 NOTE — TELEPHONE ENCOUNTER
Received request via: Patient    Was the patient seen in the last year in this department? Yes    Does the patient have an active prescription (recently filled or refills available) for medication(s) requested?  Yes    Pharmacy Name: [facility] in [place]    Does the patient have care home Plus and need 100-day supply? (This applies to ALL medications) Patient does not have SCP

## 2024-08-14 RX ORDER — LOSARTAN POTASSIUM 100 MG/1
100 TABLET ORAL DAILY
Qty: 100 TABLET | Refills: 2 | Status: SHIPPED | OUTPATIENT
Start: 2024-08-14

## 2024-08-14 RX ORDER — TRIAMTERENE AND HYDROCHLOROTHIAZIDE 37.5; 25 MG/1; MG/1
2 CAPSULE ORAL DAILY
Qty: 200 CAPSULE | Refills: 2 | Status: SHIPPED | OUTPATIENT
Start: 2024-08-14

## 2024-08-19 DIAGNOSIS — I10 ESSENTIAL HYPERTENSION: ICD-10-CM

## 2024-08-19 DIAGNOSIS — G47.00 INSOMNIA, UNSPECIFIED TYPE: ICD-10-CM

## 2024-08-19 DIAGNOSIS — I48.0 PAF (PAROXYSMAL ATRIAL FIBRILLATION) (HCC): ICD-10-CM

## 2024-08-19 RX ORDER — ZOLPIDEM TARTRATE 10 MG/1
10 TABLET ORAL
Qty: 90 TABLET | Refills: 0 | Status: SHIPPED | OUTPATIENT
Start: 2024-08-24 | End: 2024-08-27 | Stop reason: SDUPTHER

## 2024-08-19 RX ORDER — ZOLPIDEM TARTRATE 10 MG/1
10 TABLET ORAL
Qty: 100 TABLET | Refills: 0 | Status: CANCELLED | OUTPATIENT
Start: 2024-08-19 | End: 2024-11-17

## 2024-08-19 NOTE — TELEPHONE ENCOUNTER
Received request via: Pharmacy    Was the patient seen in the last year in this department? Yes    Does the patient have an active prescription (recently filled or refills available) for medication(s) requested? No    Pharmacy Name: [facility]     Does the patient have detention Plus and need 100-day supply? (This applies to ALL medications) Yes, quantity updated to 100 days

## 2024-08-24 RX ORDER — ZOLPIDEM TARTRATE 10 MG/1
10 TABLET ORAL
Qty: 90 TABLET | Refills: 0 | OUTPATIENT
Start: 2024-08-24 | End: 2024-11-22

## 2024-08-27 ENCOUNTER — TELEPHONE (OUTPATIENT)
Dept: MEDICAL GROUP | Facility: MEDICAL CENTER | Age: 79
End: 2024-08-27
Payer: MEDICARE

## 2024-08-27 DIAGNOSIS — I10 ESSENTIAL HYPERTENSION: ICD-10-CM

## 2024-08-27 DIAGNOSIS — I48.0 PAF (PAROXYSMAL ATRIAL FIBRILLATION) (HCC): ICD-10-CM

## 2024-08-27 DIAGNOSIS — G47.00 INSOMNIA, UNSPECIFIED TYPE: ICD-10-CM

## 2024-08-27 RX ORDER — ZOLPIDEM TARTRATE 10 MG/1
10 TABLET ORAL
Qty: 90 TABLET | Refills: 0 | Status: SHIPPED | OUTPATIENT
Start: 2024-08-27 | End: 2024-11-25

## 2024-09-02 DIAGNOSIS — I10 HYPERTENSION, UNSPECIFIED TYPE: ICD-10-CM

## 2024-09-02 RX ORDER — POTASSIUM CHLORIDE 1500 MG/1
TABLET, EXTENDED RELEASE ORAL
Qty: 500 TABLET | Refills: 2 | Status: SHIPPED | OUTPATIENT
Start: 2024-09-02

## 2024-09-10 ENCOUNTER — OFFICE VISIT (OUTPATIENT)
Dept: CARDIOLOGY | Facility: MEDICAL CENTER | Age: 79
End: 2024-09-10
Attending: INTERNAL MEDICINE
Payer: MEDICARE

## 2024-09-10 VITALS
OXYGEN SATURATION: 92 % | DIASTOLIC BLOOD PRESSURE: 72 MMHG | HEIGHT: 67 IN | BODY MASS INDEX: 41.47 KG/M2 | SYSTOLIC BLOOD PRESSURE: 178 MMHG | HEART RATE: 73 BPM | WEIGHT: 264.2 LBS | RESPIRATION RATE: 16 BRPM

## 2024-09-10 DIAGNOSIS — I48.0 PAROXYSMAL ATRIAL FIBRILLATION (HCC): Chronic | ICD-10-CM

## 2024-09-10 DIAGNOSIS — I10 PRIMARY HYPERTENSION: Chronic | ICD-10-CM

## 2024-09-10 LAB — EKG IMPRESSION: NORMAL

## 2024-09-10 PROCEDURE — 93005 ELECTROCARDIOGRAM TRACING: CPT | Performed by: NURSE PRACTITIONER

## 2024-09-10 PROCEDURE — 3078F DIAST BP <80 MM HG: CPT | Performed by: NURSE PRACTITIONER

## 2024-09-10 PROCEDURE — 99213 OFFICE O/P EST LOW 20 MIN: CPT | Performed by: NURSE PRACTITIONER

## 2024-09-10 PROCEDURE — 93010 ELECTROCARDIOGRAM REPORT: CPT | Performed by: INTERNAL MEDICINE

## 2024-09-10 PROCEDURE — 3077F SYST BP >= 140 MM HG: CPT | Performed by: NURSE PRACTITIONER

## 2024-09-10 ASSESSMENT — ENCOUNTER SYMPTOMS
NAUSEA: 0
SENSORY CHANGE: 0
NEUROLOGICAL NEGATIVE: 1
NERVOUS/ANXIOUS: 0
HALLUCINATIONS: 0
PALPITATIONS: 0
DIZZINESS: 0
GASTROINTESTINAL NEGATIVE: 1
MUSCULOSKELETAL NEGATIVE: 1
PND: 0
DEPRESSION: 0
ORTHOPNEA: 0
SHORTNESS OF BREATH: 0
CLAUDICATION: 0
WHEEZING: 0
CONSTITUTIONAL NEGATIVE: 1
BRUISES/BLEEDS EASILY: 0
EYES NEGATIVE: 1
RESPIRATORY NEGATIVE: 1

## 2024-09-10 ASSESSMENT — FIBROSIS 4 INDEX: FIB4 SCORE: 1.77

## 2024-09-10 NOTE — ASSESSMENT & PLAN NOTE
- controlled with home pressures   - likely some white coat hypertension   - no new complaints   - follow up as needed

## 2024-09-10 NOTE — PROGRESS NOTES
Cardiology Follow up Note    DOS: 9/10/2024  [id number]  Julio Gonzalez    Chief complaint/Reason for consult: annual follow up    HPI: Pt is a 79 y.o. male who presents to the clinic today in follow up for routine cardiac care. Patient has a past medical history significant for but not limited to: hypertension, h/o CVA, paroxysmal atrial fibrillation S/P MAZE and left atrial appendage ligation, chronic joint pain and surgeries. Patient doing well. Some white coat hypertension. Blood pressures from home very well controlled. Patient inquires about following with PCP only. Dr. Peterson is very adept at taking care of his patients and can contact me to get involved at any time should the patient require higher cardiac care.       Past Medical History:   Diagnosis Date    Aphasia     Arrhythmia     a-fib, ablation x 2    Arthritis     Hypercholesteremia     Hypertension     MEDICAL HOME     Pain     right leg & foot, left foot, lower back    Stroke (HCC) 1999    aphasia , right sided weakness    Unspecified cerebral artery occlusion without mention of cerebral infarction 2/13/2012       Past Surgical History:   Procedure Laterality Date    FUSION, SPINE, LUMBAR, PLIF  9/26/2012    Performed by Chris Beltran M.D. at SURGERY [facility]    LUMBAR DECOMPRESSION  9/26/2012    Performed by Chris Beltran M.D. at SURGERY [facility]    [place] INJ DX/THER AGNT PARAVERT FACET JOINT, ERVIN*  11/4/2011    Performed by HAMLET GASCA at [facility]    [place] INJ DX/THER AGNT PARAVERT FACET JOINT, CE*  11/4/2011    Performed by HAMLET GASCA at [facility]    ESWT  12/9/2009    Performed by DEEPAK ALLEN at [facility]    KNEE REPLACEMENT, TOTAL  2002    KNEE REPLACEMENT, TOTAL  2001    left    BLOCK EPIDURAL STEROID INJECTION  2001    x 2 back    OTHER CARDIAC SURGERY  2000    surgical maze    OTHER  1999    cardiac ablation x 2    ANGIOGRAM  1999    cardiac    OTHER  1994     bone spur removed right heel    VEIN LIGATION  1987    left    OTHER ABDOMINAL SURGERY  1981    stomach stapling    COLONOSCOPY      HIP REPLACEMENT, TOTAL      right/left    MAZE PROCEDURE          MAZE PROCEDURE         Social History     Socioeconomic History    Marital status:      Spouse name: Not on file    Number of children: Not on file    Years of education: Not on file    Highest education level: Not on file   Occupational History    Not on file   Tobacco Use    Smoking status: Former     Current packs/day: 0.00     Types: Cigarettes     Quit date: 1967     Years since quittin.0    Smokeless tobacco: Never   Substance and Sexual Activity    Alcohol use: No    Drug use: No    Sexual activity: Not on file   Other Topics Concern    Not on file   Social History Narrative    Not on file     Social Determinants of Health     Financial Resource Strain: Not on file   Food Insecurity: No Food Insecurity (2019)    Hunger Vital Sign     Worried About Running Out of Food in the Last Year: Never true     Ran Out of Food in the Last Year: Never true   Transportation Needs: No Transportation Needs (2019)    PRAPARE - Transportation     Lack of Transportation (Medical): No     Lack of Transportation (Non-Medical): No   Physical Activity: Not on file   Stress: Not on file   Social Connections: Not on file   Intimate Partner Violence: Not on file   Housing Stability: Not on file       Family History   Problem Relation Age of Onset    Heart Disease Brother         atrial fib    Cancer Sister         breast cancer    Heart Disease Unknown     Hypertension Unknown     Stroke Unknown        Allergies   Allergen Reactions    Dye Fdc Blue [Brilliant Blue Fcf]      Angiogram contrast sensitivity, became very irritable       Current Outpatient Medications   Medication Sig Dispense Refill    potassium chloride SA (KDUR) 20 MEQ Tab CR TAKE 2 TABLETS BY MOUTH EVERY  MORNING, 1  TABLET AT NOON, AND 2 TABLETS EVERY EVENING. DOSING  CHANGE FROM 2 TABS TWICE A DAY,  ADDING EXTRA DOSE AT MIDDAY. 500 Tablet 2    zolpidem (AMBIEN) 10 MG Tab Take 1 Tablet by mouth at bedtime as needed for Sleep for up to 90 days. G47.00, #90 per 90 days ok refill 8/27/24 90 Tablet 0    losartan (COZAAR) 100 MG Tab TAKE 1 TABLET BY MOUTH DAILY 100 Tablet 2    triamterene/hctz (MAXZIDE-25/DYAZIDE) 37.5-25 MG Cap TAKE 2 CAPSULES BY MOUTH DAILY 200 Capsule 2    amLODIPine (NORVASC) 10 MG Tab TAKE 1 TABLET BY MOUTH DAILY 100 Tablet 0    oxyCODONE-acetaminophen (PERCOCET)  MG Tab Take 1 Tablet by mouth every 6 hours as needed for Severe Pain (back pain) for up to 30 days. 120 Tablet 0    simvastatin (ZOCOR) 20 MG Tab Take 1 Tablet by mouth every evening. 100 Tablet 1    DULoxetine (CYMBALTA) 60 MG Cap DR Particles delayed-release capsule TAKE ONE CAPSULE BY MOUTH DAILY 90 Capsule 3    naproxen (NAPROSYN) 500 MG Tab TAKE ONE TABLET BY MOUTH TWICE A DAY AS NEEDED FOR PAIN (GENERIC FOR NAPROSYN) 180 Tablet 3    loratadine (CLARITIN) 10 MG Tab       Multiple Vitamins-Minerals (MULTI COMPLETE PO) Take  by mouth.      aspirin (ASA) 81 MG Chew Tab chewable tablet Take 1 Tab by mouth every day. 100 Tab 11    polyethylene glycol/lytes (MIRALAX) PACK Take 17 g by mouth every day.      docusate sodium (COLACE) 100 MG CAPS Take 100 mg by mouth every day.      vitamin D (CHOLECALCIFEROL) 1000 UNIT TABS Take 6,000 Units by mouth every day.       No current facility-administered medications for this visit.       Vitals:    09/10/24 1102   BP: (!) 178/72   Pulse: 73   Resp: 16   SpO2: 92%     Home pressure all well contorlled    Review of Systems   Constitutional: Negative.  Negative for malaise/fatigue.   HENT: Negative.     Eyes: Negative.    Respiratory: Negative.  Negative for shortness of breath and wheezing.    Cardiovascular:  Negative for chest pain, palpitations, orthopnea, claudication, leg swelling and PND.    Gastrointestinal: Negative.  Negative for nausea.   Genitourinary: Negative.    Musculoskeletal: Negative.    Skin: Negative.    Neurological: Negative.  Negative for dizziness and sensory change.   Endo/Heme/Allergies: Negative.  Does not bruise/bleed easily.   Psychiatric/Behavioral:  Negative for depression and hallucinations. The patient is not nervous/anxious.           EKG interpreted by me: Sinus with PAC    Physical Exam  Constitutional:       Appearance: Normal appearance.   HENT:      Head: Normocephalic.   Eyes:      Pupils: Pupils are equal, round, and reactive to light.   Neck:      Vascular: No JVD.   Cardiovascular:      Rate and Rhythm: Normal rate and regular rhythm.      Pulses: Normal pulses.      Heart sounds: Normal heart sounds.   Pulmonary:      Effort: Pulmonary effort is normal.      Breath sounds: Normal breath sounds.   Abdominal:      General: Abdomen is flat.      Palpations: Abdomen is soft.   Musculoskeletal:      Cervical back: Normal range of motion.      Right lower leg: No edema.      Left lower leg: No edema.   Skin:     General: Skin is warm and dry.   Neurological:      Mental Status: He is alert and oriented to person, place, and time.   Psychiatric:         Mood and Affect: Mood normal.         Behavior: Behavior normal.          Data:  Lipids:   Lab Results   Component Value Date/Time    CHOLSTRLTOT 162 06/24/2024 07:44 AM    TRIGLYCERIDE 184 (H) 06/24/2024 07:44 AM    HDL 54 06/24/2024 07:44 AM    LDL 71 06/24/2024 07:44 AM        BMP:  Lab Results   Component Value Date/Time    SODIUM 137 06/24/2024 0744    POTASSIUM 3.7 06/24/2024 0744    CHLORIDE 98 06/24/2024 0744    CO2 26 06/24/2024 0744    GLUCOSE 117 (H) 06/24/2024 0744    BUN 19 06/24/2024 0744    CREATININE 0.86 06/24/2024 0744    CALCIUM 9.9 06/24/2024 0744    ANION 13.0 06/24/2024 0744       GFR:  Lab Results   Component Value Date/Time    IFAFRICA >60 08/23/2021 1014    IFNOTAFR >60 08/23/2021 1014        TSH:  "  Lab Results   Component Value Date/Time    TSHULTRASEN 2.810 06/24/2024 0744       MAGNESIUM:  Lab Results   Component Value Date/Time    MAGNESIUM 1.9 06/24/2024 0744        THYROXINE (T4):   No results found for: \"FREEDIR\"     CBC:   Lab Results   Component Value Date/Time    WBC 8.7 06/24/2024 07:44 AM    WBC 8.4 11/16/2010 12:00 AM    RBC 5.15 06/24/2024 07:44 AM    RBC 5.50 11/16/2010 12:00 AM    HEMOGLOBIN 16.9 06/24/2024 07:44 AM    HEMATOCRIT 47.8 06/24/2024 07:44 AM    MCV 92.8 06/24/2024 07:44 AM    MCV 94 11/16/2010 12:00 AM    MCH 32.8 06/24/2024 07:44 AM    MCH 32.7 11/16/2010 12:00 AM    MCHC 35.4 06/24/2024 07:44 AM    RDW 42.8 06/24/2024 07:44 AM    RDW 13.4 11/16/2010 12:00 AM    PLATELETCT 245 06/24/2024 07:44 AM    MPV 8.6 (L) 06/24/2024 07:44 AM    NEUTSPOLYS 49.50 06/24/2024 07:44 AM    LYMPHOCYTES 39.50 06/24/2024 07:44 AM    MONOCYTES 7.90 06/24/2024 07:44 AM    EOSINOPHILS 2.30 06/24/2024 07:44 AM    BASOPHILS 0.60 06/24/2024 07:44 AM    IMMGRAN 0.20 06/24/2024 07:44 AM    IMMGRAN 0 11/16/2010 12:00 AM    NRBC 0.00 06/24/2024 07:44 AM    NEUTS 4.30 06/24/2024 07:44 AM    NEUTS 5.5 11/16/2010 12:00 AM    LYMPHS 3.44 06/24/2024 07:44 AM    LYMPHS 2.3 11/16/2010 12:00 AM    MONOS 0.69 06/24/2024 07:44 AM    MONOS 0.5 11/16/2010 12:00 AM    EOS 0.20 06/24/2024 07:44 AM    EOS 0.1 11/16/2010 12:00 AM    BASO 0.05 06/24/2024 07:44 AM    BASO 0.0 11/16/2010 12:00 AM    IMMGRANAB 0.02 06/24/2024 07:44 AM    IMMGRANAB 0.0 11/16/2010 12:00 AM    NRBCAB 0.00 06/24/2024 07:44 AM        CBC w/o DIFF  Lab Results   Component Value Date/Time    WBC 8.7 06/24/2024 07:44 AM    WBC 8.4 11/16/2010 12:00 AM    RBC 5.15 06/24/2024 07:44 AM    RBC 5.50 11/16/2010 12:00 AM    HEMOGLOBIN 16.9 06/24/2024 07:44 AM    MCV 92.8 06/24/2024 07:44 AM    MCV 94 11/16/2010 12:00 AM    MCH 32.8 06/24/2024 07:44 AM    MCH 32.7 11/16/2010 12:00 AM    MCHC 35.4 06/24/2024 07:44 AM    RDW 42.8 06/24/2024 07:44 AM    RDW 13.4 " "11/16/2010 12:00 AM    MPV 8.6 (L) 06/24/2024 07:44 AM       LIVER:  Lab Results   Component Value Date/Time    ALKPHOSPHAT 80 06/24/2024 07:44 AM    ASTSGOT 20 06/24/2024 07:44 AM    ALTSGPT 13 06/24/2024 07:44 AM    TBILIRUBIN 0.6 06/24/2024 07:44 AM       BNP:  No results found for: \"BNPBTYPENAT\"    PT/INR:  Lab Results   Component Value Date/Time    PROTHROMBTM 14.8 (H) 09/07/2012 09:20 AM    INR 1.15 (H) 09/07/2012 09:20 AM             Impression/Plan:  Hypertension   - controlled with home pressures   - likely some white coat hypertension   - no new complaints   - follow up as needed              A total of 20 minutes of time was spent on day of encounter reviewing medical record, performing history and examination, counseling, ordering medication/test/consults, collaborating with referring service, and documentation.    Rajeev Sorto [facility]  Cardiac Electrophysiology    "

## 2024-09-24 ENCOUNTER — OFFICE VISIT (OUTPATIENT)
Dept: MEDICAL GROUP | Facility: MEDICAL CENTER | Age: 79
End: 2024-09-24
Payer: MEDICARE

## 2024-09-24 VITALS
HEIGHT: 67 IN | DIASTOLIC BLOOD PRESSURE: 84 MMHG | HEART RATE: 98 BPM | BODY MASS INDEX: 41.21 KG/M2 | SYSTOLIC BLOOD PRESSURE: 140 MMHG | TEMPERATURE: 98 F | WEIGHT: 262.6 LBS | OXYGEN SATURATION: 94 %

## 2024-09-24 DIAGNOSIS — G89.29 OTHER CHRONIC PAIN: ICD-10-CM

## 2024-09-24 DIAGNOSIS — Z23 NEEDS FLU SHOT: ICD-10-CM

## 2024-09-24 DIAGNOSIS — M54.9 BACK PAIN, UNSPECIFIED BACK LOCATION, UNSPECIFIED BACK PAIN LATERALITY, UNSPECIFIED CHRONICITY: ICD-10-CM

## 2024-09-24 DIAGNOSIS — I10 ESSENTIAL HYPERTENSION: ICD-10-CM

## 2024-09-24 DIAGNOSIS — Z86.73 HISTORY OF STROKE: Chronic | ICD-10-CM

## 2024-09-24 DIAGNOSIS — I48.0 PAROXYSMAL ATRIAL FIBRILLATION (HCC): Chronic | ICD-10-CM

## 2024-09-24 DIAGNOSIS — I10 PRIMARY HYPERTENSION: Chronic | ICD-10-CM

## 2024-09-24 DIAGNOSIS — Z00.00 PREVENTATIVE HEALTH CARE: Chronic | ICD-10-CM

## 2024-09-24 DIAGNOSIS — F11.20 OPIATE DEPENDENCE, CONTINUOUS (HCC): ICD-10-CM

## 2024-09-24 DIAGNOSIS — R97.20 ABNORMAL PSA: ICD-10-CM

## 2024-09-24 DIAGNOSIS — E78.5 DYSLIPIDEMIA: ICD-10-CM

## 2024-09-24 DIAGNOSIS — F13.20 SEDATIVE HYPNOTIC OR ANXIOLYTIC DEPENDENCE (HCC): ICD-10-CM

## 2024-09-24 DIAGNOSIS — I48.0 PAF (PAROXYSMAL ATRIAL FIBRILLATION) (HCC): ICD-10-CM

## 2024-09-24 PROCEDURE — 99214 OFFICE O/P EST MOD 30 MIN: CPT | Mod: 25 | Performed by: INTERNAL MEDICINE

## 2024-09-24 PROCEDURE — 3077F SYST BP >= 140 MM HG: CPT | Performed by: INTERNAL MEDICINE

## 2024-09-24 PROCEDURE — 3079F DIAST BP 80-89 MM HG: CPT | Performed by: INTERNAL MEDICINE

## 2024-09-24 RX ORDER — OXYCODONE AND ACETAMINOPHEN 10; 325 MG/1; MG/1
1 TABLET ORAL EVERY 6 HOURS PRN
Qty: 120 TABLET | Refills: 0 | Status: SHIPPED | OUTPATIENT
Start: 2024-11-30 | End: 2024-12-30

## 2024-09-24 RX ORDER — SIMVASTATIN 20 MG
20 TABLET ORAL NIGHTLY
Qty: 100 TABLET | Refills: 2 | Status: SHIPPED | OUTPATIENT
Start: 2024-09-24

## 2024-09-24 RX ORDER — OXYCODONE AND ACETAMINOPHEN 10; 325 MG/1; MG/1
1 TABLET ORAL EVERY 6 HOURS PRN
Qty: 120 TABLET | Refills: 0 | Status: SHIPPED | OUTPATIENT
Start: 2024-10-01 | End: 2024-10-31

## 2024-09-24 RX ORDER — OXYCODONE AND ACETAMINOPHEN 10; 325 MG/1; MG/1
1 TABLET ORAL EVERY 6 HOURS PRN
Qty: 120 TABLET | Refills: 0 | Status: SHIPPED | OUTPATIENT
Start: 2024-10-31 | End: 2024-11-30

## 2024-09-24 RX ORDER — AMLODIPINE BESYLATE 10 MG/1
10 TABLET ORAL DAILY
Qty: 100 TABLET | Refills: 2 | Status: SHIPPED | OUTPATIENT
Start: 2024-09-24

## 2024-09-24 ASSESSMENT — FIBROSIS 4 INDEX: FIB4 SCORE: 1.77

## 2024-09-24 NOTE — PROGRESS NOTES
Subjective     Julio Gonzalez is a 79 y.o. male who presents with med refill Follow-Up            HPI      Patient is here with his wife for med refill currently on Percocet 10 mg 4 times daily for chronic back pain status post lumbar surgery 2012, L5-S1 decompression, foraminotomy, lumbar fusion, L4-S1, continues on chronic Percocet therapy having had seen Dr. Mathias for pain management, previously was up to 150 Percocet per 30 days, tapered down to 120 per 30 days where he currently is at.  Percocet is beneficial for chronic pain control with no side effects of drowsiness, sedation, memory loss, depression, constipation.  Most recent urine drug screen June 24, last opiate contract December 19.  No alcohol with the medication.  Medication allows him to be active and perform his ADLs.  Patient has the Percocet prescription filled at [facility] and Ambien at [facility]  9/1/24 percoet [facility]  8/27/24 bartolo tyler  Chronic insomnia on Ambien unable to taper, medication does not cause drowsiness, sedation, memory loss, mood changes, falls.  No alcohol with the medication.  Blood pressure running 120s/70, recently saw cardiology who advised the patient he can follow-up as needed.  Status post maze procedure, for atrial fibrillation, no chest pain, palpitations, lightheadedness, dizziness.  Blood pressures stable.  Continues on amlodipine 10 mg, losartan 100 mg, simvastatin, potassium 20 mill equivalents 5/day, Maxide 2 tablets daily.  Mood stable on Cymbalta.  Baby aspirin daily.  Medications, allergies, medical history, surgical history, social history, family history  reviewed and updated      Current Outpatient Medications   Medication Sig Dispense Refill    potassium chloride SA (KDUR) 20 MEQ Tab CR TAKE 2 TABLETS BY MOUTH EVERY  MORNING, 1 TABLET AT NOON, AND 2 TABLETS EVERY EVENING. DOSING  CHANGE FROM 2 TABS TWICE A DAY,  ADDING EXTRA DOSE AT MIDDAY. 500 Tablet 2    zolpidem (AMBIEN) 10 MG Tab Take 1 Tablet by mouth  at bedtime as needed for Sleep for up to 90 days. G47.00, #90 per 90 days ok refill 8/27/24 90 Tablet 0    losartan (COZAAR) 100 MG Tab TAKE 1 TABLET BY MOUTH DAILY 100 Tablet 2    triamterene/hctz (MAXZIDE-25/DYAZIDE) 37.5-25 MG Cap TAKE 2 CAPSULES BY MOUTH DAILY 200 Capsule 2    amLODIPine (NORVASC) 10 MG Tab TAKE 1 TABLET BY MOUTH DAILY 100 Tablet 0    oxyCODONE-acetaminophen (PERCOCET)  MG Tab Take 1 Tablet by mouth every 6 hours as needed for Severe Pain (back pain) for up to 30 days. 120 Tablet 0    simvastatin (ZOCOR) 20 MG Tab Take 1 Tablet by mouth every evening. 100 Tablet 1    DULoxetine (CYMBALTA) 60 MG Cap DR Particles delayed-release capsule TAKE ONE CAPSULE BY MOUTH DAILY 90 Capsule 3    naproxen (NAPROSYN) 500 MG Tab TAKE ONE TABLET BY MOUTH TWICE A DAY AS NEEDED FOR PAIN (GENERIC FOR NAPROSYN) 180 Tablet 3    loratadine (CLARITIN) 10 MG Tab       Multiple Vitamins-Minerals (MULTI COMPLETE PO) Take  by mouth.      aspirin (ASA) 81 MG Chew Tab chewable tablet Take 1 Tab by mouth every day. 100 Tab 11    polyethylene glycol/lytes (MIRALAX) PACK Take 17 g by mouth every day.      docusate sodium (COLACE) 100 MG CAPS Take 100 mg by mouth every day.      vitamin D (CHOLECALCIFEROL) 1000 UNIT TABS Take 6,000 Units by mouth every day.       No current facility-administered medications for this visit.           Status post lumbar surgery  2/01 MRI lumbar spine DJD L5-S1 anterolisthesis 4-5 mm marked stenosis saw  neurosurgery  9/09  neurosurgery note  12/09 neurosurgery note, severe left-sided foraminal stenosis, L5-S1 spondylolisthesis 4-5 mm recommend surgery, patient declined, has had epidural with no benefit,tried lyrica and neurontin before, declines cymbalta trial  4/10 note dr. johnson neurosurgery who gives patient norco, he is retiring, and has offered patient surgical therapy versus continuing norco 10 mg which we'll assume dispensing.  10/11 MRI lumbar spine grade 1  spondylolisthesis 9mm, bilateral spondylosis L5, moderate bilateral L5 stenosis, 2.4 cm left kidney cyst  10/11 xray LS, grade 1 spondylolisthesis L5 on S1 increased from prior exam, 1.3 cm, no significant change in flexion  11/11  pain note right L5-S1 transforaminal epidural  1/16/12 start cymbalta trial (3/12 stop cymbalta no benefit)  1/12 dr. murphy neurosurgery note surgical intervention offered L4 to sacrum decompression  3/26/12 restarted cymbalta    9/26/12  neurosurgery operative note decompression at L5-S1and bilateral foraminotomies,transforaminal lumbar interbody fusion, L4-L5 and L5-S1, with expandable cage 8-12 mm.  2/20/13 dr. murphy neurosurgery note, lumbar films, EMG NCS lower ext inconclusive, repeat MRI lumbar spine pending  3/13/13 MRI lumbar spine postoperative changes L4-S1 lumbar laminectomy, interbody fusion, disc space insert L4-L5 L5-S1, posterior spinal fusion and fixation, L5-S1 moderate right foraminal stenosis. 2.4 cm mid right renal cyst unchanged  3/26/13 dr. murphy neurosurgery note, no further surgical intervention necessary, chronic narcotics norco 10 mg 5 per day; I will assume the norco rx from   1/25/15 off naprosyn and cymbalta; on norco 10 mg one every four hours #150  5/21/15 on percocet 10 mg five per day and on cymbalta 60 mg  3/25/16 start lyrica 50 mg tid for fibromyalgia, continue percocet 10 mg 5 per day and cymbalta 60 mg  4/12/16 increase lyrica to 100 mg tid (50 mg two tid), continue cymbalta 60 mg and percocet 10 mg 5 per day  9/26/16 off lyrica, on percocet 10 mg qid per  pain management and cymbalta  7/9/19 on percocet 10 mg #140 per 28 days from  pain management also on cymbalta 60 mg and naprosyn 500 mg bid  10/7/20  pain note on oxycodone #140 per 28 days will decrease to #112 per 28 days  11/4/20  pain note on oxycodone #112 per 28 days   10/7/21 I am taking over percocet refill, 4 per day  #120 per 30 days  12/13/21 percocet 10 mg #120     Status post knee replacement left 2001     Status post hip replacement bilateral  12/03 right total hip replacement  1/04 left total hip replacement      s/p heel surgery  12/09  left foot heel spur surgery     Preventative health  4/6/07 colonoscopy [facility] negative  8/27/15 [facility]  12/12/19 tdap  12/12/19 pneumovax  8/2/21 declines colon  8/13/21 FIT negative  8/24/21 hep c ab negative  10/7/21 shingrix second  12/19/23 declines all vaccines  6/24/24 vit d 87  6/24/24 psa 4.1     Paroxysmal atrial fibrillation  Sees [facility]  2005 s/p maze procedure with a stroke related to that as a complication, have no records at all regarding that, no CT scan or MRI scan and probably had a cardiac catheterization by renal physicians in 2005 that was negative for coronary artery disease  8/11 [facility] note EKG NSR  5/12 [facility] note, on asa daily  11/12 [facility] note  4/29/13 cardiology note  11/3/13 cardiology note on asa  9/22/14 cardiology note sinus, follow up 6 months  5/12/15  cardiology note, paroxysmal atrial fibrillation status post maze operation, sinus rhythm  11/18/15 cardiology note stable follow one year  4/4/17  cardiology note, paroxysmal atrial fibrillation no recurrence, follow-up one year  2/5/19 cardiology note paroxysmal atrial fibrillation, sinus on exam, no recurrence of atrial fibrillation status post maze, follow-up 1 year  6/16/20 cardiology note atrial fibrillation status post maze no recurrence, hypertension stable on current regimen, work on weight loss, follow-up 6 months  7/13/21  cardiology note  1/4/22 declines overnight pulse oximetry  7/6/22 cardiology no changes   7/31/23  cardiology note stable follow up 6 months  9/10/24 cardiology note      neck pain  3/06 MRI cervical spine moderate subdural frontal stenosis C5-C6 lesser extent C6-C7     knee arthritis   9/3/14 x-ray right knee marked  tricompartmental osteoarthritis, most severe medial compartment  2/19/16 has seen  orthopedics, declines knee replacement       Insomnia  2009 on ambien 10 mg  2/19/16 unable to taper ambien, referral to behavioral sleep psychology recommended he declines  9/26/16 on ambien 10 mg work on taper, declines referral to sleep psychology  7/9/18 on ambien declines sleep management referral  7/20/20 taper ambien if possible  12/30/22 contract  6/22/23 UDT  12/19/23 contract  2/26/23 ambien refill  3/26/24 work on ambien taper   5/26/24 ambien refill #90  6/24/24 UDT  8/24/24 ambien refill     Impaired glucose metabolism  11/8/18 A1c 6%  12/6/19 A1c 5.8%  8/24/21 A1c 5.5%  6/19/23 A1c 5.6%  6/24/24 A1c 5.4%      Hypertension  7/10 [facility] note change from hyzaar to enalapril hct 10/25  1/11 [facility] note bp not controlled on enalapril hct 10/25, change back to losartan hct 100/25 and norvasc 10 mg  6/12 K+ 3.0, increase kdur to 20 tid, consider decreasing hctz if possible to decrease K+ supplementation  12/12 cozaar 100 mg, dyazide 37.5/25 mg, kcl 20 meq, norvasc 10 mg  1/29/14 on cozaar 100 mg, dyazide 37.5/25 mg, norvasc 10 mg, klor 20 meq bid; will increase to klor 20 meq tid  9/3//14 K+ 3.1 on klor 20 meq tid, increase to 20 meq two tab bid  9/3/14 urine mac <0.5 on cozaar 100 mg, dyazide 37.5/25 mg, norvasc 10 mg, klor 20 meq two tab bid  6/30/15 Na 132,K+ 3.5 on cozaar 100 mg, dyazide 37.5/25 mg, norvasc 10 mg, klor 20 meq two tab bid  8/31/16 K+ 3.3 on cozaar 100 mg, dyazide 37.5/25 mg, norvasc 10 mg, klor 20 meq two tab qday will increase to 2 bid  7/11/17 K+3.3 on cozaar 100 mg, dyazide 37.5/25 mg, norvasc 10 mg, klor 20 meq two bid  2/5/19 cardiology note paroxysmal atrial fibrillation, sinus on exam, no recurrence of atrial fibrillation status post maze, follow-up 1 year  12/6/19 K+3.5 on cozaar 100 mg, dyazide 37.5/25 mg, norvasc 10 mg, klor 20 meq two bid  6/16/20 cardiology note atrial fibrillation status post  maze no recurrence, hypertension stable on current regimen, work on weight loss, follow-up 6 months  7/13/21  cardiology note on cozaar 100 mg, dyazide 37.5/25 mg, norvasc 10 mg, klor 20 meq two bid  8/24/21 K+ 3.4 on klor 20 meq two bid recommend increase to 2 am, 1 noon, 2 pm  6/22/22 K+ 3.4 on klor 20 meq 2 am, 1 noon, 2 pm on cozaar 100 mg, dyazide 37.5/25 mg, norvasc 10 mg   7/6/22 cardiology note no changes   6/19/23 K+ 3.8 on klor 20 meq 2 am, 1 noon, 2 pm on cozaar 100 mg, dyazide 37.5/25 mg, norvasc 10 mg   12/8/23 sbp running 135-145 recommend add coreg he declines  3/21/24 home blood pressures running 126/73 on klor 20 meq 2 am, 1 noon, 2 pm on cozaar 100 mg, dyazide 37.5/25 mg, norvasc 10 mg    6/25/24 bun 19,creat 0.86,Na 137,K+3.7 on cozaar 100 mg,norvasc 10 mg,dyazide 37.5/25 mg average blood pressure april to june 122/73  9/10/24 cardiology note   9/24/24 on cozaar 100 mg,norvasc 10 mg,dyazide 37.5/25 2 per day,kdur 20 meq 5 per day     History stroke  1999 affected right arm, no old records  9/24/24 on asa 81 mg  Some difficulty with short recall, and occasional diff with expressing self when fatigued     History squamous cell carcinoma  Saw  dermatology offered aldara he declined  3/27/17  dermatology note  5/1/17  dermatology biopsy proven squamous cell carcinoma in situ left forehead, here for mohs  9/19/17  dermatology note continue   12/19/23 declines dermatology follow up     Fibromyalgia  3/25/16 start lyrica 50 mg tid for fibromyalgia, continue percocet 10 mg 5 per day and cymbalta 60 mg  4/12/16 increase lyrica to 100 mg tid (50 mg two tid), continue cymbalta 60 mg and percocet 10 mg 5 per day  4/28/16 increase lyrica to 150 mg tid, continue cymbalta 60 mg and percocet 10 mg five times per day  5/10/16 on lyrica 50 mg three tabs tid, change to 100 mg am, 150 mg noon, 100 mg pm     Dyslipidemia  Followed by cardiology  2/09 cholesterol  135, triglycerides 100, HDL 47, LDL 68  8/09 chol 138,trig 101,hdl 49,ldl 69  3/10 chol 123,trig 66,hdl 49,ldl 61  7/10 [facility] note change vytorin to zocor 40  11/10 chol 141,trig 75,hdl 62,ldl 64 on vytorin 10/20 mg per [facility]  10/11 chol 159,trig 102,hdl 52,ldl 87 on vytorin 10/20 per [facility]  2/29/12 stop vytorin, change to zocor 20 mg per [facility]  6/12 chol 152,trig 102,hdl 49,ldl 83 on zocor 20 mg  6/13 chol 150,trig 130,hdl 52,ldl 72 on zocor 20 mg  1/29/14 chol 147,trig 127,hdl 50,ldl 72 on zocor 20 mg  9/3/14 chol 169,trig 228,hdl 49,ldl 74 on zocor 20 mg  6/30/15 chol 151,trig 88,hdl 63,ldl 70 on zocor 20 mg  8/31/16 chol 169,trig 130,hdl 59,ldl 84 on zocor 20 mg  7/11/17 chol 147,trig 146,hdl 51,ldl 67 on zocor 20 mg  11/8/18 chol 189,trig 215,hdl 54,ldl 92 on zocor 20 mg  12/6/19 chol 163,trig 214,hdl 54,ldl 66 on zocor 20 mg  8/24/21 chol 166,trig 142,hdl 57,ldl 81 on zocor 20 mg  6/22/22 chol 149,trig 139,hdl 48,ldl 73 on zocor 20 mg  6/19/23 chol 153,trig 160,hdl 51,ldl 70 on zocor 20 mg  6/24/24 chol 162,trig 184,hdl 54,ldl 71 on zocor 20 mg  9/10/24 cardiology note follow-up as needed     Depression  3/12 tried cymalta for pain no benefit  12/6/13 PHQ 9 score 15, start cymbalta samples 30 mg x 7 days, then 60 mg  12/20/13 cymbalta 60 mg  1/22/15 off cymbalta    5/21/15 on cymbalta 60 mg  2/19/16 referral to behavioral health recommended he declines  11/29/16 depression screening score 0, continues on cymbalta  1/30/18 depression screening score 0 on cymbalta  8/2/21 on cymbalta screening 0 score   1/4/22 on cymbalta screening 0/3 score  3/16/24 on cymbalta     Chronic opiate  12/19/23 contract  6/24/24 UDT  Has tried hydrocodone, NSAIDs, pregabalin, steroid injections, physical therapy, cymbalta    abn psa  6/19/23 psa 3.3  6/24/24 psa 4.1 declines rectal exam repeat labs ordered                   Patient Active Problem List   Diagnosis    S/P hip replacement    S/p lumbar surgery    Neck pain    Hypertension     "Paroxysmal atrial fibrillation (HCC)    Dyslipidemia    History of squamous cell carcinoma    Preventative health care    S/P knee replacement    Morbid (severe) obesity due to excess calories ([facility])    S/p heel left surgery    History of stroke    Insomnia    Depression, major, in full remission (HCC)    Chronic pain    Knee arthritis    Opiate dependence, continuous (CMS-HCC)    S/P Maze operation for atrial fibrillation    Fibromyalgia    Impaired glucose metabolism    Abnormal PSA                  Patient Care Team:  Norman Peterson M.D. as PCP - General  Normandary Peterson M.D. as PCP - [facility] Paneled  Jose Martin Campbell M.D. as Consulting Physician (Anesthesiology)  Irineo Skinner M.D. as Consulting Physician (Internal Medicine Clinical Cardiac Electrophysiology)  Pete Rangel O.D. as Consulting Physician (Optometry)  Reji Thomas as [facility]       ROS           Objective     BP (!) 140/84 (BP Location: Left arm, Patient Position: Sitting, BP Cuff Size: Large adult)   Pulse 98   Temp 36.7 °C (98 °F) (Temporal)   Ht 1.702 m (5' 7\")   Wt 119 kg (262 lb 9.6 oz)   SpO2 94%   BMI 41.13 kg/m²      Physical Exam  Vitals and nursing note reviewed.   Constitutional:       General: He is not in acute distress.     Appearance: Normal appearance.   HENT:      Head: Normocephalic and atraumatic.      Right Ear: External ear normal.      Left Ear: External ear normal.      Nose: Nose normal.   Eyes:      Conjunctiva/sclera: Conjunctivae normal.   Cardiovascular:      Rate and Rhythm: Normal rate and regular rhythm.      Heart sounds: Normal heart sounds.   Pulmonary:      Effort: Pulmonary effort is normal.      Breath sounds: Normal breath sounds.   Abdominal:      General: There is no distension.   Musculoskeletal:         General: No swelling.      Cervical back: Neck supple.   Skin:     Coloration: Skin is not jaundiced.      Findings: No bruising.   Neurological:      General: No " focal deficit present.      Mental Status: He is alert.   Psychiatric:         Mood and Affect: Mood normal.         Behavior: Behavior normal.                             Assessment & Plan      Assessment  #1 chronic back pain status post lumbar surgery, stable on Percocet 10 mg 4 times daily, has tried hydrocodone, NSAIDs, pregabalin, Cymbalta, has seen physical therapy, has seen pain management, neurosurgery, has had steroid injections, stable and controlled Percocet 10 mg 4 times daily without side effects, last controlled substance contract December 19, last urine drug screen this year june 24th consistent.  Medication is effective controlling his pain allowing him to perform ADLs without side effects    #2 chronic opiate therapy Percocet 10 mg 4 times daily quantity 120 per 30 days having failed other modalities, having seen specialist, and tapered down to his current amount, no drowsiness, sedation, memory loss, mood changes, depression    #3 depression full remission on Cymbalta    #4 hypertension stable on losartan, amlodipine, Dyazide and potassium supplementation    #5 paroxysmal atrial fibrillation status post Maze procedure, sinus rhythm, cardiology indicated he can follow-up as needed, daily aspirin    #6 dyslipidemia on Zocor 6/24/24 chol 162,trig 184,hdl 54,ldl 71 on zocor 20 mg    #7 history of stroke on aspirin 81 mg daily    #8 BMI 41.13 continues to work on good nutrition program limiting sweets and candies    #9 chronic insomnia on Ambien 10 mg nightly unable to taper no daytime drowsiness or hangover effect, no mood changes, no memory loss, no falls    #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 unable to taper understands long-term potential side effects      Plan  #1 health maintenance reviewed and updated    #2 flu vaccine today high-dose    #3 continue Percocet 10 mg 4 times daily: 120 for 30 days, medication is effective without side effects of drowsiness, sedation, memory loss,  mood changes, no alcohol with the medication, understanding long-term risk of habituation, depression, memory loss, falls, mood changes, I believe benefits of long-term use of the medication outweigh potential risks and patient understands potential risks    #4 chronic insomnia continue Ambien understanding long-term risk of habituation, dependence, memory loss, I believe long-term use of hypnotic outweighs potential long-term risk, patient understands long-term risks of memory loss, depression, falling    #5 refill for Percocet next 3 months sent to his pharmacy at [facility]    #6  reviewed and consistent    #7 continue check blood pressures regularly    #8 cardiology has signed off I will refill his medications    #9 work on nutrition, activity, weight loss    #10 follow-up 3 months

## 2024-09-25 PROCEDURE — 90662 IIV NO PRSV INCREASED AG IM: CPT | Performed by: INTERNAL MEDICINE

## 2024-09-25 PROCEDURE — G0008 ADMIN INFLUENZA VIRUS VAC: HCPCS | Performed by: INTERNAL MEDICINE

## 2024-10-22 DIAGNOSIS — M25.561 RIGHT KNEE PAIN, UNSPECIFIED CHRONICITY: ICD-10-CM

## 2024-10-23 RX ORDER — NAPROXEN 500 MG/1
TABLET ORAL
Qty: 180 TABLET | Refills: 3 | Status: SHIPPED | OUTPATIENT
Start: 2024-10-23

## 2024-10-27 DIAGNOSIS — F39 MOOD DISORDER (HCC): ICD-10-CM

## 2024-10-27 RX ORDER — DULOXETIN HYDROCHLORIDE 60 MG/1
60 CAPSULE, DELAYED RELEASE ORAL DAILY
Qty: 90 CAPSULE | Refills: 3 | Status: SHIPPED | OUTPATIENT
Start: 2024-10-27

## 2024-11-12 DIAGNOSIS — G47.00 INSOMNIA, UNSPECIFIED TYPE: ICD-10-CM

## 2024-11-13 DIAGNOSIS — G47.00 INSOMNIA, UNSPECIFIED TYPE: ICD-10-CM

## 2024-11-13 DIAGNOSIS — M25.561 RIGHT KNEE PAIN, UNSPECIFIED CHRONICITY: ICD-10-CM

## 2024-11-13 RX ORDER — ZOLPIDEM TARTRATE 10 MG/1
10 TABLET ORAL
Qty: 90 TABLET | Refills: 0 | Status: SHIPPED | OUTPATIENT
Start: 2024-11-25 | End: 2025-02-23

## 2024-11-13 RX ORDER — ZOLPIDEM TARTRATE 10 MG/1
TABLET ORAL
Qty: 90 TABLET | Refills: 0 | OUTPATIENT
Start: 2024-11-13

## 2024-11-13 RX ORDER — ZOLPIDEM TARTRATE 10 MG/1
10 TABLET ORAL
Qty: 90 TABLET | Refills: 0 | Status: SHIPPED | OUTPATIENT
Start: 2024-11-25 | End: 2024-11-13

## 2024-11-13 NOTE — TELEPHONE ENCOUNTER
Received request via: Pharmacy    Was the patient seen in the last year in this department? Yes    Does the patient have an active prescription (recently filled or refills available) for medication(s) requested? No    [facility]     Does the patient have [facility] and need 100-day supply? (This applies to ALL medications) Yes, quantity updated to 100 days

## 2024-12-11 ENCOUNTER — HOSPITAL ENCOUNTER (OUTPATIENT)
Dept: LAB | Facility: MEDICAL CENTER | Age: 79
End: 2024-12-11
Attending: INTERNAL MEDICINE
Payer: MEDICARE

## 2024-12-11 DIAGNOSIS — R97.20 ABNORMAL PSA: ICD-10-CM

## 2024-12-11 DIAGNOSIS — E78.5 DYSLIPIDEMIA: ICD-10-CM

## 2024-12-11 LAB
ALBUMIN SERPL BCP-MCNC: 4.2 G/DL (ref 3.2–4.9)
ALBUMIN/GLOB SERPL: 1.2 G/DL
ALP SERPL-CCNC: 86 U/L (ref 30–99)
ALT SERPL-CCNC: 17 U/L (ref 2–50)
ANION GAP SERPL CALC-SCNC: 13 MMOL/L (ref 7–16)
AST SERPL-CCNC: 25 U/L (ref 12–45)
BILIRUB SERPL-MCNC: 0.7 MG/DL (ref 0.1–1.5)
BUN SERPL-MCNC: 21 MG/DL (ref 8–22)
CALCIUM ALBUM COR SERPL-MCNC: 10 MG/DL (ref 8.5–10.5)
CALCIUM SERPL-MCNC: 10.2 MG/DL (ref 8.4–10.2)
CHLORIDE SERPL-SCNC: 96 MMOL/L (ref 96–112)
CHOLEST SERPL-MCNC: 147 MG/DL (ref 100–199)
CO2 SERPL-SCNC: 28 MMOL/L (ref 20–33)
CREAT SERPL-MCNC: 1.1 MG/DL (ref 0.5–1.4)
FASTING STATUS PATIENT QL REPORTED: NORMAL
GFR SERPLBLD CREATININE-BSD FMLA CKD-EPI: 69 ML/MIN/1.73 M 2
GLOBULIN SER CALC-MCNC: 3.6 G/DL (ref 1.9–3.5)
GLUCOSE SERPL-MCNC: 129 MG/DL (ref 65–99)
HDLC SERPL-MCNC: 51 MG/DL
LDLC SERPL CALC-MCNC: 67 MG/DL
POTASSIUM SERPL-SCNC: 3.2 MMOL/L (ref 3.6–5.5)
PROT SERPL-MCNC: 7.8 G/DL (ref 6–8.2)
PSA SERPL-MCNC: 5.07 NG/ML (ref 0–4)
SODIUM SERPL-SCNC: 137 MMOL/L (ref 135–145)
TRIGL SERPL-MCNC: 146 MG/DL (ref 0–149)

## 2024-12-11 PROCEDURE — 80061 LIPID PANEL: CPT

## 2024-12-11 PROCEDURE — 84153 ASSAY OF PSA TOTAL: CPT

## 2024-12-11 PROCEDURE — 36415 COLL VENOUS BLD VENIPUNCTURE: CPT

## 2024-12-11 PROCEDURE — 80053 COMPREHEN METABOLIC PANEL: CPT

## 2024-12-15 DIAGNOSIS — G47.00 INSOMNIA, UNSPECIFIED TYPE: ICD-10-CM

## 2024-12-15 DIAGNOSIS — E78.5 DYSLIPIDEMIA: Chronic | ICD-10-CM

## 2024-12-15 DIAGNOSIS — Z00.00 PREVENTATIVE HEALTH CARE: Chronic | ICD-10-CM

## 2024-12-15 DIAGNOSIS — R97.20 ABNORMAL PSA: ICD-10-CM

## 2024-12-15 DIAGNOSIS — F11.20 OPIATE DEPENDENCE, CONTINUOUS (HCC): Chronic | ICD-10-CM

## 2024-12-15 DIAGNOSIS — I10 PRIMARY HYPERTENSION: Chronic | ICD-10-CM

## 2024-12-17 ENCOUNTER — OFFICE VISIT (OUTPATIENT)
Dept: MEDICAL GROUP | Facility: MEDICAL CENTER | Age: 79
End: 2024-12-17
Payer: MEDICARE

## 2024-12-17 VITALS
HEART RATE: 68 BPM | WEIGHT: 259.3 LBS | HEIGHT: 67 IN | BODY MASS INDEX: 40.7 KG/M2 | SYSTOLIC BLOOD PRESSURE: 144 MMHG | TEMPERATURE: 98 F | OXYGEN SATURATION: 94 % | DIASTOLIC BLOOD PRESSURE: 72 MMHG

## 2024-12-17 DIAGNOSIS — G89.29 OTHER CHRONIC PAIN: ICD-10-CM

## 2024-12-17 DIAGNOSIS — M54.9 BACK PAIN, UNSPECIFIED BACK LOCATION, UNSPECIFIED BACK PAIN LATERALITY, UNSPECIFIED CHRONICITY: ICD-10-CM

## 2024-12-17 DIAGNOSIS — I48.0 PAF (PAROXYSMAL ATRIAL FIBRILLATION) (HCC): ICD-10-CM

## 2024-12-17 DIAGNOSIS — R97.20 ABNORMAL PSA: ICD-10-CM

## 2024-12-17 DIAGNOSIS — F13.20 SEDATIVE HYPNOTIC OR ANXIOLYTIC DEPENDENCE (HCC): ICD-10-CM

## 2024-12-17 DIAGNOSIS — I10 HYPERTENSION, UNSPECIFIED TYPE: ICD-10-CM

## 2024-12-17 DIAGNOSIS — F11.20 OPIATE DEPENDENCE, CONTINUOUS (HCC): Chronic | ICD-10-CM

## 2024-12-17 DIAGNOSIS — Z00.00 PREVENTATIVE HEALTH CARE: Chronic | ICD-10-CM

## 2024-12-17 DIAGNOSIS — G47.00 INSOMNIA, UNSPECIFIED TYPE: Chronic | ICD-10-CM

## 2024-12-17 PROCEDURE — 99214 OFFICE O/P EST MOD 30 MIN: CPT | Performed by: INTERNAL MEDICINE

## 2024-12-17 PROCEDURE — 3078F DIAST BP <80 MM HG: CPT | Performed by: INTERNAL MEDICINE

## 2024-12-17 PROCEDURE — 3077F SYST BP >= 140 MM HG: CPT | Performed by: INTERNAL MEDICINE

## 2024-12-17 RX ORDER — POTASSIUM CHLORIDE 1500 MG/1
40 TABLET, EXTENDED RELEASE ORAL 3 TIMES DAILY
Qty: 600 TABLET | Refills: 2 | Status: SHIPPED | OUTPATIENT
Start: 2024-12-17

## 2024-12-17 ASSESSMENT — FIBROSIS 4 INDEX: FIB4 SCORE: 1.93

## 2024-12-17 NOTE — PROGRESS NOTES
Subjective     Julio Gonzalez is a 79 y.o. male who presents with refill medications Follow-Up            HPI      Patient is here with his wife to review recent labs and continue to refill this controlled substance medications.  Continues on oxycodone 10 mg 4 times take 120 per 30 days, most recent refill November 30.  Last controlled substance  contract December 19, 2023, last urine drug screen June 24 on the chronic controlled substance medication for chronic knee pain related to arthritis, chronic back pain status post lumbar surgery 2012 has been seen in the past by neurosurgery, pain management, has tried physical therapy.  Had been receiving Percocet from Dr. Mathias from pain management up until 2021 when he asked that I take over the prescription refill, since his usage of the Percocet had been stable.  At the time I took over prescribing narcotics in October 2021 his dose at that time was the same at 10 mg Percocet tablets per day quantity 120 per 30 days.  The Percocet controls his pain, allowing him to perform his ADLs without side effects, no drowsiness, sedation, memory loss, depression, or constipation with the medication.  No alcohol or illicit drugs with the medication.  Has tried NSAIDs, pregabalin, Cymbalta, hydrocodone, steroid injections and has seen physical therapy, pain management, neurosurgery.  Insomnia chronic unable to taper off Ambien has been on Ambien for over 15 years now on 10 mg nightly, medication does not cause any daytime drowsiness or hangover effect, no mood changes, no alcohol with the medication, last refill quantity 90 on November 25, last urine drug screen June 24, last controlled substance contract December 19 of last year.  Blood pressure has been stable at home, he remains on losartan 100 mg, amlodipine 10 mg, Dyazide 37.5/25 2/day, kdur 20 mg 5/day.  Medications, allergies, medical history, surgical history, social history, family history  reviewed and  updated          Current Outpatient Medications   Medication Sig Dispense Refill    naproxen (NAPROSYN) 500 MG Tab TAKE 1 TABLET BY MOUTH TWICE A  DAY AS NEEDED FOR PAIN 180 Tablet 3    zolpidem (AMBIEN) 10 MG Tab Take 1 Tablet by mouth at bedtime as needed for Sleep for up to 90 days. G47.00, #90 per 90 days ok refill 8/27/24 90 Tablet 0    DULoxetine (CYMBALTA) 60 MG Cap DR Particles delayed-release capsule TAKE 1 CAPSULE BY MOUTH DAILY 90 Capsule 3    amLODIPine (NORVASC) 10 MG Tab Take 1 Tablet by mouth every day. 100 Tablet 2    simvastatin (ZOCOR) 20 MG Tab Take 1 Tablet by mouth every evening. 100 Tablet 2    oxyCODONE-acetaminophen (PERCOCET-10)  MG Tab Take 1 Tablet by mouth every 6 hours as needed for Severe Pain for up to 30 days. 120 Tablet 0    potassium chloride SA (KDUR) 20 MEQ Tab CR TAKE 2 TABLETS BY MOUTH EVERY  MORNING, 1 TABLET AT NOON, AND 2 TABLETS EVERY EVENING. DOSING  CHANGE FROM 2 TABS TWICE A DAY,  ADDING EXTRA DOSE AT MIDDAY. 500 Tablet 2    losartan (COZAAR) 100 MG Tab TAKE 1 TABLET BY MOUTH DAILY 100 Tablet 2    triamterene/hctz (MAXZIDE-25/DYAZIDE) 37.5-25 MG Cap TAKE 2 CAPSULES BY MOUTH DAILY 200 Capsule 2    loratadine (CLARITIN) 10 MG Tab       Multiple Vitamins-Minerals (MULTI COMPLETE PO) Take  by mouth.      aspirin (ASA) 81 MG Chew Tab chewable tablet Take 1 Tab by mouth every day. 100 Tab 11    polyethylene glycol/lytes (MIRALAX) PACK Take 17 g by mouth every day.      docusate sodium (COLACE) 100 MG CAPS Take 100 mg by mouth every day.      vitamin D (CHOLECALCIFEROL) 1000 UNIT TABS Take 6,000 Units by mouth every day.       No current facility-administered medications for this visit.                   Status post lumbar surgery  2/01 MRI lumbar spine DJD L5-S1 anterolisthesis 4-5 mm marked stenosis saw  neurosurgery  9/09  neurosurgery note  12/09 neurosurgery note, severe left-sided foraminal stenosis, L5-S1 spondylolisthesis 4-5 mm recommend surgery,  patient declined, has had epidural with no benefit,tried lyrica and neurontin before, declines cymbalta trial  4/10 note dr. johnson neurosurgery who gives patient norco, he is retiring, and has offered patient surgical therapy versus continuing norco 10 mg which we'll assume dispensing.  10/11 MRI lumbar spine grade 1 spondylolisthesis 9mm, bilateral spondylosis L5, moderate bilateral L5 stenosis, 2.4 cm left kidney cyst  10/11 xray LS, grade 1 spondylolisthesis L5 on S1 increased from prior exam, 1.3 cm, no significant change in flexion  11/11  pain note right L5-S1 transforaminal epidural  1/16/12 start cymbalta trial (3/12 stop cymbalta no benefit)  1/12 dr. murphy neurosurgery note surgical intervention offered L4 to sacrum decompression  3/26/12 restarted cymbalta    9/26/12  neurosurgery operative note decompression at L5-S1and bilateral foraminotomies,transforaminal lumbar interbody fusion, L4-L5 and L5-S1, with expandable cage 8-12 mm.  2/20/13 dr. murphy neurosurgery note, lumbar films, EMG NCS lower ext inconclusive, repeat MRI lumbar spine pending  3/13/13 MRI lumbar spine postoperative changes L4-S1 lumbar laminectomy, interbody fusion, disc space insert L4-L5 L5-S1, posterior spinal fusion and fixation, L5-S1 moderate right foraminal stenosis. 2.4 cm mid right renal cyst unchanged  3/26/13 dr. murphy neurosurgery note, no further surgical intervention necessary, chronic narcotics norco 10 mg 5 per day; I will assume the norco rx from   1/25/15 off naprosyn and cymbalta; on norco 10 mg one every four hours #150  5/21/15 on percocet 10 mg five per day and on cymbalta 60 mg  3/25/16 start lyrica 50 mg tid for fibromyalgia, continue percocet 10 mg 5 per day and cymbalta 60 mg  4/12/16 increase lyrica to 100 mg tid (50 mg two tid), continue cymbalta 60 mg and percocet 10 mg 5 per day  9/26/16 off lyrica, on percocet 10 mg qid per  pain management and cymbalta  7/9/19 on  percocet 10 mg #140 per 28 days from  pain management also on cymbalta 60 mg and naprosyn 500 mg bid  10/7/20  pain note on oxycodone #140 per 28 days will decrease to #112 per 28 days  11/4/20  pain note on oxycodone #112 per 28 days   10/7/21 I am taking over percocet refill, 4 per day #120 per 30 days  12/13/21 percocet 10 mg #120     Status post knee replacement left 2001     Status post hip replacement bilateral  12/03 right total hip replacement  1/04 left total hip replacement      s/p heel surgery  12/09  left foot heel spur surgery     Preventative health  4/6/07 colonoscopy [facility] negative  8/27/15 prevnar at [facility]  12/12/19 tdap  12/12/19 pneumovax  8/2/21 declines colon  8/13/21 FIT negative  8/24/21 hep c ab negative  10/7/21 shingrix second  12/19/23 declines all vaccines  6/24/24 vit d 87  9/24/24 flu   12/11/24 psa 5.0     Paroxysmal atrial fibrillation  Sees [facility]  2005 s/p maze procedure with a stroke related to that as a complication, have no records at all regarding that, no CT scan or MRI scan and probably had a cardiac catheterization by renal physicians in 2005 that was negative for coronary artery disease  8/11 [facility] note EKG NSR  5/12 [facility] note, on asa daily  11/12 [facility] note  4/29/13 cardiology note  11/3/13 cardiology note on asa  9/22/14 cardiology note sinus, follow up 6 months  5/12/15  cardiology note, paroxysmal atrial fibrillation status post maze operation, sinus rhythm  11/18/15 cardiology note stable follow one year  4/4/17  cardiology note, paroxysmal atrial fibrillation no recurrence, follow-up one year  2/5/19 cardiology note paroxysmal atrial fibrillation, sinus on exam, no recurrence of atrial fibrillation status post maze, follow-up 1 year  6/16/20 cardiology note atrial fibrillation status post maze no recurrence, hypertension stable on current regimen, work on weight loss, follow-up 6 months  7/13/21  cardiology  note  1/4/22 declines overnight pulse oximetry  7/6/22 cardiology no changes   7/31/23  cardiology note stable follow up 6 months  9/10/24 cardiology note      neck pain  3/06 MRI cervical spine moderate subdural frontal stenosis C5-C6 lesser extent C6-C7     knee arthritis   9/3/14 x-ray right knee marked tricompartmental osteoarthritis, most severe medial compartment  2/19/16 has seen  orthopedics, declines knee replacement       Insomnia  2009 on ambien 10 mg  2/19/16 unable to taper ambien, referral to behavioral sleep psychology recommended he declines  9/26/16 on ambien 10 mg work on taper, declines referral to sleep psychology  7/9/18 on ambien declines sleep management referral  7/20/20 taper ambien if possible  12/30/22 contract  6/22/23 UDT  12/19/23 contract  2/26/23 ambien refill  3/26/24 work on ambien taper   5/26/24 ambien refill #90  6/24/24 UDT  8/27/24 ambien refill  11/25/24 ambien      Impaired glucose metabolism  11/8/18 A1c 6%  12/6/19 A1c 5.8%  8/24/21 A1c 5.5%  6/19/23 A1c 5.6%  6/24/24 A1c 5.4%      Hypertension  7/10 [facility] note change from hyzaar to enalapril hct 10/25  1/11 [facility] note bp not controlled on enalapril hct 10/25, change back to losartan hct 100/25 and norvasc 10 mg  6/12 K+ 3.0, increase kdur to 20 tid, consider decreasing hctz if possible to decrease K+ supplementation  12/12 cozaar 100 mg, dyazide 37.5/25 mg, kcl 20 meq, norvasc 10 mg  1/29/14 on cozaar 100 mg, dyazide 37.5/25 mg, norvasc 10 mg, klor 20 meq bid; will increase to klor 20 meq tid  9/3//14 K+ 3.1 on klor 20 meq tid, increase to 20 meq two tab bid  9/3/14 urine mac <0.5 on cozaar 100 mg, dyazide 37.5/25 mg, norvasc 10 mg, klor 20 meq two tab bid  6/30/15 Na 132,K+ 3.5 on cozaar 100 mg, dyazide 37.5/25 mg, norvasc 10 mg, klor 20 meq two tab bid  8/31/16 K+ 3.3 on cozaar 100 mg, dyazide 37.5/25 mg, norvasc 10 mg, klor 20 meq two tab qday will increase to 2 bid  7/11/17 K+3.3 on cozaar 100 mg, dyazide  37.5/25 mg, norvasc 10 mg, klor 20 meq two bid  2/5/19 cardiology note paroxysmal atrial fibrillation, sinus on exam, no recurrence of atrial fibrillation status post maze, follow-up 1 year  12/6/19 K+3.5 on cozaar 100 mg, dyazide 37.5/25 mg, norvasc 10 mg, klor 20 meq two bid  6/16/20 cardiology note atrial fibrillation status post maze no recurrence, hypertension stable on current regimen, work on weight loss, follow-up 6 months  7/13/21  cardiology note on cozaar 100 mg, dyazide 37.5/25 mg, norvasc 10 mg, klor 20 meq two bid  8/24/21 K+ 3.4 on klor 20 meq two bid recommend increase to 2 am, 1 noon, 2 pm  6/22/22 K+ 3.4 on klor 20 meq 2 am, 1 noon, 2 pm on cozaar 100 mg, dyazide 37.5/25 mg, norvasc 10 mg   7/6/22 cardiology note no changes   6/19/23 K+ 3.8 on klor 20 meq 2 am, 1 noon, 2 pm on cozaar 100 mg, dyazide 37.5/25 mg, norvasc 10 mg   12/8/23 sbp running 135-145 recommend add coreg he declines  3/21/24 home blood pressures running 126/73 on klor 20 meq 2 am, 1 noon, 2 pm on cozaar 100 mg, dyazide 37.5/25 mg, norvasc 10 mg    6/25/24 bun 19,creat 0.86,Na 137,K+3.7 on cozaar 100 mg,norvasc 10 mg,dyazide 37.5/25 mg average blood pressure april to june 122/73  9/10/24 cardiology note   9/24/24 on cozaar 100 mg,norvasc 10 mg,dyazide 37.5/25 2 per day,kdur 20 meq 5 per day  12/11/24 bun 21,creat 1.1,GFR 69,K+3.2 on cozaar 100 mg,norvasc 10 mg,dyazide 37.5/25 2 per day,kdur 20 meq 5 per day     History stroke  1999 affected right arm, no old records  9/24/24 on asa 81 mg  Some difficulty with short recall, and occasional diff with expressing self when fatigued     History squamous cell carcinoma  Saw  dermatology offered aldara he declined  3/27/17  dermatology note  5/1/17  dermatology biopsy proven squamous cell carcinoma in situ left forehead, here for mohs  9/19/17  dermatology note continue   12/19/23 declines dermatology follow up      Fibromyalgia  3/25/16 start lyrica 50 mg tid for fibromyalgia, continue percocet 10 mg 5 per day and cymbalta 60 mg  4/12/16 increase lyrica to 100 mg tid (50 mg two tid), continue cymbalta 60 mg and percocet 10 mg 5 per day  4/28/16 increase lyrica to 150 mg tid, continue cymbalta 60 mg and percocet 10 mg five times per day  5/10/16 on lyrica 50 mg three tabs tid, change to 100 mg am, 150 mg noon, 100 mg pm     Dyslipidemia  Followed by cardiology  2/09 cholesterol 135, triglycerides 100, HDL 47, LDL 68  8/09 chol 138,trig 101,hdl 49,ldl 69  3/10 chol 123,trig 66,hdl 49,ldl 61  7/10 [facility] note change vytorin to zocor 40  11/10 chol 141,trig 75,hdl 62,ldl 64 on vytorin 10/20 mg per [facility]  10/11 chol 159,trig 102,hdl 52,ldl 87 on vytorin 10/20 per [facility]  2/29/12 stop vytorin, change to zocor 20 mg per [facility]  6/12 chol 152,trig 102,hdl 49,ldl 83 on zocor 20 mg  6/13 chol 150,trig 130,hdl 52,ldl 72 on zocor 20 mg  1/29/14 chol 147,trig 127,hdl 50,ldl 72 on zocor 20 mg  9/3/14 chol 169,trig 228,hdl 49,ldl 74 on zocor 20 mg  6/30/15 chol 151,trig 88,hdl 63,ldl 70 on zocor 20 mg  8/31/16 chol 169,trig 130,hdl 59,ldl 84 on zocor 20 mg  7/11/17 chol 147,trig 146,hdl 51,ldl 67 on zocor 20 mg  11/8/18 chol 189,trig 215,hdl 54,ldl 92 on zocor 20 mg  12/6/19 chol 163,trig 214,hdl 54,ldl 66 on zocor 20 mg  8/24/21 chol 166,trig 142,hdl 57,ldl 81 on zocor 20 mg  6/22/22 chol 149,trig 139,hdl 48,ldl 73 on zocor 20 mg  6/19/23 chol 153,trig 160,hdl 51,ldl 70 on zocor 20 mg  6/24/24 chol 162,trig 184,hdl 54,ldl 71 on zocor 20 mg  9/10/24 cardiology note follow-up as needed  12/11/24 chol 147,trig 146,hdl 51,ldl 67 on zocor 20 mg    Depression  3/12 tried cymalta for pain no benefit  12/6/13 PHQ 9 score 15, start cymbalta samples 30 mg x 7 days, then 60 mg  12/20/13 cymbalta 60 mg  1/22/15 off cymbalta    5/21/15 on cymbalta 60 mg  2/19/16 referral to behavioral health recommended he declines  11/29/16 depression screening score 0,  "continues on cymbalta  1/30/18 depression screening score 0 on cymbalta  8/2/21 on cymbalta screening 0 score   1/4/22 on cymbalta screening 0/3 score  3/16/24 on cymbalta     Chronic pain   12/19/23 contract  6/24/24 UDT  8/27/24 ambien  11/25/24 ambien   10/31/24 oxycodone  11/30/24 oxycodone  Has tried hydrocodone, NSAIDs, pregabalin, steroid injections, physical therapy, cymbalta     abn psa  6/19/23 psa 3.3  6/24/24 psa 4.1 declines rectal exam repeat labs ordered  12/11/24 psa 5.0        Patient Active Problem List   Diagnosis    S/P hip replacement    S/p lumbar surgery    Neck pain    Hypertension    Paroxysmal atrial fibrillation (HCC)    Dyslipidemia    History of squamous cell carcinoma    Preventative health care    S/P knee replacement    Morbid (severe) obesity due to excess calories ([facility])    S/p heel left surgery    History of stroke    Insomnia    Depression, major, in full remission ([facility])    Chronic pain    Knee arthritis    Opiate dependence, continuous ([facility])    S/P Maze operation for atrial fibrillation    Fibromyalgia    Impaired glucose metabolism    Abnormal PSA                  Patient Care Team:  Norman Peterson M.D. as PCP - General  Normandary Peterson M.D. as PCP - [facility]  Jose Martin Campbell M.D. as Consulting Physician (Anesthesiology)  Irineo Skinner M.D. as Consulting Physician (Internal Medicine Clinical Cardiac Electrophysiology)  Pete Rangel O.D. as Consulting Physician (Optometry)  Reji Thomas as [facility]       ROS           Objective     BP (!) 144/72 (BP Location: Left arm, Patient Position: Sitting, BP Cuff Size: Large adult)   Pulse 68   Temp 36.7 °C (98 °F) (Temporal)   Ht 1.702 m (5' 7\")   Wt 118 kg (259 lb 4.8 oz)   SpO2 94%   BMI 40.61 kg/m²      Physical Exam  Vitals and nursing note reviewed.   Constitutional:       Appearance: Normal appearance.   HENT:      Head: Normocephalic and atraumatic.      Right Ear: External ear " normal.      Left Ear: External ear normal.   Eyes:      Conjunctiva/sclera: Conjunctivae normal.   Cardiovascular:      Rate and Rhythm: Normal rate and regular rhythm.      Heart sounds: Normal heart sounds.   Pulmonary:      Effort: Pulmonary effort is normal.      Breath sounds: Normal breath sounds.   Abdominal:      General: There is no distension.   Musculoskeletal:         General: No deformity.      Cervical back: Neck supple.   Skin:     Findings: No bruising.   Neurological:      Mental Status: He is alert.   Psychiatric:         Mood and Affect: Mood normal.         Behavior: Behavior normal.                             Assessment & Plan     Assessment  #1  Chronic back pain related to previous lumbar surgery, on Percocet 10 mg 4 times daily quantity 120 per 30 days, medication controls his pain allowing him to perform his ADLs, without side effects, last controlled substance contract December 19, 2023, most recent urine drug screen June 24 consistent.    #2 chronic opiate use Percocet 10 mg 4/day most recent refill November 30, previously for chronic pain has been seen by pain management, surgery, physical therapy, has tried NSAIDs, pregabalin, Cymbalta, itraconazole and remains on the Percocet.  The chronic opiate therapy does not cause side effects of drowsiness, constipation, memory loss, or mood changes or sedation, does not drink alcohol or take illicit drugs    #3 chronic insomnia on Ambien 10 mg nightly unable to taper off medication but the medication does allow him to get good sleep without side effects the following day    #4 chronic sedative-hypnotic use with Ambien most recent urine drug screen June 24 of this year, last controlled substance contract December 19, 2023, the Ambien does not cause daytime drowsiness, sedation, memory loss, depression, no alcohol with the medication    #5 hypertension stable blood pressures at home on Cozaar, Norvasc, Dyazide, potassium supplementation, unable  to exercise because of his chronic arthritic pains    #6 hypokalemia related to Maxide taking 2/day and on potassium supplementation K-Dur 20 mill colons 5/day despite that low potassium    #7 obesity BMI 40.61, no diabetes with A1c 5.4% in June, limited in exercise because of his back pain    #8 abnormal PSA 5.0 patient has declined rectal exam, previous PSA in June this year 4.1, prior to previous in June 2023 3.3      Plan  #1 continue Percocet 10 mg 4/day, medication has been beneficial without side effects, I believe chronic opiate use benefits outweigh risks of the medication, he has been stable on his current dose, having tried and failed NSAIDs, hydrocodone, pregabalin, Cymbalta, steroid injections, having seen physical therapy, pain management, neurosurgery.  Understanding long-term use of chronic opiate therapy increases risk for habituation, dependence, memory loss, depression    #2  reviewed and consistent    #3 most recent refill oxycodone from November 30th, I will prescribe subsequent 3-month refills to his pharmacy at [facility], same dosing and frequency    #4 continue Ambien 10 mg nightly, understanding long-term use may contribute to memory loss, daytime sedation, depression, patient has not been able to taper and understands long-term risk of chronic sedative-hypnotic therapy particular with combination with chronic opiate therapy intensifying the potential side effects    #6 continue chronic sedative-hypnotic with Ambien and believe benefits outweigh the risks, understanding that as he ages the ability of his body to metabolize the Ambien will decrease and long-term potential side effects will increase over time    #7 controlled substance contract signed by his wife    #8 we discussed his blood sugar being 129 on his most recent lab, however his last A1c in June was normal and he does not meet the criteria for the injectable GLP medications such as Ozempic or Mounjaro and unfortunately he is not a  candidate for Ozempic or Mounjaro since he does not have diabetes based upon his A1c of 5.4% in June.  His cardiologist did mention to him that perhaps he could consider the medication but his insurance will only cover this if his A1c was 6.5% or higher, otherwise he will need to pay out-of-pocket and I believe the current cost of the medication would preclude that.  Alternatively he could try Wegovy or Zepbound however with the same out of pocket expense.  He will continue to work on a good nutrition program limiting sweets and candies in his diet.    #9 also has an elevated PSA I recommend repeating that in 3 months but the patient states that he does not want any more testing for his prostate understanding that the elevated PSA and rate of change or prostate velocity may indicate prostate cancer but he states he would not want evaluation, diagnosis, workup or treatment for prostate cancer, and I agree it would not be necessary to pursue follow-up testing for the PSA since that is his wish    #10 regarding his hypertension and hypokalemia related to Maxide, I did recommend changing to Aldactone 25 mg and discontinuing Maxide and perhaps all of his potassium pills but he declines to make any changes to his current medication regimen. The benefit to discontinue the Maxide and the 5 additional potassium pills a day which now we will have to increase to 6 pills a day, would be changing to 1 spironolactone medication pill a day.  But he would like to continue the Maxide 2/day and increasing to the potassium 6/day and advised him that we will need to recheck his potassium levels in 2 to 4 weeks, lab slip provided    #12 new prescription sent to his pharmacy K-Felisha 20 mg 2 pills 3 times daily or 6 total tablets per day    #13 follow-up 3 months    #14 reviewed labs with patient and his wife from December 11

## 2024-12-21 RX ORDER — OXYCODONE AND ACETAMINOPHEN 10; 325 MG/1; MG/1
1 TABLET ORAL EVERY 6 HOURS PRN
Qty: 120 TABLET | Refills: 0 | Status: SHIPPED | OUTPATIENT
Start: 2025-01-29 | End: 2025-02-28

## 2024-12-21 RX ORDER — OXYCODONE AND ACETAMINOPHEN 10; 325 MG/1; MG/1
1 TABLET ORAL EVERY 6 HOURS PRN
Qty: 120 TABLET | Refills: 0 | Status: SHIPPED | OUTPATIENT
Start: 2025-02-28 | End: 2025-03-30

## 2024-12-21 RX ORDER — OXYCODONE AND ACETAMINOPHEN 10; 325 MG/1; MG/1
1 TABLET ORAL EVERY 6 HOURS PRN
Qty: 120 TABLET | Refills: 0 | Status: SHIPPED | OUTPATIENT
Start: 2024-12-30 | End: 2025-01-29

## 2024-12-22 DIAGNOSIS — I48.0 PAF (PAROXYSMAL ATRIAL FIBRILLATION) (HCC): ICD-10-CM

## 2024-12-22 DIAGNOSIS — I10 ESSENTIAL HYPERTENSION: ICD-10-CM

## 2024-12-22 DIAGNOSIS — M25.561 RIGHT KNEE PAIN, UNSPECIFIED CHRONICITY: ICD-10-CM

## 2024-12-22 RX ORDER — NAPROXEN 500 MG/1
TABLET ORAL
Qty: 200 TABLET | Refills: 3 | Status: SHIPPED | OUTPATIENT
Start: 2024-12-22

## 2025-01-07 ENCOUNTER — HOSPITAL ENCOUNTER (OUTPATIENT)
Dept: LAB | Facility: MEDICAL CENTER | Age: 80
End: 2025-01-07
Attending: INTERNAL MEDICINE
Payer: MEDICARE

## 2025-01-07 DIAGNOSIS — I10 HYPERTENSION, UNSPECIFIED TYPE: ICD-10-CM

## 2025-01-07 LAB
ANION GAP SERPL CALC-SCNC: 13 MMOL/L (ref 7–16)
BUN SERPL-MCNC: 26 MG/DL (ref 8–22)
CALCIUM SERPL-MCNC: 9.6 MG/DL (ref 8.5–10.5)
CHLORIDE SERPL-SCNC: 102 MMOL/L (ref 96–112)
CO2 SERPL-SCNC: 26 MMOL/L (ref 20–33)
CREAT SERPL-MCNC: 1.15 MG/DL (ref 0.5–1.4)
GFR SERPLBLD CREATININE-BSD FMLA CKD-EPI: 65 ML/MIN/1.73 M 2
GLUCOSE SERPL-MCNC: 97 MG/DL (ref 65–99)
POTASSIUM SERPL-SCNC: 4.2 MMOL/L (ref 3.6–5.5)
SODIUM SERPL-SCNC: 141 MMOL/L (ref 135–145)

## 2025-01-07 PROCEDURE — 36415 COLL VENOUS BLD VENIPUNCTURE: CPT

## 2025-01-07 PROCEDURE — 80048 BASIC METABOLIC PNL TOTAL CA: CPT

## 2025-02-20 DIAGNOSIS — G47.00 INSOMNIA, UNSPECIFIED TYPE: ICD-10-CM

## 2025-02-20 RX ORDER — ZOLPIDEM TARTRATE 10 MG/1
10 TABLET ORAL
Qty: 90 TABLET | Refills: 0 | Status: SHIPPED | OUTPATIENT
Start: 2025-02-23 | End: 2025-02-25 | Stop reason: SDUPTHER

## 2025-02-21 RX ORDER — ZOLPIDEM TARTRATE 10 MG/1
10 TABLET ORAL NIGHTLY PRN
Qty: 90 TABLET | Refills: 0 | OUTPATIENT
Start: 2025-02-21

## 2025-02-25 DIAGNOSIS — G47.00 INSOMNIA, UNSPECIFIED TYPE: ICD-10-CM

## 2025-02-25 RX ORDER — ZOLPIDEM TARTRATE 10 MG/1
10 TABLET ORAL
Qty: 90 TABLET | Refills: 0 | Status: SHIPPED | OUTPATIENT
Start: 2025-02-26 | End: 2025-05-27

## 2025-03-17 ENCOUNTER — OFFICE VISIT (OUTPATIENT)
Dept: MEDICAL GROUP | Facility: MEDICAL CENTER | Age: 80
End: 2025-03-17
Payer: MEDICARE

## 2025-03-17 VITALS
HEART RATE: 95 BPM | BODY MASS INDEX: 40.02 KG/M2 | SYSTOLIC BLOOD PRESSURE: 128 MMHG | OXYGEN SATURATION: 92 % | DIASTOLIC BLOOD PRESSURE: 70 MMHG | TEMPERATURE: 97.6 F | HEIGHT: 67 IN | WEIGHT: 255 LBS

## 2025-03-17 DIAGNOSIS — F11.20 OPIATE DEPENDENCE, CONTINUOUS (HCC): ICD-10-CM

## 2025-03-17 DIAGNOSIS — M17.11 ARTHRITIS OF KNEE, RIGHT: ICD-10-CM

## 2025-03-17 DIAGNOSIS — G89.29 OTHER CHRONIC PAIN: ICD-10-CM

## 2025-03-17 DIAGNOSIS — M54.9 BACK PAIN, UNSPECIFIED BACK LOCATION, UNSPECIFIED BACK PAIN LATERALITY, UNSPECIFIED CHRONICITY: ICD-10-CM

## 2025-03-17 DIAGNOSIS — G47.00 INSOMNIA, UNSPECIFIED TYPE: ICD-10-CM

## 2025-03-17 DIAGNOSIS — E66.01 SEVERE OBESITY (HCC): ICD-10-CM

## 2025-03-17 DIAGNOSIS — I48.0 PAROXYSMAL ATRIAL FIBRILLATION (HCC): ICD-10-CM

## 2025-03-17 DIAGNOSIS — E66.01 MORBID (SEVERE) OBESITY DUE TO EXCESS CALORIES (HCC): ICD-10-CM

## 2025-03-17 PROCEDURE — 3074F SYST BP LT 130 MM HG: CPT | Performed by: INTERNAL MEDICINE

## 2025-03-17 PROCEDURE — 99214 OFFICE O/P EST MOD 30 MIN: CPT | Performed by: INTERNAL MEDICINE

## 2025-03-17 PROCEDURE — 3078F DIAST BP <80 MM HG: CPT | Performed by: INTERNAL MEDICINE

## 2025-03-17 RX ORDER — OXYCODONE AND ACETAMINOPHEN 10; 325 MG/1; MG/1
1 TABLET ORAL EVERY 6 HOURS PRN
Qty: 120 TABLET | Refills: 0 | Status: SHIPPED | OUTPATIENT
Start: 2025-03-30 | End: 2025-04-29

## 2025-03-17 RX ORDER — OXYCODONE AND ACETAMINOPHEN 10; 325 MG/1; MG/1
1 TABLET ORAL EVERY 6 HOURS PRN
Qty: 120 TABLET | Refills: 0 | Status: SHIPPED | OUTPATIENT
Start: 2025-05-29 | End: 2025-06-28

## 2025-03-17 RX ORDER — OXYCODONE AND ACETAMINOPHEN 10; 325 MG/1; MG/1
1 TABLET ORAL EVERY 6 HOURS PRN
Qty: 120 TABLET | Refills: 0 | Status: SHIPPED | OUTPATIENT
Start: 2025-04-29 | End: 2025-05-29

## 2025-03-17 RX ORDER — ZOLPIDEM TARTRATE 10 MG/1
10 TABLET ORAL
Qty: 90 TABLET | Refills: 0 | Status: SHIPPED | OUTPATIENT
Start: 2025-05-26 | End: 2025-08-24

## 2025-03-17 ASSESSMENT — FIBROSIS 4 INDEX: FIB4 SCORE: 1.93

## 2025-03-17 NOTE — ASSESSMENT & PLAN NOTE
Orders:    zolpidem (AMBIEN) 10 MG Tab; Take 1 Tablet by mouth at bedtime as needed for Sleep for up to 90 days. G47.00, #90 per 90 days ok refill 2/26/25

## 2025-03-17 NOTE — PROGRESS NOTES
Subjective     Julio Gonzalez is a 79 y.o. male who presents with Follow-Up (3 months fv )            HPI  Patient is here with his wife, follow-up medication refill.  Continues on oxycodone 10 mg every 6 hours as needed for chronic pain, low back pain has had previous L5-S1 surgery, has seen neurosurgery, pain management, physical therapy additionally chronic knee pain has seen orthopedics with severe osteoarthritis on previous x-rays, has tried nsaids naproxen, norco, lyrica, Cymbalta, steroid injections with no benefit continues on oxycodone 10 mg qid as needed pain quantity 120 per 30 days, previously had seen Dr. Mathias pain management, and he was able to taper down from quantity #150 oxycodone 10 mg per 30 days to his current #120 per 30 day regimen.  Medication does not cause drowsiness, sedation, memory loss, depression, no mood changes, no alcohol with the medication.  Most recent refill 2/28/25 #120 oxycodone 10 mg, pain has remained stable and controlled at this dose allowing him to perform his ADLs, no regular exercise.  Tries to limit sweets, candies, processed foods and has been able to lose weight since his last visit, and 7 pounds since last September. Blood pressure has been stable at home, blood pressure readings from January to March once a week running systolic 110-130, diastolics 70s continues on amlodipine 10 mg, losartan 100 mg, Maxide.  Dyslipidemia continues on simvastatin.  History of paroxysmal atrial fibrillation and be followed by cardiology, last September told by cardiology to follow-up as needed, no recurrent chest pain, palpitations.  Chronic insomnia on Ambien 10 mg nightly unable to taper.  Medication does not cause daytime drowsiness, sedation, memory loss, depression.  Patient prefers medications refilled separately, Percocet at [facility], Ambien at [facility] pharmacy.  Last refill zolpidem #90 done on 2/27/25  Medications, allergies, medical history, surgical history, social  There are no preventive care reminders to display for this patient.    Patient is up to date, no discussion needed.     history, family history  reviewed and updated      Current Outpatient Medications   Medication Sig Dispense Refill    zolpidem (AMBIEN) 10 MG Tab Take 1 Tablet by mouth at bedtime as needed for Sleep for up to 90 days. G47.00, #90 per 90 days ok refill 2/26/25 90 Tablet 0    naproxen (NAPROSYN) 500 MG Tab TAKE 1 TABLET BY MOUTH TWICE A  DAY AS NEEDED FOR PAIN 200 Tablet 3    oxyCODONE-acetaminophen (PERCOCET-10)  MG Tab Take 1 Tablet by mouth every 6 hours as needed for Severe Pain for up to 30 days. 120 Tablet 0    potassium chloride SA (KDUR) 20 MEQ Tab CR Take 2 Tablets by mouth 3 times a day. 600 Tablet 2    DULoxetine (CYMBALTA) 60 MG Cap DR Particles delayed-release capsule TAKE 1 CAPSULE BY MOUTH DAILY 90 Capsule 3    amLODIPine (NORVASC) 10 MG Tab Take 1 Tablet by mouth every day. 100 Tablet 2    simvastatin (ZOCOR) 20 MG Tab Take 1 Tablet by mouth every evening. 100 Tablet 2    losartan (COZAAR) 100 MG Tab TAKE 1 TABLET BY MOUTH DAILY 100 Tablet 2    triamterene/hctz (MAXZIDE-25/DYAZIDE) 37.5-25 MG Cap TAKE 2 CAPSULES BY MOUTH DAILY 200 Capsule 2    loratadine (CLARITIN) 10 MG Tab       Multiple Vitamins-Minerals (MULTI COMPLETE PO) Take  by mouth.      aspirin (ASA) 81 MG Chew Tab chewable tablet Take 1 Tab by mouth every day. 100 Tab 11    polyethylene glycol/lytes (MIRALAX) PACK Take 17 g by mouth every day.      docusate sodium (COLACE) 100 MG CAPS Take 100 mg by mouth every day.      vitamin D (CHOLECALCIFEROL) 1000 UNIT TABS Take 6,000 Units by mouth every day.       No current facility-administered medications for this visit.         Status post lumbar surgery  2/01 MRI lumbar spine DJD L5-S1 anterolisthesis 4-5 mm marked stenosis saw  neurosurgery  9/09  neurosurgery note  12/09 neurosurgery note, severe left-sided foraminal stenosis, L5-S1 spondylolisthesis 4-5 mm recommend surgery, patient declined, has had epidural with no benefit,tried lyrica and neurontin before,  declines cymbalta trial  4/10 note dr. johnson neurosurgery who gives patient norco, he is retiring, and has offered patient surgical therapy versus continuing norco 10 mg which we'll assume dispensing.  10/11 MRI lumbar spine grade 1 spondylolisthesis 9mm, bilateral spondylosis L5, moderate bilateral L5 stenosis, 2.4 cm left kidney cyst  10/11 xray LS, grade 1 spondylolisthesis L5 on S1 increased from prior exam, 1.3 cm, no significant change in flexion  11/11  pain note right L5-S1 transforaminal epidural  1/16/12 start cymbalta trial (3/12 stop cymbalta no benefit)  1/12 dr. murphy neurosurgery note surgical intervention offered L4 to sacrum decompression  3/26/12 restarted cymbalta    9/26/12  neurosurgery operative note decompression at L5-S1and bilateral foraminotomies,transforaminal lumbar interbody fusion, L4-L5 and L5-S1, with expandable cage 8-12 mm.  2/20/13 dr. murphy neurosurgery note, lumbar films, EMG NCS lower ext inconclusive, repeat MRI lumbar spine pending  3/13/13 MRI lumbar spine postoperative changes L4-S1 lumbar laminectomy, interbody fusion, disc space insert L4-L5 L5-S1, posterior spinal fusion and fixation, L5-S1 moderate right foraminal stenosis. 2.4 cm mid right renal cyst unchanged  3/26/13 dr. murphy neurosurgery note, no further surgical intervention necessary, chronic narcotics norco 10 mg 5 per day; I will assume the norco rx from   1/25/15 off naprosyn and cymbalta; on norco 10 mg one every four hours #150  5/21/15 on percocet 10 mg five per day and on cymbalta 60 mg  3/25/16 start lyrica 50 mg tid for fibromyalgia, continue percocet 10 mg 5 per day and cymbalta 60 mg  4/12/16 increase lyrica to 100 mg tid (50 mg two tid), continue cymbalta 60 mg and percocet 10 mg 5 per day  9/26/16 off lyrica, on percocet 10 mg qid per  pain management and cymbalta  7/9/19 on percocet 10 mg #140 per 28 days from  pain management also on cymbalta 60 mg  and naprosyn 500 mg bid  10/7/20  pain note on oxycodone #140 per 28 days will decrease to #112 per 28 days  11/4/20  pain note on oxycodone #112 per 28 days   10/7/21 I am taking over percocet refill, 4 per day #120 per 30 days  12/13/21 percocet 10 mg #120     Status post knee replacement left 2001     Status post hip replacement bilateral  12/03 right total hip replacement  1/04 left total hip replacement      s/p heel surgery  12/09  left foot heel spur surgery     Preventative health  4/6/07 colonoscopy [facility] negative  8/27/15 prevnar at [facility]  12/12/19 tdap  12/12/19 pneumovax  8/2/21 declines colon  8/13/21 FIT negative  8/24/21 hep c ab negative  10/7/21 shingrix second  12/19/23 declines all vaccines  6/24/24 vit d 87  9/24/24 flu   12/11/24 psa 5.0 patient declines follow-up psa testing     Paroxysmal atrial fibrillation  Sees [facility]  2005 s/p maze procedure with a stroke related to that as a complication, have no records at all regarding that, no CT scan or MRI scan and probably had a cardiac catheterization by renal physicians in 2005 that was negative for coronary artery disease  8/11 [facility] note EKG NSR  5/12 [facility] note, on asa daily  11/12 [facility] note  4/29/13 cardiology note  11/3/13 cardiology note on asa  9/22/14 cardiology note sinus, follow up 6 months  5/12/15  cardiology note, paroxysmal atrial fibrillation status post maze operation, sinus rhythm  11/18/15 cardiology note stable follow one year  4/4/17  cardiology note, paroxysmal atrial fibrillation no recurrence, follow-up one year  2/5/19 cardiology note paroxysmal atrial fibrillation, sinus on exam, no recurrence of atrial fibrillation status post maze, follow-up 1 year  6/16/20 cardiology note atrial fibrillation status post maze no recurrence, hypertension stable on current regimen, work on weight loss, follow-up 6 months  7/13/21  cardiology note  1/4/22 declines overnight pulse  oximetry  7/6/22 cardiology no changes   7/31/23  cardiology note stable follow up 6 months  9/10/24 cardiology note follow-up as needed     neck pain  3/06 MRI cervical spine moderate subdural frontal stenosis C5-C6 lesser extent C6-C7     knee arthritis   9/3/14 x-ray right knee marked tricompartmental osteoarthritis, most severe medial compartment  2/19/16 has seen  orthopedics, declines knee replacement       Insomnia  2009 on ambien 10 mg  2/19/16 unable to taper ambien, referral to behavioral sleep psychology recommended he declines  9/26/16 on ambien 10 mg work on taper, declines referral to sleep psychology  7/9/18 on ambien declines sleep management referral  7/20/20 taper ambien if possible  12/30/22 contract  6/22/23 UDT  12/19/23 contract  2/26/23 ambien refill  3/26/24 work on ambien taper   5/26/24 ambien refill #90  6/24/24 UDT  8/27/24 ambien refill  11/25/24 ambien   12/17/24 contract  2/23/25 ambien      Impaired glucose metabolism  11/8/18 A1c 6%  12/6/19 A1c 5.8%  8/24/21 A1c 5.5%  6/19/23 A1c 5.6%  6/24/24 A1c 5.4%      Hypertension  7/10 [facility] note change from hyzaar to enalapril hct 10/25  1/11 [facility] note bp not controlled on enalapril hct 10/25, change back to losartan hct 100/25 and norvasc 10 mg  6/12 K+ 3.0, increase kdur to 20 tid, consider decreasing hctz if possible to decrease K+ supplementation  12/12 cozaar 100 mg, dyazide 37.5/25 mg, kcl 20 meq, norvasc 10 mg  1/29/14 on cozaar 100 mg, dyazide 37.5/25 mg, norvasc 10 mg, klor 20 meq bid; will increase to klor 20 meq tid  9/3//14 K+ 3.1 on klor 20 meq tid, increase to 20 meq two tab bid  9/3/14 urine mac <0.5 on cozaar 100 mg, dyazide 37.5/25 mg, norvasc 10 mg, klor 20 meq two tab bid  6/30/15 Na 132,K+ 3.5 on cozaar 100 mg, dyazide 37.5/25 mg, norvasc 10 mg, klor 20 meq two tab bid  8/31/16 K+ 3.3 on cozaar 100 mg, dyazide 37.5/25 mg, norvasc 10 mg, klor 20 meq two tab qday will increase to 2 bid  7/11/17 K+3.3 on cozaar  100 mg, dyazide 37.5/25 mg, norvasc 10 mg, klor 20 meq two bid  2/5/19 cardiology note paroxysmal atrial fibrillation, sinus on exam, no recurrence of atrial fibrillation status post maze, follow-up 1 year  12/6/19 K+3.5 on cozaar 100 mg, dyazide 37.5/25 mg, norvasc 10 mg, klor 20 meq two bid  6/16/20 cardiology note atrial fibrillation status post maze no recurrence, hypertension stable on current regimen, work on weight loss, follow-up 6 months  7/13/21  cardiology note on cozaar 100 mg, dyazide 37.5/25 mg, norvasc 10 mg, klor 20 meq two bid  8/24/21 K+ 3.4 on klor 20 meq two bid recommend increase to 2 am, 1 noon, 2 pm  6/22/22 K+ 3.4 on klor 20 meq 2 am, 1 noon, 2 pm on cozaar 100 mg, dyazide 37.5/25 mg, norvasc 10 mg   7/6/22 cardiology note no changes   6/19/23 K+ 3.8 on klor 20 meq 2 am, 1 noon, 2 pm on cozaar 100 mg, dyazide 37.5/25 mg, norvasc 10 mg   12/8/23 sbp running 135-145 recommend add coreg he declines  3/21/24 home blood pressures running 126/73 on klor 20 meq 2 am, 1 noon, 2 pm on cozaar 100 mg, dyazide 37.5/25 mg, norvasc 10 mg    6/25/24 bun 19,creat 0.86,Na 137,K+3.7 on cozaar 100 mg,norvasc 10 mg,dyazide 37.5/25 mg average blood pressure april to june 122/73  9/10/24 cardiology note   9/24/24 on cozaar 100 mg,norvasc 10 mg,dyazide 37.5/25 2 per day,kdur 20 meq 5 per day  12/11/24 bun 21,creat 1.1,GFR 69,K+3.2 on cozaar 100 mg,norvasc 10 mg,dyazide 37.5/25 2 per day,kdur 20 meq 5 per day  1/7/25 bun 26,creat 1.15,GFR 65,K+4.2 on cozaar 100 mg,norvasc 10 mg,dyazide 37.5/25 2 per day,kdur 20 meq 6 per day     History stroke  1999 affected right arm, no old records  9/24/24 on asa 81 mg  Some difficulty with short recall, and occasional diff with expressing self when fatigued     History squamous cell carcinoma  Saw  dermatology offered aldara he declined  3/27/17  dermatology note  5/1/17  dermatology biopsy proven squamous cell carcinoma in situ left  forehead, here for mohs  9/19/17  dermatology note continue   12/19/23 declines dermatology follow up     Fibromyalgia  3/25/16 start lyrica 50 mg tid for fibromyalgia, continue percocet 10 mg 5 per day and cymbalta 60 mg  4/12/16 increase lyrica to 100 mg tid (50 mg two tid), continue cymbalta 60 mg and percocet 10 mg 5 per day  4/28/16 increase lyrica to 150 mg tid, continue cymbalta 60 mg and percocet 10 mg five times per day  5/10/16 on lyrica 50 mg three tabs tid, change to 100 mg am, 150 mg noon, 100 mg pm     Dyslipidemia  Followed by cardiology  2/09 cholesterol 135, triglycerides 100, HDL 47, LDL 68  8/09 chol 138,trig 101,hdl 49,ldl 69  3/10 chol 123,trig 66,hdl 49,ldl 61  7/10 [facility] note change vytorin to zocor 40  11/10 chol 141,trig 75,hdl 62,ldl 64 on vytorin 10/20 mg per [facility]  10/11 chol 159,trig 102,hdl 52,ldl 87 on vytorin 10/20 per [facility]  2/29/12 stop vytorin, change to zocor 20 mg per [facility]  6/12 chol 152,trig 102,hdl 49,ldl 83 on zocor 20 mg  6/13 chol 150,trig 130,hdl 52,ldl 72 on zocor 20 mg  1/29/14 chol 147,trig 127,hdl 50,ldl 72 on zocor 20 mg  9/3/14 chol 169,trig 228,hdl 49,ldl 74 on zocor 20 mg  6/30/15 chol 151,trig 88,hdl 63,ldl 70 on zocor 20 mg  8/31/16 chol 169,trig 130,hdl 59,ldl 84 on zocor 20 mg  7/11/17 chol 147,trig 146,hdl 51,ldl 67 on zocor 20 mg  11/8/18 chol 189,trig 215,hdl 54,ldl 92 on zocor 20 mg  12/6/19 chol 163,trig 214,hdl 54,ldl 66 on zocor 20 mg  8/24/21 chol 166,trig 142,hdl 57,ldl 81 on zocor 20 mg  6/22/22 chol 149,trig 139,hdl 48,ldl 73 on zocor 20 mg  6/19/23 chol 153,trig 160,hdl 51,ldl 70 on zocor 20 mg  6/24/24 chol 162,trig 184,hdl 54,ldl 71 on zocor 20 mg  9/10/24 cardiology note follow-up as needed  12/11/24 chol 147,trig 146,hdl 51,ldl 67 on zocor 20 mg     Depression  3/12 tried cymalta for pain no benefit  12/6/13 PHQ 9 score 15, start cymbalta samples 30 mg x 7 days, then 60 mg  12/20/13 cymbalta 60 mg  1/22/15 off cymbalta    5/21/15 on cymbalta  60 mg  2/19/16 referral to behavioral health recommended he declines  11/29/16 depression screening score 0, continues on cymbalta  1/30/18 depression screening score 0 on cymbalta  8/2/21 on cymbalta screening 0 score   1/4/22 on cymbalta screening 0/3 score  3/16/24 on cymbalta     Chronic pain   6/24/24 UDT  10/31/24 oxycodone  11/30/24 oxycodone  12/17/24 contract  Has tried hydrocodone, NSAIDs, pregabalin, steroid injections, physical therapy, cymbalta     abn psa  6/19/23 psa 3.3  6/24/24 psa 4.1 declines rectal exam repeat labs ordered  12/11/24 psa 5.0                    Patient Active Problem List   Diagnosis    S/P hip replacement    S/p lumbar surgery    Neck pain    Hypertension    Paroxysmal atrial fibrillation ([facility])    Dyslipidemia    History of squamous cell carcinoma    Preventative health care    S/P knee replacement    Morbid (severe) obesity due to excess calories ([facility])    S/p heel left surgery    History of stroke    Insomnia    Depression, major, in full remission ([facility])    Chronic pain    Knee arthritis    Opiate dependence, continuous ([facility])    S/P Maze operation for atrial fibrillation    Fibromyalgia    Impaired glucose metabolism    Abnormal PSA          Fall Risk Assessment  Has the patient had two or more falls in the last year or any fall with injury in the last year?  No          Health Maintenance Summary            Current Care Gaps       Annual Wellness Visit (Yearly) Overdue since 11/29/2017 11/29/2016  Visit Dx: Medicare annual wellness visit, subsequent    09/16/2014  Done    12/06/2013  Done                      Upcoming       COVID-19 Vaccine (4 - 2024-25 season) Postponed until 12/15/2025      10/14/2021  Imm Admin: PFIZER PURPLE CAP SARS-COV-2 VACCINATION (12+)    02/27/2021  Imm Admin: PFIZER PURPLE CAP SARS-COV-2 VACCINATION (12+)    02/03/2021  Imm Admin: PFIZER PURPLE CAP SARS-COV-2 VACCINATION (12+)              IMM DTaP/Tdap/Td Vaccine (2 - Td or Tdap) Next due  on 12/12/2029 12/12/2019  Imm Admin: Tdap Vaccine    08/05/2011  Imm Admin: TD Vaccine                      Completed or No Longer Recommended       Influenza Vaccine (Series Information) Completed      09/25/2024  Imm Admin: Influenza high-dose trivalent (PF)    09/26/2022  Imm Admin: Influenza Vaccine Adult HD    10/14/2021  Imm Admin: Influenza, Unspecified - HISTORICAL DATA    12/12/2019  Imm Admin: Influenza Vaccine Adult HD    01/08/2019  Imm Admin: Influenza Vaccine Adult HD     Only the first 5 history entries have been loaded, but more history exists.            Zoster (Shingles) Vaccines (Series Information) Completed      10/07/2021  Imm Admin: Zoster Vaccine Recombinant (RZV) (SHINGRIX)    02/05/2019  Imm Admin: Zoster Vaccine Recombinant (RZV) (SHINGRIX)    10/26/2010  Imm Admin: Zoster Vaccine Live (ZVL) (Zostavax) - HISTORICAL DATA              Hepatitis C Screening  Completed      09/20/2023  Done    08/23/2021  Hepatitis C Antibody component of HEP C VIRUS ANTIBODY              Pneumococcal Vaccine: 50+ Years (Series Information) Completed      06/22/2023  Imm Admin: Pneumococcal Conjugate Vaccine (PCV20)    12/12/2019  Imm Admin: Pneumococcal polysaccharide vaccine (PPSV-23)    08/27/2015  Imm Admin: Pneumococcal Conjugate Vaccine (Prevnar/PCV-13)    09/17/2009  Imm Admin: Pneumococcal polysaccharide vaccine (PPSV-23)    09/17/2009  Imm Admin: Pneumococcal Vaccine (UF) - HISTORICAL DATA      Only the first 5 history entries have been loaded, but more history exists.              Hepatitis A Vaccine (Hep A) (Series Information) Aged Out      No completion history exists for this topic.              HPV Vaccines (Series Information) Aged Out     No completion history exists for this topic.              Polio Vaccine (Inactivated Polio) (Series Information) Aged Out     No completion history exists for this topic.              Meningococcal Immunization (Series Information) Aged Out     No  "completion history exists for this topic.              Colorectal Cancer Screening  Discontinued        Frequency changed to Never automatically (Topic No Longer Applies)    08/06/2021  OCCULT BLOOD FECES IMMUNOASSAY    07/07/2020  OCCULT BLOOD FECES IMMUNOASSAY    12/31/2018  OCCULT BLOOD FECES IMMUNOASSAY    11/12/2017  OCCULT BLOOD FECES IMMUNOASSAY     Only the first 5 history entries have been loaded, but more history exists.                          Patient Care Team:  Norman Peterson M.D. as PCP - General  Norman Peterson M.D. as PCP - [facility] Paneled  Jose Martin Campbell M.D. as Consulting Physician (Anesthesiology)  Irineo Skinner M.D. as Consulting Physician (Internal Medicine Clinical Cardiac Electrophysiology)  Pete Rangel O.D. as Consulting Physician (Optometry)  Reji Thomas as [facility]         ROS           Objective     /70   Pulse 95   Temp 36.4 °C (97.6 °F) (Temporal)   Ht 1.702 m (5' 7\")   Wt 116 kg (255 lb)   SpO2 92%   BMI 39.94 kg/m²      Physical Exam  Vitals and nursing note reviewed.   Constitutional:       Appearance: Normal appearance.   HENT:      Head: Normocephalic and atraumatic.      Right Ear: External ear normal.      Left Ear: External ear normal.      Nose: Nose normal.   Eyes:      Conjunctiva/sclera: Conjunctivae normal.   Cardiovascular:      Rate and Rhythm: Normal rate and regular rhythm.      Heart sounds: Normal heart sounds.   Pulmonary:      Effort: Pulmonary effort is normal.      Breath sounds: Normal breath sounds.   Abdominal:      General: There is no distension.   Musculoskeletal:         General: No swelling.      Cervical back: Neck supple.   Skin:     Coloration: Skin is not jaundiced.      Findings: No bruising.   Neurological:      General: No focal deficit present.      Mental Status: He is alert.   Psychiatric:         Mood and Affect: Mood normal.         Behavior: Behavior normal.                  Assessment & " Plan       Assessment  #1 chronic low back pain status post lumbar surgery, has seen neurosurgery, pain management, physical therapy, has been stable and controlled on oxycodone 10 mg qid without side effects, medication allows him to perform his ADLs, without drowsiness, sedation, memory loss, constipation, depression, mood changes, no alcohol with the medication    #2 chronic opiate therapy Percocet 10 mg 4 times daily #120, has seen pain management and has been able to taper down to 4 per day from 5 per day, pain is stable and controlled on the medication without side effects    #3 hypertension stable on amlodipine and losartan    #4 paroxysmal atrial fibrillation no recurrence of symptoms, status post maze surgery, has seen cardiology, no anticoagulation, aspirin a day per cardiology    #5 dyslipidemia 12/11/24 chol 147,trig 146,hdl 51,ldl 67 on zocor 20 mg    #6 chronic insomnia on zolpidem 10 mg nightly without daytime drowsiness or hangover effect, unable to taper off the medication    #7 chronic sedative-hypnotic therapy with zolpidem #90 per 90 days no memory loss, no mood changes, no alcohol with the medication, no somnolence during the day    #8 BMI 39.34 obesity with comorbidities hypertension, dyslipidemia, working on nutrition, has lost 7 pounds since September, has declined nutrition program      Plan  #1 continue percocet 10 mg 4 times daily #120 to 30 days, medication has been effective for chronic low back pain, having seen neurosurgery, physical therapy, pain management has been able to taper down to #120 per 30 days no side effects of drowsiness, sedation, memory loss, depression, constipation    #2 chronic sedative hypnotic and opiate therapy does not cause drowsiness, sedation, memory loss, depression, I believe benefits of chronic opiate therapy outweigh potential risks of habituation, dependence, memory loss, continue no alcohol, morphine equivalent 60 stable    #3 up-to-date on controlled  substance contract and urine drug screen    #4  reviewed and consistent    #5 refill percocet 10 mg 4 times daily #120 per 30 days, most recent refill 2/28/25 for next 3 months, next will be due March 30th, then April 29, then May 29th at [facility], refill sent for those particular dates    #6 continue amlodipine losartan check blood pressures periodically, continue work on a good nutrition program, try to increase activity    #7 continue zolpidem 10 mg #90 per 90 days, refill to [facility] per patient request, next will be due May 26, [facility]    #8 long-term Ambien increases risk for memory loss, depression, continue work on sleep hygiene, patient understands potential long-term risk, I believe benefits of good sleep outweigh potential side effects of habituation, dependence, memory loss with the ambien     #9 follow-up 3 months

## 2025-03-20 ENCOUNTER — PATIENT MESSAGE (OUTPATIENT)
Dept: HEALTH INFORMATION MANAGEMENT | Facility: OTHER | Age: 80
End: 2025-03-20

## 2025-05-27 DIAGNOSIS — G47.00 INSOMNIA, UNSPECIFIED TYPE: ICD-10-CM

## 2025-05-27 RX ORDER — ZOLPIDEM TARTRATE 10 MG/1
10 TABLET ORAL
Qty: 90 TABLET | Refills: 0 | Status: SHIPPED | OUTPATIENT
Start: 2025-05-27 | End: 2025-08-25

## 2025-06-09 DIAGNOSIS — I48.0 PAF (PAROXYSMAL ATRIAL FIBRILLATION) (HCC): ICD-10-CM

## 2025-06-09 DIAGNOSIS — I10 ESSENTIAL HYPERTENSION: ICD-10-CM

## 2025-06-09 RX ORDER — LOSARTAN POTASSIUM 100 MG/1
100 TABLET ORAL DAILY
Qty: 100 TABLET | Refills: 2 | Status: SHIPPED | OUTPATIENT
Start: 2025-06-09

## 2025-06-17 ENCOUNTER — OFFICE VISIT (OUTPATIENT)
Dept: MEDICAL GROUP | Facility: MEDICAL CENTER | Age: 80
End: 2025-06-17
Payer: MEDICARE

## 2025-06-17 DIAGNOSIS — F11.20 OPIATE DEPENDENCE, CONTINUOUS (HCC): ICD-10-CM

## 2025-06-17 DIAGNOSIS — E78.5 DYSLIPIDEMIA: Primary | Chronic | ICD-10-CM

## 2025-06-17 DIAGNOSIS — I48.0 PAROXYSMAL ATRIAL FIBRILLATION (HCC): Chronic | ICD-10-CM

## 2025-06-17 DIAGNOSIS — R73.09 IMPAIRED GLUCOSE METABOLISM: ICD-10-CM

## 2025-06-17 DIAGNOSIS — I48.0 PAF (PAROXYSMAL ATRIAL FIBRILLATION) (HCC): ICD-10-CM

## 2025-06-17 DIAGNOSIS — E66.01 MORBID (SEVERE) OBESITY DUE TO EXCESS CALORIES (HCC): ICD-10-CM

## 2025-06-17 DIAGNOSIS — E55.9 VITAMIN D DEFICIENCY: ICD-10-CM

## 2025-06-17 DIAGNOSIS — G47.00 INSOMNIA, UNSPECIFIED TYPE: ICD-10-CM

## 2025-06-17 DIAGNOSIS — I10 PRIMARY HYPERTENSION: Chronic | ICD-10-CM

## 2025-06-17 DIAGNOSIS — E66.01 SEVERE OBESITY (HCC): ICD-10-CM

## 2025-06-17 DIAGNOSIS — G89.29 OTHER CHRONIC PAIN: ICD-10-CM

## 2025-06-17 DIAGNOSIS — M54.9 BACK PAIN, UNSPECIFIED BACK LOCATION, UNSPECIFIED BACK PAIN LATERALITY, UNSPECIFIED CHRONICITY: ICD-10-CM

## 2025-06-17 DIAGNOSIS — F13.20 SEDATIVE HYPNOTIC OR ANXIOLYTIC DEPENDENCE (HCC): ICD-10-CM

## 2025-06-17 PROCEDURE — 99214 OFFICE O/P EST MOD 30 MIN: CPT | Performed by: INTERNAL MEDICINE

## 2025-06-17 PROCEDURE — 3078F DIAST BP <80 MM HG: CPT | Performed by: INTERNAL MEDICINE

## 2025-06-17 PROCEDURE — 3074F SYST BP LT 130 MM HG: CPT | Performed by: INTERNAL MEDICINE

## 2025-06-17 ASSESSMENT — PATIENT HEALTH QUESTIONNAIRE - PHQ9
8. MOVING OR SPEAKING SO SLOWLY THAT OTHER PEOPLE COULD HAVE NOTICED. OR THE OPPOSITE, BEING SO FIGETY OR RESTLESS THAT YOU HAVE BEEN MOVING AROUND A LOT MORE THAN USUAL: NOT AT ALL
7. TROUBLE CONCENTRATING ON THINGS, SUCH AS READING THE NEWSPAPER OR WATCHING TELEVISION: NOT AT ALL
4. FEELING TIRED OR HAVING LITTLE ENERGY: SEVERAL DAYS
9. THOUGHTS THAT YOU WOULD BE BETTER OFF DEAD, OR OF HURTING YOURSELF: NOT AT ALL
2. FEELING DOWN, DEPRESSED, IRRITABLE, OR HOPELESS: NOT AT ALL
3. TROUBLE FALLING OR STAYING ASLEEP OR SLEEPING TOO MUCH: NOT AT ALL
6. FEELING BAD ABOUT YOURSELF - OR THAT YOU ARE A FAILURE OR HAVE LET YOURSELF OR YOUR FAMILY DOWN: NOT AL ALL
SUM OF ALL RESPONSES TO PHQ9 QUESTIONS 1 AND 2: 0
SUM OF ALL RESPONSES TO PHQ QUESTIONS 1-9: 1
5. POOR APPETITE OR OVEREATING: NOT AT ALL
1. LITTLE INTEREST OR PLEASURE IN DOING THINGS: NOT AT ALL

## 2025-06-17 ASSESSMENT — FIBROSIS 4 INDEX: FIB4 SCORE: 1.93

## 2025-06-17 NOTE — ASSESSMENT & PLAN NOTE
Orders:    CBC WITH DIFFERENTIAL; Future    Comp Metabolic Panel; Future    TSH; Future    Lipoprotein (a); Future    Lipid Profile; Future    APOLIPOPROTEIN B; Future

## 2025-06-17 NOTE — PROGRESS NOTES
Subjective     Julio Gonzalez is a 79 y.o. male who presents with med refill               HPI    Patient here with his wife for medication refill, chronic back pain status post previous lumbar surgery decompression in 2012, knee replacement, right and left hip replacement, continues on oxycodone 10 mg, taking 4/day quantity 120 per 30 days  Most recent Ambien refills  6/24/24 UDT  8/27/24 ambien refill  11/25/24 ambien   12/17/24 contract  2/23/25 ambien   5/27/25 ambien #90  Most recent oxycodone refills  6/24/24 UDT  10/31/24 oxycodone  11/30/24 oxycodone  12/17/24 contract  12/30/24 oxycodone  1/29/25 oxycodone  2/28/25 oxycodone#120  3/30/25 oxycodone#120  4/29/25 oxycodone#120  5/29/25 oxycodone#120 MME 60  Has seen pain management in the past, Dr. Mathias, plan assumed prescribing his oxycodone as of October 2021 and his usage has remained the same, oxycodone 10 mg 4/day is what he was taking by pain management.  Unable to taper to lower frequency or dose.  Medication is effective controlling his chronic joint pains without side effects of drowsiness, sedation, memory loss, mood change or depression, no alcohol with the medication.  Chronic Ambien unable to taper, medication allows him to get good sleep without daytime drowsiness, sedation, memory loss, depression, again no alcohol with the medication.  Blood pressure has been stable at home, continues on Losartan 100 mg, amlodipine 10 mg, Dyazide 37.5/25.2, Kdur 20 meq 6/day.  He has declined to change from Dyazide and K-Dur to Aldactone, he does not mind taking Kdur 6 tabs a day as that does not cause GI upset.  He has been trying to work on a good nutrition program and has lost weight about 8 pounds on a scale.  Does not do any regular exercise because of his chronic joint pains.  Medications, allergies, medical history, surgical history, social history, family history  reviewed and updated        Current Medications[1]         Status post lumbar  surgery  2/01 MRI lumbar spine DJD L5-S1 anterolisthesis 4-5 mm marked stenosis saw  neurosurgery  9/09  neurosurgery note  12/09 neurosurgery note, severe left-sided foraminal stenosis, L5-S1 spondylolisthesis 4-5 mm recommend surgery, patient declined, has had epidural with no benefit,tried lyrica and neurontin before, declines cymbalta trial  4/10 note dr. johnson neurosurgery who gives patient norco, he is retiring, and has offered patient surgical therapy versus continuing norco 10 mg which we'll assume dispensing.  10/11 MRI lumbar spine grade 1 spondylolisthesis 9mm, bilateral spondylosis L5, moderate bilateral L5 stenosis, 2.4 cm left kidney cyst  10/11 xray LS, grade 1 spondylolisthesis L5 on S1 increased from prior exam, 1.3 cm, no significant change in flexion  11/11  pain note right L5-S1 transforaminal epidural  1/16/12 start cymbalta trial (3/12 stop cymbalta no benefit)  1/12 dr. murphy neurosurgery note surgical intervention offered L4 to sacrum decompression  3/26/12 restarted cymbalta    9/26/12  neurosurgery operative note decompression at L5-S1and bilateral foraminotomies,transforaminal lumbar interbody fusion, L4-L5 and L5-S1, with expandable cage 8-12 mm.  2/20/13 dr. murphy neurosurgery note, lumbar films, EMG NCS lower ext inconclusive, repeat MRI lumbar spine pending  3/13/13 MRI lumbar spine postoperative changes L4-S1 lumbar laminectomy, interbody fusion, disc space insert L4-L5 L5-S1, posterior spinal fusion and fixation, L5-S1 moderate right foraminal stenosis. 2.4 cm mid right renal cyst unchanged  3/26/13 dr. murphy neurosurgery note, no further surgical intervention necessary, chronic narcotics norco 10 mg 5 per day; I will assume the norco rx from   1/25/15 off naprosyn and cymbalta; on norco 10 mg one every four hours #150  5/21/15 on percocet 10 mg five per day and on cymbalta 60 mg  3/25/16 start lyrica 50 mg tid for fibromyalgia,  continue percocet 10 mg 5 per day and cymbalta 60 mg  4/12/16 increase lyrica to 100 mg tid (50 mg two tid), continue cymbalta 60 mg and percocet 10 mg 5 per day  9/26/16 off lyrica, on percocet 10 mg qid per  pain management and cymbalta  7/9/19 on percocet 10 mg #140 per 28 days from  pain management also on cymbalta 60 mg and naprosyn 500 mg bid  10/7/20  pain note on oxycodone #140 per 28 days will decrease to #112 per 28 days  11/4/20  pain note on oxycodone #112 per 28 days   10/7/21 I am taking over percocet refill, 4 per day #120 per 30 days  12/13/21 percocet 10 mg #120     Status post knee replacement left 2001     Status post hip replacement bilateral  12/03 right total hip replacement  1/04 left total hip replacement      s/p heel surgery  12/09  left foot heel spur surgery     Preventative health  4/6/07 colonoscopy [facility] negative  8/27/15 [facility] [place]  12/12/19 tdap  12/12/19 pneumovax  8/2/21 declines colon  8/13/21 FIT negative  8/24/21 hep c ab negative  10/7/21 shingrix second  12/19/23 declines all vaccines  6/24/24 vit d 87  9/24/24 flu   12/11/24 psa 5.0 patient declines follow-up psa testing     Paroxysmal atrial fibrillation  Sees [facility]  2005 s/p maze procedure with a stroke related to that as a complication, have no records at all regarding that, no CT scan or MRI scan and probably had a cardiac catheterization by renal physicians in 2005 that was negative for coronary artery disease  8/11 [facility] note EKG NSR  5/12 [facility] note, on asa daily  11/12 [facility] note  4/29/13 cardiology note  11/3/13 cardiology note on asa  9/22/14 cardiology note sinus, follow up 6 months  5/12/15  cardiology note, paroxysmal atrial fibrillation status post maze operation, sinus rhythm  11/18/15 cardiology note stable follow one year  4/4/17  cardiology note, paroxysmal atrial fibrillation no recurrence, follow-up one year  2/5/19 cardiology note  paroxysmal atrial fibrillation, sinus on exam, no recurrence of atrial fibrillation status post maze, follow-up 1 year  6/16/20 cardiology note atrial fibrillation status post maze no recurrence, hypertension stable on current regimen, work on weight loss, follow-up 6 months  7/13/21  cardiology note  1/4/22 declines overnight pulse oximetry  7/6/22 cardiology no changes   7/31/23  cardiology note stable follow up 6 months  9/10/24 cardiology note EKG sinus with pac follow-up as needed      neck pain  3/06 MRI cervical spine moderate subdural frontal stenosis C5-C6 lesser extent C6-C7     knee arthritis   9/3/14 x-ray right knee marked tricompartmental osteoarthritis, most severe medial compartment  2/19/16 has seen  orthopedics, declines knee replacement       Insomnia  2009 on ambien 10 mg  2/19/16 unable to taper ambien, referral to behavioral sleep psychology recommended he declines  9/26/16 on ambien 10 mg work on taper, declines referral to sleep psychology  7/9/18 on ambien declines sleep management referral  7/20/20 taper ambien if possible  12/30/22 contract  6/22/23 UDT  12/19/23 contract  2/26/23 ambien refill  3/26/24 work on ambien taper   5/26/24 ambien refill #90  6/24/24 UDT  8/27/24 ambien refill  11/25/24 ambien   12/17/24 contract  2/23/25 ambien      Impaired glucose metabolism  11/8/18 A1c 6%  12/6/19 A1c 5.8%  8/24/21 A1c 5.5%  6/19/23 A1c 5.6%  6/24/24 A1c 5.4%      Hypertension  7/10 [facility] note change from hyzaar to enalapril hct 10/25  1/11 [facility] note bp not controlled on enalapril hct 10/25, change back to losartan hct 100/25 and norvasc 10 mg  6/12 K+ 3.0, increase kdur to 20 tid, consider decreasing hctz if possible to decrease K+ supplementation  12/12 cozaar 100 mg, dyazide 37.5/25 mg, kcl 20 meq, norvasc 10 mg  1/29/14 on cozaar 100 mg, dyazide 37.5/25 mg, norvasc 10 mg, klor 20 meq bid; will increase to klor 20 meq tid  9/3//14 K+ 3.1 on klor 20 meq tid, increase  to 20 meq two tab bid  9/3/14 urine mac <0.5 on cozaar 100 mg, dyazide 37.5/25 mg, norvasc 10 mg, klor 20 meq two tab bid  6/30/15 Na 132,K+ 3.5 on cozaar 100 mg, dyazide 37.5/25 mg, norvasc 10 mg, klor 20 meq two tab bid  8/31/16 K+ 3.3 on cozaar 100 mg, dyazide 37.5/25 mg, norvasc 10 mg, klor 20 meq two tab qday will increase to 2 bid  7/11/17 K+3.3 on cozaar 100 mg, dyazide 37.5/25 mg, norvasc 10 mg, klor 20 meq two bid  2/5/19 cardiology note paroxysmal atrial fibrillation, sinus on exam, no recurrence of atrial fibrillation status post maze, follow-up 1 year  12/6/19 K+3.5 on cozaar 100 mg, dyazide 37.5/25 mg, norvasc 10 mg, klor 20 meq two bid  6/16/20 cardiology note atrial fibrillation status post maze no recurrence, hypertension stable on current regimen, work on weight loss, follow-up 6 months  7/13/21  cardiology note on cozaar 100 mg, dyazide 37.5/25 mg, norvasc 10 mg, klor 20 meq two bid  8/24/21 K+ 3.4 on klor 20 meq two bid recommend increase to 2 am, 1 noon, 2 pm  6/22/22 K+ 3.4 on klor 20 meq 2 am, 1 noon, 2 pm on cozaar 100 mg, dyazide 37.5/25 mg, norvasc 10 mg   7/6/22 cardiology note no changes   6/19/23 K+ 3.8 on klor 20 meq 2 am, 1 noon, 2 pm on cozaar 100 mg, dyazide 37.5/25 mg, norvasc 10 mg   12/8/23 sbp running 135-145 recommend add coreg he declines  3/21/24 home blood pressures running 126/73 on klor 20 meq 2 am, 1 noon, 2 pm on cozaar 100 mg, dyazide 37.5/25 mg, norvasc 10 mg    6/25/24 bun 19,creat 0.86,Na 137,K+3.7 on cozaar 100 mg,norvasc 10 mg,dyazide 37.5/25 mg average blood pressure april to june 122/73  9/10/24 cardiology note   9/24/24 on cozaar 100 mg,norvasc 10 mg,dyazide 37.5/25 2 per day,kdur 20 meq 5 per day  12/11/24 bun 21,creat 1.1,GFR 69,K+3.2 on cozaar 100 mg,norvasc 10 mg,dyazide 37.5/25 2 per day,kdur 20 meq 5 per day  1/7/25 bun 26,creat 1.15,GFR 65,K+4.2 on cozaar 100 mg,norvasc 10 mg,dyazide 37.5/25 2 per day,kdur 20 meq 6 per day     History stroke  1999  affected right arm, no old records  9/24/24 on asa 81 mg  Some difficulty with short recall, and occasional diff with expressing self when fatigued     History squamous cell carcinoma  Saw  dermatology offered aldara he declined  3/27/17  dermatology note  5/1/17  dermatology biopsy proven squamous cell carcinoma in situ left forehead, here for mohs  9/19/17  dermatology note continue   12/19/23 declines dermatology follow up     Fibromyalgia  3/25/16 start lyrica 50 mg tid for fibromyalgia, continue percocet 10 mg 5 per day and cymbalta 60 mg  4/12/16 increase lyrica to 100 mg tid (50 mg two tid), continue cymbalta 60 mg and percocet 10 mg 5 per day  4/28/16 increase lyrica to 150 mg tid, continue cymbalta 60 mg and percocet 10 mg five times per day  5/10/16 on lyrica 50 mg three tabs tid, change to 100 mg am, 150 mg noon, 100 mg pm     Dyslipidemia  Followed by cardiology  2/09 cholesterol 135, triglycerides 100, HDL 47, LDL 68  8/09 chol 138,trig 101,hdl 49,ldl 69  3/10 chol 123,trig 66,hdl 49,ldl 61  7/10 [facility] note change vytorin to zocor 40  11/10 chol 141,trig 75,hdl 62,ldl 64 on vytorin 10/20 mg per [facility]  10/11 chol 159,trig 102,hdl 52,ldl 87 on vytorin 10/20 per [facility]  2/29/12 stop vytorin, change to zocor 20 mg per [facility]  6/12 chol 152,trig 102,hdl 49,ldl 83 on zocor 20 mg  6/13 chol 150,trig 130,hdl 52,ldl 72 on zocor 20 mg  1/29/14 chol 147,trig 127,hdl 50,ldl 72 on zocor 20 mg  9/3/14 chol 169,trig 228,hdl 49,ldl 74 on zocor 20 mg  6/30/15 chol 151,trig 88,hdl 63,ldl 70 on zocor 20 mg  8/31/16 chol 169,trig 130,hdl 59,ldl 84 on zocor 20 mg  7/11/17 chol 147,trig 146,hdl 51,ldl 67 on zocor 20 mg  11/8/18 chol 189,trig 215,hdl 54,ldl 92 on zocor 20 mg  12/6/19 chol 163,trig 214,hdl 54,ldl 66 on zocor 20 mg  8/24/21 chol 166,trig 142,hdl 57,ldl 81 on zocor 20 mg  6/22/22 chol 149,trig 139,hdl 48,ldl 73 on zocor 20 mg  6/19/23 chol 153,trig 160,hdl 51,ldl 70 on zocor  "20 mg  6/24/24 chol 162,trig 184,hdl 54,ldl 71 on zocor 20 mg  9/10/24 cardiology note follow-up as needed  12/11/24 chol 147,trig 146,hdl 51,ldl 67 on zocor 20 mg     Depression  3/12 tried cymalta for pain no benefit  12/6/13 PHQ 9 score 15, start cymbalta samples 30 mg x 7 days, then 60 mg  12/20/13 cymbalta 60 mg  1/22/15 off cymbalta    5/21/15 on cymbalta 60 mg  2/19/16 referral to behavioral health recommended he declines  11/29/16 depression screening score 0, continues on cymbalta  1/30/18 depression screening score 0 on cymbalta  8/2/21 on cymbalta screening 0 score   1/4/22 on cymbalta screening 0/3 score  3/16/24 on cymbalta     Chronic pain   6/24/24 UDT  10/31/24 oxycodone  11/30/24 oxycodone  12/17/24 contract  Has tried hydrocodone, NSAIDs, pregabalin, steroid injections, physical therapy, cymbalta     abn psa  6/19/23 psa 3.3  6/24/24 psa 4.1 declines rectal exam repeat labs ordered  12/11/24 psa 5.0  12/17/24 declines rectal exam, declines follow-up for abnormal psa understanding risk of prostate cancer thus I will not order further psa tests            Problem List[2]            ROS           Objective     Pulse 85   Temp 36.8 °C (98.3 °F) (Temporal)   Ht 1.71 m (5' 7.32\")   Wt 112 kg (247 lb 9.2 oz)   SpO2 93%   BMI 38.40 kg/m²      Physical Exam  Vitals and nursing note reviewed.   Constitutional:       Appearance: Normal appearance.   HENT:      Head: Normocephalic and atraumatic.      Right Ear: External ear normal.      Left Ear: External ear normal.      Nose: Nose normal.   Eyes:      Conjunctiva/sclera: Conjunctivae normal.   Cardiovascular:      Rate and Rhythm: Normal rate and regular rhythm.   Pulmonary:      Effort: Pulmonary effort is normal. No respiratory distress.      Breath sounds: Normal breath sounds.   Abdominal:      General: There is no distension.   Musculoskeletal:         General: No swelling.      Cervical back: Neck supple.   Skin:     Coloration: Skin is not " jaundiced.      Findings: No bruising.   Neurological:      General: No focal deficit present.      Mental Status: He is alert.   Psychiatric:         Mood and Affect: Mood normal.         Behavior: Behavior normal.        No edema         Assessment & Plan       Assessment  #1 chronic back pain status post lumbar surgery 2012, chronic hip pain knee pain status post bilateral hip replacement and left knee replacement, on chronic opiate therapy    #2 chronic opiate therapy Percocet 10 mg 4/day, has seen pain management previously, remains stable on the same dose of medication since he saw pain management last 4 years ago, has tried oxycodone, NSAIDs, Cymbalta, Lyrica, gabapentin, physical therapy, steroid injections, seen surgery, and pain management, pain is stable and controlled on Percocet, allowing him to perform his ADLs without side effects, last urine drug screen June of last year, last controlled substance contract December, chronic Percocet therapy does not cause drowsiness, sedation, memory loss, depression, constipation, no alcohol, no illicit drugs with the medication    #3 Hypertension, stable blood pressures on losartan 100 mg, amlodipine 10 mg, Dyazide 2/day, potassium 20 mill COVID 6/day, stable BUN 26, creatinine 1.1, GFR 65, potassium 4.2 in January, has declined to change from Dyazide and K-dur to Aldactone as offered previously    #4 dyslipidemia 12/11/24 chol 147,trig 146,hdl 51,ldl 67 on zocor 20 mg    #5 BMI 30.4 obesity weight 247 pounds, has lost 8 pounds since his last visit and 12 pounds since December working on good nutrition program    #6 paroxysmal atrial fibrillation seen by cardiology last December told to follow-up as needed, continues on asa, per cardiology no anticoagulation necessary status post Maze procedure 2005 with no recurrence last EKG September of last year sinus    #7 impaired glucose metabolism    #8 vitamin D deficiency    #9 chronic insomnia on Ambien    #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  reviewed and consistent    #2 refill Percocet 10 mg 4/day 120 per 30 days, refill to [facility] this has to be sent to a different pharmacy from the Ambien as patient prefers this so I have to do 2 separate refills to 2 different pharmacies, for the [facility] 3 separate prescriptions for the Percocet 30 days apart from his last refill for 3 months, and 1 other prescription to [facility]    #3 urine drug screen ordered    #4 labs ordered    #5 up-to-date on controlled substance contract    #6 I believe Percocet long-term use benefits outweigh risks, understanding risks long-term habituation, dependence, memory loss, depression, continue alcohol with the medication    #7 refill Ambien quantity 90 days after his last refill, this is sent to [facility] pharmacy separately, I believe medication is effective, patient unable to taper understanding long-term risk of habituation, dependence, memory loss, depression with long-term sedative-hypnotic use but since he cannot taper he will continue the medication, has declined sleep psychology and medication continues to help sleep without side effects    #8 continue to check blood pressure regularly, work on a good nutrition program since he is unable to exercise, try to continue to work on limiting sweets, candies, processed foods he has done an excellent job losing weight over the past 6 months which will help his chronic pain, hypertension, dyslipidemia    #9 follow-up 3 months          [1]   Current Outpatient Medications   Medication Sig Dispense Refill    zolpidem (AMBIEN) 10 MG Tab Take 1 Tablet by mouth at bedtime as needed for Sleep for up to 90 days. 90 Tablet 0    naproxen (NAPROSYN) 500 MG Tab TAKE 1 TABLET BY MOUTH TWICE A  DAY AS NEEDED FOR PAIN 200 Tablet 3    losartan (COZAAR) 100 MG Tab TAKE 1 TABLET BY MOUTH DAILY 100 Tablet 2    oxyCODONE-acetaminophen  (PERCOCET-10)  MG Tab Take 1 Tablet by mouth every 6 hours as needed for Severe Pain for up to 30 days. 120 Tablet 0    potassium chloride SA (KDUR) 20 MEQ Tab CR Take 2 Tablets by mouth 3 times a day. 600 Tablet 2    DULoxetine (CYMBALTA) 60 MG Cap DR Particles delayed-release capsule TAKE 1 CAPSULE BY MOUTH DAILY 90 Capsule 3    amLODIPine (NORVASC) 10 MG Tab Take 1 Tablet by mouth every day. 100 Tablet 2    simvastatin (ZOCOR) 20 MG Tab Take 1 Tablet by mouth every evening. 100 Tablet 2    triamterene/hctz (MAXZIDE-25/DYAZIDE) 37.5-25 MG Cap TAKE 2 CAPSULES BY MOUTH DAILY 200 Capsule 2    loratadine (CLARITIN) 10 MG Tab       Multiple Vitamins-Minerals (MULTI COMPLETE PO) Take  by mouth.      aspirin (ASA) 81 MG Chew Tab chewable tablet Take 1 Tab by mouth every day. 100 Tab 11    polyethylene glycol/lytes (MIRALAX) PACK Take 17 g by mouth every day.      docusate sodium (COLACE) 100 MG CAPS Take 100 mg by mouth every day.      vitamin D (CHOLECALCIFEROL) 1000 UNIT TABS Take 6,000 Units by mouth every day.       No current facility-administered medications for this visit.   [2]   Patient Active Problem List  Diagnosis    S/P hip replacement    S/p lumbar surgery    Neck pain    Hypertension    Paroxysmal atrial fibrillation ([facility])    Dyslipidemia    History of squamous cell carcinoma    Preventative health care    S/P knee replacement    Morbid (severe) obesity due to excess calories ([facility])    S/p heel left surgery    History of stroke    Insomnia    Depression, major, in full remission ([facility])    Chronic pain    Knee arthritis    Opiate dependence, continuous ([facility])    S/P Maze operation for atrial fibrillation    Fibromyalgia    Impaired glucose metabolism    Abnormal PSA

## 2025-06-18 VITALS
OXYGEN SATURATION: 93 % | RESPIRATION RATE: 16 BRPM | DIASTOLIC BLOOD PRESSURE: 76 MMHG | SYSTOLIC BLOOD PRESSURE: 126 MMHG | HEART RATE: 85 BPM | BODY MASS INDEX: 38.86 KG/M2 | TEMPERATURE: 98.3 F | WEIGHT: 247.58 LBS | HEIGHT: 67 IN

## 2025-06-18 RX ORDER — OXYCODONE AND ACETAMINOPHEN 10; 325 MG/1; MG/1
1 TABLET ORAL EVERY 6 HOURS PRN
Qty: 120 TABLET | Refills: 0 | Status: SHIPPED | OUTPATIENT
Start: 2025-06-28 | End: 2025-07-28

## 2025-06-18 RX ORDER — OXYCODONE AND ACETAMINOPHEN 10; 325 MG/1; MG/1
1 TABLET ORAL EVERY 6 HOURS PRN
Qty: 120 TABLET | Refills: 0 | Status: SHIPPED | OUTPATIENT
Start: 2025-05-29 | End: 2025-06-28

## 2025-06-18 RX ORDER — ZOLPIDEM TARTRATE 10 MG/1
10 TABLET ORAL
Qty: 90 TABLET | Refills: 0 | Status: SHIPPED | OUTPATIENT
Start: 2025-08-25 | End: 2025-11-23

## 2025-06-18 RX ORDER — OXYCODONE AND ACETAMINOPHEN 10; 325 MG/1; MG/1
1 TABLET ORAL EVERY 6 HOURS PRN
Qty: 120 TABLET | Refills: 0 | Status: SHIPPED | OUTPATIENT
Start: 2025-08-27 | End: 2025-09-26

## 2025-06-21 DIAGNOSIS — I10 ESSENTIAL HYPERTENSION: ICD-10-CM

## 2025-06-21 RX ORDER — TRIAMTERENE AND HYDROCHLOROTHIAZIDE 37.5; 25 MG/1; MG/1
2 CAPSULE ORAL DAILY
Qty: 200 CAPSULE | Refills: 2 | Status: SHIPPED | OUTPATIENT
Start: 2025-06-21

## 2025-07-28 ENCOUNTER — TELEPHONE (OUTPATIENT)
Dept: MEDICAL GROUP | Facility: MEDICAL CENTER | Age: 80
End: 2025-07-28
Payer: MEDICARE

## 2025-07-28 DIAGNOSIS — M54.9 BACK PAIN, UNSPECIFIED BACK LOCATION, UNSPECIFIED BACK PAIN LATERALITY, UNSPECIFIED CHRONICITY: ICD-10-CM

## 2025-07-28 RX ORDER — OXYCODONE AND ACETAMINOPHEN 10; 325 MG/1; MG/1
1 TABLET ORAL EVERY 6 HOURS PRN
Qty: 120 TABLET | Refills: 0 | Status: SHIPPED | OUTPATIENT
Start: 2025-07-28 | End: 2025-08-27

## 2025-07-28 NOTE — TELEPHONE ENCOUNTER
1. Name: Julio Gonzalez      Call Back Number: [phone] (home)         How would the patient prefer to be contacted with a response: Phone call OK to leave a detailed message    Pts wife called in and said she called and LVM this morning she sent my chart message this morning and now she is calling in person for the concern she sent on my chart she said she doesn't want to wait 24 to 48 hours as system tells them they have to wait. She said she wanted me to give hand note to Dr Peterson to get this done ASAP. Advised I would send another message and talk to MA.     Thank you